# Patient Record
Sex: FEMALE | Race: WHITE | NOT HISPANIC OR LATINO | Employment: OTHER | URBAN - METROPOLITAN AREA
[De-identification: names, ages, dates, MRNs, and addresses within clinical notes are randomized per-mention and may not be internally consistent; named-entity substitution may affect disease eponyms.]

---

## 2017-01-03 PROBLEM — N81.6 RECTOCELE: Status: ACTIVE | Noted: 2017-01-03

## 2017-02-14 ENCOUNTER — TELEPHONE (OUTPATIENT)
Dept: VASCULAR LAB | Facility: MEDICAL CENTER | Age: 62
End: 2017-02-14

## 2017-06-24 NOTE — H&P
DATE OF SCHEDULED SURGERY:  07/18/2017    REASON FOR SURGERY:  Recurrent symptomatic pelvic floor relaxation, type 2   stress urinary incontinence demonstrated.  Endemic status post mixed urinary   incontinence symptoms and pelvic pain.    HISTORY OF PRESENT ILLNESS:  This is a 62-year-old postmenopausal woman, who   has undergone a previous laparoscopic-assisted vaginal hysterectomy, bilateral   salpingo-oophorectomy, anterior and posterior vaginal repair, enterocele   repair, sacrospinous vault suspension with perineoplasty, transobturator tape   midurethral sling procedure, cystoscopy, now with recurrent pelvic floor   relaxation.  Postoperatively, the patient does state that she has significant   constipation and noticed a popping sensation postoperatively with a subsequent   recurrent bulge at the vaginal introitus.  She has attempted to proceed   forward with conservative management with pelvic floor exercises.  She has   exhausted all conservative measures, declines pessary therapy or any other   conservative management and is now interested in proceeding forward with an   attempt at addressing her recurrent pelvic floor relaxation with an anterior   and posterior vaginal repair, enterocele repair, perineoplasty, sacrospinous   vault suspension, transobturator tape midurethral sling procedure.  Risks,   benefits, alternatives of all individual portions of the procedure were   addressed with the patient in detail over several visits.  She has asked   appropriate questions, signed the appropriate consents and wishes to proceed   forward with surgery as planned.    PAST MEDICAL HISTORY:  Arthritis, deep vein thrombosis, lumbago, mixed urinary   incontinence, bulge at the vaginal introitus, pelvic pain.    PAST SURGICAL HISTORY:  Cholecystectomy at age 20, hemorrhoidectomy surgery as   described above.    OBSTETRICAL HISTORY:  The patient has had 1 previous delivery, 9 pounds 11   ounces in 1973.    GYNECOLOGIC  HISTORY:  The patient has been amenorrheic for many years even   prior to her last surgery.  She does report a recurrent bulge at the vaginal   introitus as described above associated with significant pelvic pain.  She   also has mixed urinary incontinence symptoms.  She does understand her pelvic   pain and urinary incontinence, may be unimproved gradually worsened with the   surgery as described above who is interested in proceeding forward with   surgery nonetheless.  We had discussed this prior to her last surgery and   again during this preoperative counseling.    SOCIAL HISTORY:  She is .  She denies use of any alcohol, tobacco, or   recreational drug use.    MEDICATIONS:  Abilify 2 mg p.o. daily, alprazolam 1 mg p.o. daily, duloxetine   20 mg 3 times daily, famotidine 20 mg p.o. q. 6 hours p.r.n. pain, tramadol 50   mg p.o. q. 6 hours p.r.n., Xarelto 20 mg p.o. daily.    ALLERGIES:  No known drug allergies.    PHYSICAL EXAMINATION:  VITAL SIGNS:  She is afebrile, hemodynamically stable.  HEART:  Regular rate and rhythm.  CHEST:  Clear to auscultation bilaterally.  ABDOMEN:  Morbidly obese, nontender.  PELVIC:  Shows grade II-III anterior vaginal relaxation, grade II to III.    apical and grade II posterior vaginal relaxation.  Rectovaginal exam confirmed   the above.  She is guaiac negative.  There is evidence of an enterocele on   rectovaginal examination.  EXTREMITIES:  Urodynamic studies did confirm type 2 stress urinary   incontinence with mixed urinary incontinence symptoms.    ASSESSMENT AND PLAN:  A 62-year-old postmenopausal woman with recurrent pelvic   floor relaxation.  Of note, the patient had been originally referred from Dr. Mckeon for evaluation and management of her initial pelvic floor relaxation   and she had undergone conservative management prior to that surgery.  She has   attempted again conservative management with pelvic floor exercises and is now   interested in proceeding  forward with surgery as described above as she has   failed her outpatient conservative management and is now interested in   proceeding forward with any other conservative measures.  Risks, benefits and   alternatives have been addressed.  She has asked appropriate questions, signed   the appropriate consents, and wished to proceed forward with surgery as   planned.       ____________________________________     MD ROSA SLATER / LATHA    DD:  06/23/2017 08:21:23  DT:  06/23/2017 10:24:29    D#:  [id number]  Job#:  [id number]

## 2017-07-10 DIAGNOSIS — Z01.810 PRE-OPERATIVE CARDIOVASCULAR EXAMINATION: ICD-10-CM

## 2017-07-10 DIAGNOSIS — Z01.812 PRE-OPERATIVE LABORATORY EXAMINATION: ICD-10-CM

## 2017-07-10 LAB
ANION GAP SERPL CALC-SCNC: 8 MMOL/L (ref 0–11.9)
BUN SERPL-MCNC: 17 MG/DL (ref 8–22)
CALCIUM SERPL-MCNC: 8.9 MG/DL (ref 8.5–10.5)
CHLORIDE SERPL-SCNC: 106 MMOL/L (ref 96–112)
CO2 SERPL-SCNC: 27 MMOL/L (ref 20–33)
CREAT SERPL-MCNC: 0.76 MG/DL (ref 0.5–1.4)
EKG IMPRESSION: NORMAL
ERYTHROCYTE [DISTWIDTH] IN BLOOD BY AUTOMATED COUNT: 56.6 FL (ref 35.9–50)
GFR SERPL CREATININE-BSD FRML MDRD: >60 ML/MIN/1.73 M 2
GLUCOSE SERPL-MCNC: 124 MG/DL (ref 65–99)
HCT VFR BLD AUTO: 42.1 % (ref 37–47)
HGB BLD-MCNC: 13.3 G/DL (ref 12–16)
MCH RBC QN AUTO: 29.8 PG (ref 27–33)
MCHC RBC AUTO-ENTMCNC: 31.6 G/DL (ref 33.6–35)
MCV RBC AUTO: 94.4 FL (ref 81.4–97.8)
PLATELET # BLD AUTO: 238 K/UL (ref 164–446)
PMV BLD AUTO: 10.6 FL (ref 9–12.9)
POTASSIUM SERPL-SCNC: 3.9 MMOL/L (ref 3.6–5.5)
RBC # BLD AUTO: 4.46 M/UL (ref 4.2–5.4)
SODIUM SERPL-SCNC: 141 MMOL/L (ref 135–145)
WBC # BLD AUTO: 7.4 K/UL (ref 4.8–10.8)

## 2017-07-10 PROCEDURE — 85027 COMPLETE CBC AUTOMATED: CPT

## 2017-07-10 PROCEDURE — 93010 ELECTROCARDIOGRAM REPORT: CPT | Performed by: INTERNAL MEDICINE

## 2017-07-10 PROCEDURE — 36415 COLL VENOUS BLD VENIPUNCTURE: CPT

## 2017-07-10 PROCEDURE — 93005 ELECTROCARDIOGRAM TRACING: CPT

## 2017-07-10 PROCEDURE — 80048 BASIC METABOLIC PNL TOTAL CA: CPT

## 2017-07-10 RX ORDER — ARIPIPRAZOLE 2 MG/1
2 TABLET ORAL DAILY
COMMUNITY

## 2017-07-18 ENCOUNTER — HOSPITAL ENCOUNTER (OUTPATIENT)
Facility: MEDICAL CENTER | Age: 62
End: 2017-07-18
Attending: SPECIALIST | Admitting: SPECIALIST
Payer: MEDICAID

## 2017-07-18 VITALS
SYSTOLIC BLOOD PRESSURE: 148 MMHG | DIASTOLIC BLOOD PRESSURE: 86 MMHG | HEIGHT: 62 IN | HEART RATE: 76 BPM | BODY MASS INDEX: 53.92 KG/M2 | RESPIRATION RATE: 16 BRPM | TEMPERATURE: 97.7 F | OXYGEN SATURATION: 98 % | WEIGHT: 293 LBS

## 2017-07-18 PROBLEM — N39.3 FEMALE STRESS INCONTINENCE: Status: ACTIVE | Noted: 2017-07-18

## 2017-07-18 PROBLEM — N81.11 CYSTOCELE, MIDLINE: Status: ACTIVE | Noted: 2017-07-18

## 2017-07-18 PROBLEM — R10.2 ADNEXAL TENDERNESS, RIGHT: Status: ACTIVE | Noted: 2017-07-18

## 2017-07-18 PROCEDURE — A4338 INDWELLING CATHETER LATEX: HCPCS | Performed by: SPECIALIST

## 2017-07-18 PROCEDURE — 500892 HCHG PACK, PERI-GYN: Performed by: SPECIALIST

## 2017-07-18 PROCEDURE — 501838 HCHG SUTURE GENERAL: Performed by: SPECIALIST

## 2017-07-18 PROCEDURE — 502240 HCHG MISC OR SUPPLY RC 0272: Performed by: SPECIALIST

## 2017-07-18 PROCEDURE — A9270 NON-COVERED ITEM OR SERVICE: HCPCS

## 2017-07-18 PROCEDURE — 160035 HCHG PACU - 1ST 60 MINS PHASE I: Performed by: SPECIALIST

## 2017-07-18 PROCEDURE — 700111 HCHG RX REV CODE 636 W/ 250 OVERRIDE (IP)

## 2017-07-18 PROCEDURE — 160041 HCHG SURGERY MINUTES - EA ADDL 1 MIN LEVEL 4: Performed by: SPECIALIST

## 2017-07-18 PROCEDURE — 160048 HCHG OR STATISTICAL LEVEL 1-5: Performed by: SPECIALIST

## 2017-07-18 PROCEDURE — C1771 REP DEV, URINARY, W/SLING: HCPCS | Performed by: SPECIALIST

## 2017-07-18 PROCEDURE — 160029 HCHG SURGERY MINUTES - 1ST 30 MINS LEVEL 4: Performed by: SPECIALIST

## 2017-07-18 PROCEDURE — 700101 HCHG RX REV CODE 250

## 2017-07-18 PROCEDURE — 160009 HCHG ANES TIME/MIN: Performed by: SPECIALIST

## 2017-07-18 PROCEDURE — 501516 HCHG SUTURE, CAPIO: Performed by: SPECIALIST

## 2017-07-18 PROCEDURE — 700102 HCHG RX REV CODE 250 W/ 637 OVERRIDE(OP)

## 2017-07-18 PROCEDURE — 160002 HCHG RECOVERY MINUTES (STAT): Performed by: SPECIALIST

## 2017-07-18 PROCEDURE — 160036 HCHG PACU - EA ADDL 30 MINS PHASE I: Performed by: SPECIALIST

## 2017-07-18 PROCEDURE — 501330 HCHG SET, CYSTO IRRIG TUBING: Performed by: SPECIALIST

## 2017-07-18 DEVICE — SLING TOT OBTRYX I HALO: Type: IMPLANTABLE DEVICE | Site: VAGINA | Status: FUNCTIONAL

## 2017-07-18 RX ORDER — CELECOXIB 200 MG/1
CAPSULE ORAL
Status: COMPLETED
Start: 2017-07-18 | End: 2017-07-18

## 2017-07-18 RX ORDER — LIDOCAINE HYDROCHLORIDE 10 MG/ML
0.5 INJECTION, SOLUTION INFILTRATION; PERINEURAL
Status: DISCONTINUED | OUTPATIENT
Start: 2017-07-18 | End: 2017-07-18 | Stop reason: HOSPADM

## 2017-07-18 RX ORDER — ONDANSETRON 2 MG/ML
4 INJECTION INTRAMUSCULAR; INTRAVENOUS EVERY 6 HOURS PRN
Status: DISCONTINUED | OUTPATIENT
Start: 2017-07-18 | End: 2017-07-18 | Stop reason: HOSPADM

## 2017-07-18 RX ORDER — SIMETHICONE 80 MG
80 TABLET,CHEWABLE ORAL EVERY 8 HOURS PRN
Status: DISCONTINUED | OUTPATIENT
Start: 2017-07-18 | End: 2017-07-18 | Stop reason: HOSPADM

## 2017-07-18 RX ORDER — GABAPENTIN 300 MG/1
CAPSULE ORAL
Status: COMPLETED
Start: 2017-07-18 | End: 2017-07-18

## 2017-07-18 RX ORDER — OXYCODONE HYDROCHLORIDE 5 MG/1
10 TABLET ORAL
Status: DISCONTINUED | OUTPATIENT
Start: 2017-07-18 | End: 2017-07-18 | Stop reason: HOSPADM

## 2017-07-18 RX ORDER — ACETAMINOPHEN 500 MG
1000 TABLET ORAL EVERY 6 HOURS
Status: DISCONTINUED | OUTPATIENT
Start: 2017-07-18 | End: 2017-07-18 | Stop reason: HOSPADM

## 2017-07-18 RX ORDER — SODIUM CHLORIDE, SODIUM LACTATE, POTASSIUM CHLORIDE, CALCIUM CHLORIDE 600; 310; 30; 20 MG/100ML; MG/100ML; MG/100ML; MG/100ML
INJECTION, SOLUTION INTRAVENOUS CONTINUOUS
Status: DISCONTINUED | OUTPATIENT
Start: 2017-07-18 | End: 2017-07-18 | Stop reason: HOSPADM

## 2017-07-18 RX ORDER — METOCLOPRAMIDE HYDROCHLORIDE 5 MG/ML
10 INJECTION INTRAMUSCULAR; INTRAVENOUS EVERY 4 HOURS PRN
Status: DISCONTINUED | OUTPATIENT
Start: 2017-07-18 | End: 2017-07-18 | Stop reason: HOSPADM

## 2017-07-18 RX ORDER — BUPIVACAINE HYDROCHLORIDE AND EPINEPHRINE 2.5; 5 MG/ML; UG/ML
INJECTION, SOLUTION EPIDURAL; INFILTRATION; INTRACAUDAL; PERINEURAL
Status: DISCONTINUED
Start: 2017-07-18 | End: 2017-07-18 | Stop reason: HOSPADM

## 2017-07-18 RX ORDER — ACETAMINOPHEN 500 MG
TABLET ORAL
Status: COMPLETED
Start: 2017-07-18 | End: 2017-07-18

## 2017-07-18 RX ORDER — ONDANSETRON 2 MG/ML
INJECTION INTRAMUSCULAR; INTRAVENOUS
Status: COMPLETED
Start: 2017-07-18 | End: 2017-07-18

## 2017-07-18 RX ORDER — MIDAZOLAM HYDROCHLORIDE 1 MG/ML
INJECTION INTRAMUSCULAR; INTRAVENOUS
Status: DISCONTINUED
Start: 2017-07-18 | End: 2017-07-18 | Stop reason: HOSPADM

## 2017-07-18 RX ORDER — BUPIVACAINE HYDROCHLORIDE AND EPINEPHRINE 2.5; 5 MG/ML; UG/ML
INJECTION, SOLUTION INFILTRATION; PERINEURAL
Status: DISCONTINUED | OUTPATIENT
Start: 2017-07-18 | End: 2017-07-18 | Stop reason: HOSPADM

## 2017-07-18 RX ORDER — HALOPERIDOL 5 MG/ML
INJECTION INTRAMUSCULAR
Status: COMPLETED
Start: 2017-07-18 | End: 2017-07-18

## 2017-07-18 RX ORDER — LIDOCAINE AND PRILOCAINE 25; 25 MG/G; MG/G
1 CREAM TOPICAL
Status: DISCONTINUED | OUTPATIENT
Start: 2017-07-18 | End: 2017-07-18 | Stop reason: HOSPADM

## 2017-07-18 RX ORDER — OXYCODONE HCL 5 MG/5 ML
SOLUTION, ORAL ORAL
Status: COMPLETED
Start: 2017-07-18 | End: 2017-07-18

## 2017-07-18 RX ADMIN — ACETAMINOPHEN 1000 MG: 500 TABLET, FILM COATED ORAL at 15:45

## 2017-07-18 RX ADMIN — ONDANSETRON 4 MG: 2 INJECTION INTRAMUSCULAR; INTRAVENOUS at 17:57

## 2017-07-18 RX ADMIN — CELECOXIB 400 MG: 200 CAPSULE ORAL at 15:45

## 2017-07-18 RX ADMIN — SODIUM CHLORIDE, SODIUM LACTATE, POTASSIUM CHLORIDE, CALCIUM CHLORIDE: 600; 310; 30; 20 INJECTION, SOLUTION INTRAVENOUS at 13:30

## 2017-07-18 RX ADMIN — FENTANYL CITRATE 50 MCG: 50 INJECTION, SOLUTION INTRAMUSCULAR; INTRAVENOUS at 17:30

## 2017-07-18 RX ADMIN — HYDROMORPHONE HYDROCHLORIDE 0.2 MG: 1 INJECTION, SOLUTION INTRAMUSCULAR; INTRAVENOUS; SUBCUTANEOUS at 17:40

## 2017-07-18 RX ADMIN — HALOPERIDOL LACTATE 1 MG: 5 INJECTION, SOLUTION INTRAMUSCULAR at 18:09

## 2017-07-18 RX ADMIN — GABAPENTIN 600 MG: 300 CAPSULE ORAL at 15:45

## 2017-07-18 RX ADMIN — FENTANYL CITRATE 50 MCG: 50 INJECTION, SOLUTION INTRAMUSCULAR; INTRAVENOUS at 17:25

## 2017-07-18 RX ADMIN — HYDROMORPHONE HYDROCHLORIDE 0.2 MG: 1 INJECTION, SOLUTION INTRAMUSCULAR; INTRAVENOUS; SUBCUTANEOUS at 17:35

## 2017-07-18 RX ADMIN — OXYCODONE HYDROCHLORIDE 10 MG: 5 SOLUTION ORAL at 17:26

## 2017-07-18 ASSESSMENT — PAIN SCALES - GENERAL
PAINLEVEL_OUTOF10: 9
PAINLEVEL_OUTOF10: 3
PAINLEVEL_OUTOF10: 7
PAINLEVEL_OUTOF10: 5
PAINLEVEL_OUTOF10: 3
PAINLEVEL_OUTOF10: 0
PAINLEVEL_OUTOF10: 5

## 2017-07-18 NOTE — IP AVS SNAPSHOT
7/18/2017    Nieves Murphy  [address] [place] [address] [address]   [place] [address]    Dear Nieves:    [facility] wants to ensure your discharge home is safe and you or your loved ones have had all of your questions answered regarding your care after you leave the hospital.    Below is a list of resources and contact information should you have any questions regarding your hospital stay, follow-up instructions, or active medical symptoms.    Questions or Concerns Regarding… Contact   Medical Questions Related to Your Discharge  (7 days a week, 8am-5pm) Contact a Nurse Care Coordinator   [phone]   Medical Questions Not Related to Your Discharge  (24 hours a day / 7 days a week)  Contact the Nurse Health Line   [phone]    Medications or Discharge Instructions Refer to your discharge packet   or contact your [facility] Primary Care Provider   [phone]   Follow-up Appointment(s) Schedule your appointment via [employer]   or contact Scheduling [phone]   Billing Review your statement via [employer]  or contact Billing [phone]   Medical Records Review your records via [employer]   or contact Medical Records [phone]     You may receive a telephone call within two days of discharge. This call is to make certain you understand your discharge instructions and have the opportunity to have any questions answered. You can also easily access your medical information, test results and upcoming appointments via the [employer] free online health management tool. You can learn more and sign up at [employer]. For assistance setting up your [employer] account, please call [phone].    Once again, we want to ensure your discharge home is safe and that you have a clear understanding of any next steps in your care. If you have any questions or concerns, please do not hesitate to contact us, we are here for you. Thank you for choosing [facility] for your healthcare needs.    Sincerely,    Your [facility] Healthcare Team

## 2017-07-18 NOTE — IP AVS SNAPSHOT
" Home Care Instructions                                                                                                                Name:Nieves Murphy  Medical Record Number:[id number]  CSN: [id number]    YOB: 1955   Age: 62 y.o.  Sex: female  HT:1.575 m (5' 2.01\") WT: 149.2 kg (328 lb 14.8 oz)          Admit Date: 7/18/2017     Discharge Date:   Today's Date: 7/18/2017  Attending Doctor:  Dave Ellis M.D.                  Allergies:  Asa and Pcn                Discharge Instructions         ACTIVITY: Rest and take it easy for the first 24 hours.  A responsible adult is recommended to remain with you during that time.  It is normal to feel sleepy.  We encourage you to not do anything that requires balance, judgment or coordination.    MILD FLU-LIKE SYMPTOMS ARE NORMAL. YOU MAY EXPERIENCE GENERALIZED MUSCLE ACHES, THROAT IRRITATION, HEADACHE AND/OR SOME NAUSEA.    FOR 24 HOURS DO NOT:  Drive, operate machinery or run household appliances.  Drink beer or alcoholic beverages.   Make important decisions or sign legal documents.    SPECIAL INSTRUCTIONS: *Anterior and Posterior Colporrhaphy, Sling Procedure, Care After  Refer to this sheet in the next few weeks. These instructions provide you with information on caring for yourself after your procedure. Your health care provider may also give you more specific instructions. Your treatment has been planned according to current medical practices, but problems sometimes occur. Call your health care provider if you have any problems or questions after your procedure.   HOME CARE INSTRUCTIONS  · Rest as much as possible during the first 2 weeks after the procedure.    · Avoid heavy lifting (more than 10 pounds [4.5 kg]), pushing, or pulling. Limit stair climbing to once or twice a day the first week, then slowly increase this activity.    · Avoid standing for prolonged periods of time.    · Talk with your health care provider about when you may resume " your usual physical activity.    · You may resume your normal diet right away.    · Drink at least 6-8 glasses of non-caffeinated beverages per day.    · Eat a well-balanced diet. Daily portions of food from the meat (protein), milk, fruit, vegetable, and bread families are necessary for your health.    · Your normal bowel function should return. If you become constipated, you may:    ¨ Take a mild laxative.  ¨ Add fruit and bran to your diet.  ¨ Drink more liquids.  · You may take a shower and wash your hair.    · Only take over-the-counter or prescription medicines as directed by your health care provider.    · Clean the incision with water. Do not use a dressing unless the incision is draining or irritated. Check your incision daily for redness, draining, swelling, or separation of the skin.    · Follow any bladder care instructions provided by your health care provider.    · Keep your perineal area (the area between vagina and rectum) clean and dry. Perform perineal care after every bowel movement and each time you urinate. You may take a sitz bath or sit in a tub of clean, warm water when necessary, unless your health care provider tells you otherwise.    · Do not have sexual intercourse until permitted by your health care provider.    · Follow up with your health care provider as directed.    SEEK MEDICAL CARE IF:  · You have shaking chills.    · Your pain is not relieved with medicine or becomes worse.   · You have frequent or urgent urination, or you are unable to completely empty your bladder.    · You feel a burning sensation when urinating.    · You see pus coming from the wounds.    SEEK IMMEDIATE MEDICAL CARE IF:  · You develop a fever.  · You notice redness, drainage, swelling, or separation of the skin at the incision site.  · You have difficulty breathing.  · You are unable to urinate.  MAKE SURE YOU:   · Understand these instructions.  · Will watch your condition.  · Will get help right away if you  are not doing well or get worse.     This information is not intended to replace advice given to you by your health care provider. Make sure you discuss any questions you have with your health care provider.     Document Released: 08/01/2005 Document Revised: 08/20/2014 Document Reviewed: 05/09/2014  [employer] Interactive Patient Education ©2016 [employer]  **.    If your physician has prescribed pain medication that includes Acetaminophen (Tylenol), do not take additional Acetaminophen (Tylenol) while taking the prescribed medication.    **    DIET: To avoid nausea, slowly advance diet as tolerated, avoiding spicy or greasy foods for the first day.  Add more substantial food to your diet according to your physician's instructions.  Babies can be fed formula or breast milk as soon as they are hungry.  INCREASE FLUIDS AND FIBER TO AVOID CONSTIPATION.    SURGICAL DRESSING/BATHING: *MAY SHOWER TOMORROW.  NO TUB BATHS, HOT TUBS OR SWIMMING UNTIL APPROVED BY PHYSICIAN**    FOLLOW-UP APPOINTMENT:  A follow-up appointment should be arranged with your doctor in *FOLLOW UP WITH DR. SHORT**; call to schedule.    You should CALL YOUR PHYSICIAN if you develop:  Fever greater than 101 degrees F.  Pain not relieved by medication, or persistent nausea or vomiting.  Excessive bleeding (blood soaking through dressing) or unexpected drainage from the wound.  Extreme redness or swelling around the incision site, drainage of pus or foul smelling drainage.  Inability to urinate or empty your bladder within 8 hours.  Problems with breathing or chest pain.    You should call 911 if you develop problems with breathing or chest pain.  If you are unable to contact your doctor or surgical center, you should go to the nearest emergency room or urgent care center.  Physician's telephone #: *DR. SHORT [phone]**    If any questions arise, call your doctor.  If your doctor is not available, please feel free to call the Surgical Center at  [phone].  The Center is open Monday through Friday from 7AM to 7PM.  You can also call the HEALTH HOTLINE open 24 hours/day, 7 days/week and speak to a nurse at [phone], or toll free at [phone].    A registered nurse may call you a few days after your surgery to see how you are doing after your procedure.    MEDICATIONS: Resume taking daily medication.  Take prescribed pain medication with food.  If no medication is prescribed, you may take non-aspirin pain medication if needed.  PAIN MEDICATION CAN BE VERY CONSTIPATING.  Take a stool softener or laxative such as senokot, pericolace, or milk of magnesia if needed.    Prescription given for **PATIENT HAS AT HOME*.  Last pain medication given at *____5:26 PM_____________________    Depression / Suicide Risk    As you are discharged from this [facility] facility, it is important to learn how to keep safe from harming yourself.    Recognize the warning signs:  · Abrupt changes in personality, positive or negative- including increase in energy   · Giving away possessions  · Change in eating patterns- significant weight changes-  positive or negative  · Change in sleeping patterns- unable to sleep or sleeping all the time   · Unwillingness or inability to communicate  · Depression  · Unusual sadness, discouragement and loneliness  · Talk of wanting to die  · Neglect of personal appearance   · Rebelliousness- reckless behavior  · Withdrawal from people/activities they love  · Confusion- inability to concentrate     If you or a loved one observes any of these behaviors or has concerns about self-harm, here's what you can do:  · Talk about it- your feelings and reasons for harming yourself  · Remove any means that you might use to hurt yourself (examples: pills, rope, extension cords, firearm)  · Get professional help from the community (Mental Health, Substance Abuse, psychological counseling)  · Do not be alone:Call your Safe Contact- someone whom  you trust who will be there for you.  · Call your local CRISIS HOTLINE 051-0498 or 920-017-4774  · Call your local Children's Mobile Crisis Response Team [place] (824) 271-9335 or www.[employer]  · Call the toll free National Suicide Prevention Hotlines   · National Suicide Prevention Lifeline 300-709-LLID (7766)  [facility] Line Network 800-SUICIDE (614-2069)Discharge Education for patients on RHONDA (Obstructive Sleep Apnea) Protocol    Prior to receiving sedation or anesthesia, we screen all patients for Obstructive Sleep Apnea.  During your screening, you were identified as having suspected, but not confirmed Obstructive Sleep Apnea(RHONDA).    What is Obstructive Sleep Apnea?  Sleep apnea (AP-ne-ah) is a common disorder which involves breathing pauses that occur during sleep.  These can last from 10 seconds to a minute or longer.  Normal breathing resumes often with a loud snort or choking sound.    Sleep apnea occurs in all age groups and both genders but is more common in men and people over 40 years of age.  It has been estimated that as many as 18 million Americans have sleep apnea.  Most people who have sleep apnea don’t know they have it because it only occurs during sleep.  A family member and/or bed partner may first notice the signs of sleep apnea.  Sleep apnea is a chronic (ongoing) condition that disrupts the quality and quantity of your sleep repeatedly throughout the night.  This often results in excessive daytime sleepiness or fatigue during the day.  It may also contribute to high blood pressure, heart problems, and complications following medications used for surgery and procedures.    To establish a definitive diagnosis, further testing from a specialist would be needed.  We recommend that you follow up with your primary care physician.    We recommend that you should be with an adult observer for at least 24 hours after your sedation/anesthesia.  If you have a CPAP machine, you should  wear it during any sleep period (day or night) for the week following your procedure.  We encourage you to sleep on your side or in a sitting position, even with napping.  Lying flat on your back increases the risk of apnea and airway obstruction during your post procedure recovery period.    It is important to prevent over-sedation that could increase your risk for apnea.  Please take all pain medication as directed by your physician.  If you are not getting pain relief, please contact your physician to discuss possible approaches to relieving pain while minimizing medications that can affect your breathing and oxygen levels.      ·        Medication List      CONTINUE taking these medications        Instructions    Morning Afternoon Evening Bedtime    alprazolam 0.25 MG Tabs   Commonly known as:  XANAX        Take 0.25 mg by mouth 3 times a day as needed for Sleep or Anxiety.   Dose:  0.25 mg                        aripiprazole 2 MG tablet   Commonly known as:  ABILIFY        Take 2 mg by mouth every day.   Dose:  2 mg                        duloxetine 20 MG Cpep   Commonly known as:  CYMBALTA        Take 20 mg by mouth 2 times a day.   Dose:  20 mg                        gabapentin 100 MG Caps   Last time this was given:  600 mg on 7/18/2017  3:45 PM   Commonly known as:  NEURONTIN        Take 200 mg by mouth every bedtime.   Dose:  200 mg                        promethazine 25 MG Tabs   Commonly known as:  PHENERGAN        Take 25 mg by mouth every 6 hours as needed for Nausea/Vomiting.   Dose:  25 mg                        rivaroxaban 20 MG Tabs tablet   Commonly known as:  XARELTO        Take 1 Tab by mouth with dinner.   Dose:  20 mg                        tramadol 50 MG Tabs   Commonly known as:  ULTRAM        Take  mg by mouth every four hours as needed for Mild Pain.   Dose:   mg                                Medication Information     Above and/or attached are the medications your physician  expects you to take upon discharge. Review all of your home medications and newly ordered medications with your doctor and/or pharmacist. Follow medication instructions as directed by your doctor and/or pharmacist. Please keep your medication list with you and share with your physician. Update the information when medications are discontinued, doses are changed, or new medications (including over-the-counter products) are added; and carry medication information at all times in the event of emergency situations.        Resources     Quit Smoking / Tobacco Use:    I understand the use of any tobacco products increases my chance of suffering from future heart disease or stroke and could cause other illnesses which may shorten my life. Quitting the use of tobacco products is the single most important thing I can do to improve my health. For further information on smoking / tobacco cessation call a Toll Free Quit Line at 1-354.288.1062 (*[employer] [place]) or 1-722.527.1710 (American Lung Association) or you can access the web based program at www.lungVisualnet.org.    [place] Tobacco Users Help Line:  (414) 330-7456       Toll Free: 1-900.155.3393  Quit Tobacco Program [facility] (318)158-0701    Crisis Hotline:    [place] Crisis Hotline:  2-380-GWMLFWY or 1-852.430.9977    [place] Crisis Hotline:    1-488.375.9074 or 615-327-2561    Discharge Survey:   Thank you for choosing [facility]. We hope we did everything we could to make your hospital stay a pleasant one. You may be receiving a survey and we would appreciate your time and participation in answering the questions. Your input is very valuable to us in our efforts to improve our service to our patients and their families.            Signatures     My signature on this form indicates that:    1. I acknowledge receipt and understanding of these Home Care Instruction.  2. My questions regarding this information have been answered to my  satisfaction.  3. I have formulated a plan with my discharge nurse to obtain my prescribed medications for home.    __________________________________      __________________________________                   Patient Signature                                 Guardian/Responsible Adult Signature      __________________________________                 __________       ________                       Nurse Signature                                               Date                 Time

## 2017-07-19 NOTE — OP REPORT
DATE OF SERVICE:  7/18/2017     PREOPERATIVE DIAGNOSES:  Recurrent symptomatic pelvic floor relaxation, type 2    stress urinary incontinence demonstrated     POSTOPERATIVE DIAGNOSES: same     PROCEDURES PERFORMED:  Anterior and posterior vaginal repair, enterocele    repair, sacrospinous vault suspension, transobturator tape midurethral sling    procedure, cystoscopy.     SURGEON:  Dave Ellis MD     ASSISTANT:  Aldo Taylor MD     ANESTHESIA:  General     ANESTHESIOLOGIST:  Anesthesiologist: Brock Briggs M.D.     ESTIMATED BLOOD LOSS FOR THE PROCEDURE:  less than 20 mL.     FINDINGS:  Good support of the anterior and posterior vaginal walls in    addition to the apical vaginal tissue without any undue tension in the suture    sites.  Cystoscopy revealed bilateral ureteral efflux and normal bladder and    no undue tension noted at the level of the mid urethra after sling placement    on cystoscopic evaluation.     DESCRIPTION OF PROCEDURE:  The patient was taken to the operating room where    general anesthesia was performed without difficulty.  Patient was then prepped   and draped in usual sterile fashion.  Lower extremities placed in Bipin    stirrups.  Attention was first turned to the perineum where a weighted    speculum was placed with the use of Sales retractor.  Anterior vaginal mucosa    was then injected with 10 mL of 0.25% Marcaine with epinephrine.  The vaginal    mucosa was then dissected off the underlying pubocervical fascia leroy until    the levator muscles were then reached.  Her previous transobturator tape sling   was resected followed by placement of horizontal mattress sutures    reapproximating the pubocervical fascia in midline in a double layer closure,    thereby reducing the midline cystocele followed by injection of 10 mL of 0.25%   Marcaine with epinephrine lateral to the clitoris in the labial crural folds    followed by stab incision made with the use of 11 blade scalpel in this     location on each side followed by placement of the respective Obtryx halo    needle through the labial crural incision sites to the obturator membranes    through the endopelvic fascia out through the vaginal mucosal incision at the    level of the mid urethra.  The sling was then applied to the Obtryx halo    needle.  Needle was then taken back up through the original tract.  Tensioning   of position was then performed.  Donis catheter was removed, which had been    placed at the beginning of the case for placement of a 30-degree cystoscope,    which revealed normal urinary bladder, bilateral ureteral efflux and no undue    tension noted at the level of the mid urethra.  The sling was then released,    tensioning of position was then finalized under direct cystoscopic evaluation.    Cystoscope removed.  Donis catheter replaced.  All excess vaginal mucosa and   sling material were then excised.  The vaginal mucosa was then reapproximated   with 2-0 Vicryl in a running interlocking fashion in one segment.  Posterior    vaginal mucosa was then injected with 10 mL of 0.25% Marcaine with    epinephrine.  The vaginal mucosa was then dissected off the underlying    rectovaginal fascia leroy until the levator muscles were then reached.     Horizontal mattress sutures were then used to reapproximate the rectovaginal    fascia in the midline in a double 0 closure, thereby reducing the midline    rectocele.  A large enterocele was located at the superior aspect of the    dissection.  Dissection of the sacrospinous process was then performed in the    ischial spine on each side, 3 cm medial along the sacrospinous ligament with straight    catheterization needle device, 0 Ethibond suture was taken through the    sacrospinous ligament taken out through their respective apical portions of the    vaginal cuff and the overlying vaginal mucosa.  Once tied down, there was good   reapproximation of the apex of the vaginal vault to  the sacrospinous    ligament.  The rectovaginal septum was then reapproximated in the posterior    aspect of the vaginal cuff in a double pursestring suture, thereby reducing    the large enterocele located at the superior aspect of the dissection.  All    excess vaginal mucosa was then trimmed.  The vaginal mucosa was then    reapproximated with 2-0 Vicryl in running locking fashion in one segment    performing a perineoplasty on the perineum.  The patient tolerated procedure    well and was awoken from general anesthesia, was taken to the recovery in    stable condition.        ____________________________________     NYASIA SHORT MD

## 2017-07-19 NOTE — OR NURSING
1709  RECEIVED PATIENT FROM OR.  REPORT FROM DR. COX.  ET TUBE IN PLACE.  RESPIRATIONS ARE EVEN AND UNLABORED.  PIA PAD IN PLACE.  NO DRAINAGE NOTED.  [place] TO DD WITH CLEAR, YELLOW URINE.    1710  ET TUBE DC'D WITHOUT DIFFICULTY.    1725  MEDICATED WITH IV FENTANYL FOR C/O PAIN 9.    1726  MEDICATED WITH PO OXYCODONE.    1735  MEDICATED WITH IV DILAUDID FOR C/O PAIN 7.    1740  MEDICATED WITH IV DILAUDID.    1757  MEDICATED WITH IV ZOFRAN  CC EMESIS.    1806  DR. GANSERT CHECKED THE HALDOL AND BENADRYL BOXES ON THE ANESTHESIA RECORD.  PATIENT C/O NAUSEA.    1914  BLADDER BACK FILLED WITH 300 CC STERILE WATER.    1919  UP TO THE BATHROOM.  VOIDED 220 CC URINE.       2009  DISCHARGED.  DISCHARGE INSTRUCTIONS GIVEN TO PATIENT.  A VERBAL UNDERSTANDING OF ALL INSTRUCTIONS WAS STATED.  PATIENT TAKING PO, VOIDING AND AMBULATING WITH HER CANE WITHOUT DIFFICULTY. PATIENT STATES SHE IS READY TO GO HOME..

## 2017-07-19 NOTE — OR SURGEON
Operative Report    PreOp Diagnosis: Recurrent symptomatic pelvic floor relaxation, type 2    stress urinary incontinence demonstrated    PostOp Diagnosis: Same    Procedure(s):  ANTERIOR AND POSTERIOR REPAIR  - Wound Class: Clean Contaminated  ENTEROCELE REPAIR , PERINEOPLASTY  - Wound Class: Clean Contaminated  VAGINAL SUSPENSION- SACROSPINOUS VAULT  - Wound Class: Clean Contaminated  BLADDER SLING FEMALE TOT - Wound Class: Clean Contaminated    Surgeon(s):  DAISY Toledo M.D.    Anesthesiologist/Type of Anesthesia:  Anesthesiologist: Brock Briggs M.D./General    Surgical Staff:  Circulator: Gilma Amado R.N.  Relief Circulator: Yari Ayala R.N.  Scrub Person: Josefina Rivera    Specimens:  None    Estimated Blood Loss: less than 20ccs    Findings: Good support of the anterior and posterior and apical vaginal tissue without any undue tension at any sutures sites, cystoscopy revealed bilateral ureteral efflux, normal bladder and no undue tension at the mid urethra after sling placement    Complications: none        7/18/2017 5:03 PM Dave Ellis

## 2017-07-19 NOTE — DISCHARGE INSTRUCTIONS
ACTIVITY: Rest and take it easy for the first 24 hours.  A responsible adult is recommended to remain with you during that time.  It is normal to feel sleepy.  We encourage you to not do anything that requires balance, judgment or coordination.    MILD FLU-LIKE SYMPTOMS ARE NORMAL. YOU MAY EXPERIENCE GENERALIZED MUSCLE ACHES, THROAT IRRITATION, HEADACHE AND/OR SOME NAUSEA.    FOR 24 HOURS DO NOT:  Drive, operate machinery or run household appliances.  Drink beer or alcoholic beverages.   Make important decisions or sign legal documents.    SPECIAL INSTRUCTIONS: *Anterior and Posterior Colporrhaphy, Sling Procedure, Care After  Refer to this sheet in the next few weeks. These instructions provide you with information on caring for yourself after your procedure. Your health care provider may also give you more specific instructions. Your treatment has been planned according to current medical practices, but problems sometimes occur. Call your health care provider if you have any problems or questions after your procedure.   HOME CARE INSTRUCTIONS  · Rest as much as possible during the first 2 weeks after the procedure.    · Avoid heavy lifting (more than 10 pounds [4.5 kg]), pushing, or pulling. Limit stair climbing to once or twice a day the first week, then slowly increase this activity.    · Avoid standing for prolonged periods of time.    · Talk with your health care provider about when you may resume your usual physical activity.    · You may resume your normal diet right away.    · Drink at least 6-8 glasses of non-caffeinated beverages per day.    · Eat a well-balanced diet. Daily portions of food from the meat (protein), milk, fruit, vegetable, and bread families are necessary for your health.    · Your normal bowel function should return. If you become constipated, you may:    ¨ Take a mild laxative.  ¨ Add fruit and bran to your diet.  ¨ Drink more liquids.  · You may take a shower and wash your hair.     · Only take over-the-counter or prescription medicines as directed by your health care provider.    · Clean the incision with water. Do not use a dressing unless the incision is draining or irritated. Check your incision daily for redness, draining, swelling, or separation of the skin.    · Follow any bladder care instructions provided by your health care provider.    · Keep your perineal area (the area between vagina and rectum) clean and dry. Perform perineal care after every bowel movement and each time you urinate. You may take a sitz bath or sit in a tub of clean, warm water when necessary, unless your health care provider tells you otherwise.    · Do not have sexual intercourse until permitted by your health care provider.    · Follow up with your health care provider as directed.    SEEK MEDICAL CARE IF:  · You have shaking chills.    · Your pain is not relieved with medicine or becomes worse.   · You have frequent or urgent urination, or you are unable to completely empty your bladder.    · You feel a burning sensation when urinating.    · You see pus coming from the wounds.    SEEK IMMEDIATE MEDICAL CARE IF:  · You develop a fever.  · You notice redness, drainage, swelling, or separation of the skin at the incision site.  · You have difficulty breathing.  · You are unable to urinate.  MAKE SURE YOU:   · Understand these instructions.  · Will watch your condition.  · Will get help right away if you are not doing well or get worse.     This information is not intended to replace advice given to you by your health care provider. Make sure you discuss any questions you have with your health care provider.     Document Released: 08/01/2005 Document Revised: 08/20/2014 Document Reviewed: 05/09/2014  [employer] Interactive Patient Education ©2016 [employer]  **.    If your physician has prescribed pain medication that includes Acetaminophen (Tylenol), do not take additional Acetaminophen (Tylenol) while taking  the prescribed medication.    **    DIET: To avoid nausea, slowly advance diet as tolerated, avoiding spicy or greasy foods for the first day.  Add more substantial food to your diet according to your physician's instructions.  Babies can be fed formula or breast milk as soon as they are hungry.  INCREASE FLUIDS AND FIBER TO AVOID CONSTIPATION.    SURGICAL DRESSING/BATHING: *MAY SHOWER TOMORROW.  NO TUB BATHS, HOT TUBS OR SWIMMING UNTIL APPROVED BY PHYSICIAN**    FOLLOW-UP APPOINTMENT:  A follow-up appointment should be arranged with your doctor in *FOLLOW UP WITH DR. SHORT**; call to schedule.    You should CALL YOUR PHYSICIAN if you develop:  Fever greater than 101 degrees F.  Pain not relieved by medication, or persistent nausea or vomiting.  Excessive bleeding (blood soaking through dressing) or unexpected drainage from the wound.  Extreme redness or swelling around the incision site, drainage of pus or foul smelling drainage.  Inability to urinate or empty your bladder within 8 hours.  Problems with breathing or chest pain.    You should call 911 if you develop problems with breathing or chest pain.  If you are unable to contact your doctor or surgical center, you should go to the nearest emergency room or urgent care center.  Physician's telephone #: *DR. SHORT [phone]**    If any questions arise, call your doctor.  If your doctor is not available, please feel free to call the Surgical Center at [phone].  The Center is open Monday through Friday from 7AM to 7PM.  You can also call the [facility] HOTLINE open 24 hours/day, 7 days/week and speak to a nurse at [phone], or toll free at [phone].    A registered nurse may call you a few days after your surgery to see how you are doing after your procedure.    MEDICATIONS: Resume taking daily medication.  Take prescribed pain medication with food.  If no medication is prescribed, you may take non-aspirin pain medication if needed.  PAIN MEDICATION  CAN BE VERY CONSTIPATING.  Take a stool softener or laxative such as senokot, pericolace, or milk of magnesia if needed.    Prescription given for **PATIENT HAS AT HOME*.  Last pain medication given at *____5:26 PM_____________________    Depression / Suicide Risk    As you are discharged from this [facility] facility, it is important to learn how to keep safe from harming yourself.    Recognize the warning signs:  · Abrupt changes in personality, positive or negative- including increase in energy   · Giving away possessions  · Change in eating patterns- significant weight changes-  positive or negative  · Change in sleeping patterns- unable to sleep or sleeping all the time   · Unwillingness or inability to communicate  · Depression  · Unusual sadness, discouragement and loneliness  · Talk of wanting to die  · Neglect of personal appearance   · Rebelliousness- reckless behavior  · Withdrawal from people/activities they love  · Confusion- inability to concentrate     If you or a loved one observes any of these behaviors or has concerns about self-harm, here's what you can do:  · Talk about it- your feelings and reasons for harming yourself  · Remove any means that you might use to hurt yourself (examples: pills, rope, extension cords, firearm)  · Get professional help from the community (Mental Health, Substance Abuse, psychological counseling)  · Do not be alone:Call your Safe Contact- someone whom you trust who will be there for you.  · Call your local CRISIS HOTLINE 475-6322 or 923-012-2781  · Call your local Children's Mobile Crisis Response Team [place] (156) 073-9854 or www.[employer]  · Call the toll free National Suicide Prevention Hotlines   · National Suicide Prevention Lifeline 419-761-BRKD (7714)  National Hope Line Network 800-SUICIDE (437-1271)Discharge Education for patients on RHONDA (Obstructive Sleep Apnea) Protocol    Prior to receiving sedation or anesthesia, we screen all patients  for Obstructive Sleep Apnea.  During your screening, you were identified as having suspected, but not confirmed Obstructive Sleep Apnea(RHONDA).    What is Obstructive Sleep Apnea?  Sleep apnea (AP-ne-ah) is a common disorder which involves breathing pauses that occur during sleep.  These can last from 10 seconds to a minute or longer.  Normal breathing resumes often with a loud snort or choking sound.    Sleep apnea occurs in all age groups and both genders but is more common in men and people over 40 years of age.  It has been estimated that as many as 18 million Americans have sleep apnea.  Most people who have sleep apnea don’t know they have it because it only occurs during sleep.  A family member and/or bed partner may first notice the signs of sleep apnea.  Sleep apnea is a chronic (ongoing) condition that disrupts the quality and quantity of your sleep repeatedly throughout the night.  This often results in excessive daytime sleepiness or fatigue during the day.  It may also contribute to high blood pressure, heart problems, and complications following medications used for surgery and procedures.    To establish a definitive diagnosis, further testing from a specialist would be needed.  We recommend that you follow up with your primary care physician.    We recommend that you should be with an adult observer for at least 24 hours after your sedation/anesthesia.  If you have a CPAP machine, you should wear it during any sleep period (day or night) for the week following your procedure.  We encourage you to sleep on your side or in a sitting position, even with napping.  Lying flat on your back increases the risk of apnea and airway obstruction during your post procedure recovery period.    It is important to prevent over-sedation that could increase your risk for apnea.  Please take all pain medication as directed by your physician.  If you are not getting pain relief, please contact your physician to discuss  possible approaches to relieving pain while minimizing medications that can affect your breathing and oxygen levels.      ·

## 2017-08-14 ENCOUNTER — ANTICOAGULATION MONITORING (OUTPATIENT)
Dept: MEDICAL GROUP | Facility: PHYSICIAN GROUP | Age: 62
End: 2017-08-14

## 2017-08-14 DIAGNOSIS — Z79.01 CHRONIC ANTICOAGULATION: ICD-10-CM

## 2017-08-14 DIAGNOSIS — Z79.01 ANTICOAGULATED: ICD-10-CM

## 2017-08-14 NOTE — PROGRESS NOTES
S/w patient regarding anticoagulation therapy.  No AE's or bruising/bleeding issues to report.  Labs are current, no change in dosing warranted.    Health Status Since Last Assessment   Patient denies any new relevant medical problems, ED visits or hospitalizations   Patient denies any embolic events (stroke/tia/systemic embolism)    Adherence with DOAC Therapy   Pt has NO missed any doses in the average week    Bleeding Risk Assessment     Negative Epistaxis   Pt denies any excessive or unusual bleeding/hematomas.  Pt denies any GI bleeds or hematemesis.  Pt denies any concerning daily headache or sub dural hematoma symptoms.     Pt denies any hematuria or abnormal vaginal bleeding.   Latest Hemoglobin 13.3   ETOH overuse Negative     Creatinine Clearance/Renal Function     Latest ClCr >50 mL/min     Drug Interactions   ASA/other antiplatelets Negative (allergy to ASA)   NSAID Negative   Other drug interactions Negative    Final Assessment and Recommendations:   Patient appears stable from the anticoagulation staindpoint.     Benefits of continued DOAC therapy outweigh risks for this patient   Recommend pt continue with current DOAC therapy Xarelto 20mg one time daily (with dose adjustment)     Other Actions:    Follow up:   Will follow up with patient via telephone in 6 months.      Juve Ramsey, PHARMD

## 2017-10-11 ENCOUNTER — ANTICOAGULATION MONITORING (OUTPATIENT)
Dept: VASCULAR LAB | Facility: MEDICAL CENTER | Age: 62
End: 2017-10-11

## 2017-10-11 DIAGNOSIS — Z79.01 CHRONIC ANTICOAGULATION: ICD-10-CM

## 2017-10-11 DIAGNOSIS — Z86.718 HISTORY OF DVT (DEEP VEIN THROMBOSIS): ICD-10-CM

## 2018-04-02 DIAGNOSIS — Z79.01 CHRONIC ANTICOAGULATION: ICD-10-CM

## 2018-04-04 ENCOUNTER — TELEPHONE (OUTPATIENT)
Dept: VASCULAR LAB | Facility: MEDICAL CENTER | Age: 63
End: 2018-04-04

## 2018-04-04 DIAGNOSIS — I82.409 DEEP VEIN THROMBOSIS (DVT) OF LOWER EXTREMITY, UNSPECIFIED CHRONICITY, UNSPECIFIED LATERALITY, UNSPECIFIED VEIN (HCC): ICD-10-CM

## 2018-04-04 NOTE — TELEPHONE ENCOUNTER
Patient due for labs for xarelto therapy.  LM with patient to determine preferred lab.  Order placed in EPIC for CMP and CBC.  Juve Ramsey, MonaeD

## 2018-05-15 ENCOUNTER — TELEPHONE (OUTPATIENT)
Dept: VASCULAR LAB | Facility: MEDICAL CENTER | Age: 63
End: 2018-05-15

## 2018-05-23 ENCOUNTER — TELEPHONE (OUTPATIENT)
Dept: VASCULAR LAB | Facility: MEDICAL CENTER | Age: 63
End: 2018-05-23

## 2018-06-21 ENCOUNTER — TELEPHONE (OUTPATIENT)
Dept: VASCULAR LAB | Facility: MEDICAL CENTER | Age: 63
End: 2018-06-21

## 2018-06-21 NOTE — TELEPHONE ENCOUNTER
Left voicemail reminder to get labs drawn for Radha Quan, PharmD      4th call, compliance letter sent

## 2018-06-22 ENCOUNTER — HOSPITAL ENCOUNTER (OUTPATIENT)
Dept: LAB | Facility: MEDICAL CENTER | Age: 63
End: 2018-06-22
Attending: NURSE PRACTITIONER
Payer: MEDICAID

## 2018-06-22 DIAGNOSIS — I82.409 DEEP VEIN THROMBOSIS (DVT) OF LOWER EXTREMITY, UNSPECIFIED CHRONICITY, UNSPECIFIED LATERALITY, UNSPECIFIED VEIN (HCC): ICD-10-CM

## 2018-06-22 LAB
ALBUMIN SERPL BCP-MCNC: 3.7 G/DL (ref 3.2–4.9)
ALBUMIN/GLOB SERPL: 1 G/DL
ALP SERPL-CCNC: 117 U/L (ref 30–99)
ALT SERPL-CCNC: 12 U/L (ref 2–50)
ANION GAP SERPL CALC-SCNC: 9 MMOL/L (ref 0–11.9)
AST SERPL-CCNC: 19 U/L (ref 12–45)
BILIRUB SERPL-MCNC: 0.4 MG/DL (ref 0.1–1.5)
BUN SERPL-MCNC: 14 MG/DL (ref 8–22)
CALCIUM SERPL-MCNC: 9.3 MG/DL (ref 8.5–10.5)
CHLORIDE SERPL-SCNC: 106 MMOL/L (ref 96–112)
CO2 SERPL-SCNC: 29 MMOL/L (ref 20–33)
CREAT SERPL-MCNC: 0.77 MG/DL (ref 0.5–1.4)
ERYTHROCYTE [DISTWIDTH] IN BLOOD BY AUTOMATED COUNT: 56.6 FL (ref 35.9–50)
GLOBULIN SER CALC-MCNC: 3.7 G/DL (ref 1.9–3.5)
GLUCOSE SERPL-MCNC: 131 MG/DL (ref 65–99)
HCT VFR BLD AUTO: 44.2 % (ref 37–47)
HGB BLD-MCNC: 13.3 G/DL (ref 12–16)
MCH RBC QN AUTO: 28.5 PG (ref 27–33)
MCHC RBC AUTO-ENTMCNC: 30.1 G/DL (ref 33.6–35)
MCV RBC AUTO: 94.6 FL (ref 81.4–97.8)
PLATELET # BLD AUTO: 281 K/UL (ref 164–446)
PMV BLD AUTO: 10 FL (ref 9–12.9)
POTASSIUM SERPL-SCNC: 4.7 MMOL/L (ref 3.6–5.5)
PROT SERPL-MCNC: 7.4 G/DL (ref 6–8.2)
RBC # BLD AUTO: 4.67 M/UL (ref 4.2–5.4)
SODIUM SERPL-SCNC: 144 MMOL/L (ref 135–145)
WBC # BLD AUTO: 7.2 K/UL (ref 4.8–10.8)

## 2018-06-22 PROCEDURE — 36415 COLL VENOUS BLD VENIPUNCTURE: CPT

## 2018-06-22 PROCEDURE — 85027 COMPLETE CBC AUTOMATED: CPT

## 2018-06-22 PROCEDURE — 80053 COMPREHEN METABOLIC PANEL: CPT

## 2018-06-26 ENCOUNTER — TELEPHONE (OUTPATIENT)
Dept: VASCULAR LAB | Facility: MEDICAL CENTER | Age: 63
End: 2018-06-26

## 2018-06-26 NOTE — TELEPHONE ENCOUNTER
Patient called into the clinic today to inform she did complete labs ordered for Xarelto therapy. Results are in pt's chart.    Marques Tamayo. Ass't  [facility]

## 2018-08-01 ENCOUNTER — TELEPHONE (OUTPATIENT)
Dept: VASCULAR LAB | Facility: MEDICAL CENTER | Age: 63
End: 2018-08-01

## 2018-08-01 DIAGNOSIS — Z79.01 CHRONIC ANTICOAGULATION: ICD-10-CM

## 2018-08-01 DIAGNOSIS — I82.403 ACUTE DEEP VEIN THROMBOSIS (DVT) OF BOTH LOWER EXTREMITIES, UNSPECIFIED VEIN (HCC): ICD-10-CM

## 2019-04-04 ENCOUNTER — HOSPITAL ENCOUNTER (OUTPATIENT)
Dept: LAB | Facility: MEDICAL CENTER | Age: 64
End: 2019-04-04
Attending: FAMILY MEDICINE
Payer: MEDICARE

## 2019-04-04 LAB
ALBUMIN SERPL BCP-MCNC: 3.7 G/DL (ref 3.2–4.9)
ALBUMIN/GLOB SERPL: 0.9 G/DL
ALP SERPL-CCNC: 111 U/L (ref 30–99)
ALT SERPL-CCNC: 9 U/L (ref 2–50)
ANION GAP SERPL CALC-SCNC: 3 MMOL/L (ref 0–11.9)
AST SERPL-CCNC: 15 U/L (ref 12–45)
BILIRUB SERPL-MCNC: 0.4 MG/DL (ref 0.1–1.5)
BUN SERPL-MCNC: 17 MG/DL (ref 8–22)
CALCIUM SERPL-MCNC: 8.7 MG/DL (ref 8.5–10.5)
CHLORIDE SERPL-SCNC: 104 MMOL/L (ref 96–112)
CHOLEST SERPL-MCNC: 158 MG/DL (ref 100–199)
CO2 SERPL-SCNC: 31 MMOL/L (ref 20–33)
CREAT SERPL-MCNC: 0.64 MG/DL (ref 0.5–1.4)
EST. AVERAGE GLUCOSE BLD GHB EST-MCNC: 157 MG/DL
FASTING STATUS PATIENT QL REPORTED: NORMAL
GLOBULIN SER CALC-MCNC: 3.9 G/DL (ref 1.9–3.5)
GLUCOSE SERPL-MCNC: 114 MG/DL (ref 65–99)
HBA1C MFR BLD: 7.1 % (ref 0–5.6)
HDLC SERPL-MCNC: 52 MG/DL
LDLC SERPL CALC-MCNC: 81 MG/DL
POTASSIUM SERPL-SCNC: 4 MMOL/L (ref 3.6–5.5)
PROT SERPL-MCNC: 7.6 G/DL (ref 6–8.2)
SODIUM SERPL-SCNC: 138 MMOL/L (ref 135–145)
TRIGL SERPL-MCNC: 123 MG/DL (ref 0–149)

## 2019-04-04 PROCEDURE — 80061 LIPID PANEL: CPT

## 2019-04-04 PROCEDURE — 80053 COMPREHEN METABOLIC PANEL: CPT

## 2019-04-04 PROCEDURE — 36415 COLL VENOUS BLD VENIPUNCTURE: CPT

## 2019-04-04 PROCEDURE — 83036 HEMOGLOBIN GLYCOSYLATED A1C: CPT

## 2019-05-09 ENCOUNTER — TELEPHONE (OUTPATIENT)
Dept: VASCULAR LAB | Facility: MEDICAL CENTER | Age: 64
End: 2019-05-09

## 2019-05-09 DIAGNOSIS — I82.403 ACUTE DEEP VEIN THROMBOSIS (DVT) OF BOTH LOWER EXTREMITIES, UNSPECIFIED VEIN (HCC): ICD-10-CM

## 2019-05-09 NOTE — TELEPHONE ENCOUNTER
LM on VM that her DOAC labs are due. Informed that DOAC's are high risk medication and that being said they require follow up appt in the clinic as well as lab work every 6 months to make sure the pt is safe.  Phone number left on VM. EMILIA De La Cruz.

## 2019-05-16 ENCOUNTER — TELEPHONE (OUTPATIENT)
Dept: VASCULAR LAB | Facility: MEDICAL CENTER | Age: 64
End: 2019-05-16

## 2019-06-17 ENCOUNTER — ANTICOAGULATION MONITORING (OUTPATIENT)
Dept: VASCULAR LAB | Facility: MEDICAL CENTER | Age: 64
End: 2019-06-17

## 2019-06-17 DIAGNOSIS — I82.403 ACUTE DEEP VEIN THROMBOSIS (DVT) OF BOTH LOWER EXTREMITIES, UNSPECIFIED VEIN (HCC): ICD-10-CM

## 2019-06-17 DIAGNOSIS — Z79.01 CHRONIC ANTICOAGULATION: ICD-10-CM

## 2019-06-17 NOTE — PROGRESS NOTES
Pt is taking Xarelto 20 mg.  H&H was stable last yr needs one next test and has been ordered. Cret is 0.64 and cert cl is   Cockcroft & Gault (Actual body weight): 206.4 (ml/min)     Unable to talk with this pt as she is not accepting calls. Next test is due in Oct.

## 2019-06-20 ENCOUNTER — TELEPHONE (OUTPATIENT)
Dept: VASCULAR LAB | Facility: MEDICAL CENTER | Age: 64
End: 2019-06-20

## 2019-06-20 DIAGNOSIS — Z79.01 CHRONIC ANTICOAGULATION: ICD-10-CM

## 2019-06-20 DIAGNOSIS — I82.403 ACUTE DEEP VEIN THROMBOSIS (DVT) OF BOTH LOWER EXTREMITIES, UNSPECIFIED VEIN (HCC): ICD-10-CM

## 2019-08-13 DIAGNOSIS — Z79.01 CHRONIC ANTICOAGULATION: ICD-10-CM

## 2019-08-24 ENCOUNTER — HOSPITAL ENCOUNTER (OUTPATIENT)
Dept: LAB | Facility: MEDICAL CENTER | Age: 64
End: 2019-08-24
Attending: NURSE PRACTITIONER
Payer: COMMERCIAL

## 2019-08-24 DIAGNOSIS — Z79.01 CHRONIC ANTICOAGULATION: ICD-10-CM

## 2019-08-24 DIAGNOSIS — I82.403 ACUTE DEEP VEIN THROMBOSIS (DVT) OF BOTH LOWER EXTREMITIES, UNSPECIFIED VEIN (HCC): ICD-10-CM

## 2019-08-24 LAB
ANION GAP SERPL CALC-SCNC: 9 MMOL/L (ref 0–11.9)
BASOPHILS # BLD AUTO: 0.8 % (ref 0–1.8)
BASOPHILS # BLD: 0.05 K/UL (ref 0–0.12)
BUN SERPL-MCNC: 7 MG/DL (ref 8–22)
CALCIUM SERPL-MCNC: 9.5 MG/DL (ref 8.5–10.5)
CHLORIDE SERPL-SCNC: 103 MMOL/L (ref 96–112)
CO2 SERPL-SCNC: 27 MMOL/L (ref 20–33)
CREAT SERPL-MCNC: 0.63 MG/DL (ref 0.5–1.4)
EOSINOPHIL # BLD AUTO: 0.11 K/UL (ref 0–0.51)
EOSINOPHIL NFR BLD: 1.9 % (ref 0–6.9)
ERYTHROCYTE [DISTWIDTH] IN BLOOD BY AUTOMATED COUNT: 54.2 FL (ref 35.9–50)
FASTING STATUS PATIENT QL REPORTED: NORMAL
GLUCOSE SERPL-MCNC: 148 MG/DL (ref 65–99)
HCT VFR BLD AUTO: 45.2 % (ref 37–47)
HGB BLD-MCNC: 13.7 G/DL (ref 12–16)
IMM GRANULOCYTES # BLD AUTO: 0.01 K/UL (ref 0–0.11)
IMM GRANULOCYTES NFR BLD AUTO: 0.2 % (ref 0–0.9)
LYMPHOCYTES # BLD AUTO: 1.3 K/UL (ref 1–4.8)
LYMPHOCYTES NFR BLD: 21.9 % (ref 22–41)
MCH RBC QN AUTO: 28.4 PG (ref 27–33)
MCHC RBC AUTO-ENTMCNC: 30.3 G/DL (ref 33.6–35)
MCV RBC AUTO: 93.8 FL (ref 81.4–97.8)
MONOCYTES # BLD AUTO: 0.43 K/UL (ref 0–0.85)
MONOCYTES NFR BLD AUTO: 7.3 % (ref 0–13.4)
NEUTROPHILS # BLD AUTO: 4.03 K/UL (ref 2–7.15)
NEUTROPHILS NFR BLD: 67.9 % (ref 44–72)
NRBC # BLD AUTO: 0 K/UL
NRBC BLD-RTO: 0 /100 WBC
PLATELET # BLD AUTO: 312 K/UL (ref 164–446)
PMV BLD AUTO: 10.5 FL (ref 9–12.9)
POTASSIUM SERPL-SCNC: 3.9 MMOL/L (ref 3.6–5.5)
RBC # BLD AUTO: 4.82 M/UL (ref 4.2–5.4)
SODIUM SERPL-SCNC: 139 MMOL/L (ref 135–145)
WBC # BLD AUTO: 5.9 K/UL (ref 4.8–10.8)

## 2019-08-24 PROCEDURE — 36415 COLL VENOUS BLD VENIPUNCTURE: CPT

## 2019-08-24 PROCEDURE — 85025 COMPLETE CBC W/AUTO DIFF WBC: CPT

## 2019-08-24 PROCEDURE — 80048 BASIC METABOLIC PNL TOTAL CA: CPT

## 2019-08-28 ENCOUNTER — ANTICOAGULATION VISIT (OUTPATIENT)
Dept: VASCULAR LAB | Facility: MEDICAL CENTER | Age: 64
End: 2019-08-28
Attending: INTERNAL MEDICINE
Payer: COMMERCIAL

## 2019-08-28 DIAGNOSIS — Z86.718 HISTORY OF DVT (DEEP VEIN THROMBOSIS): ICD-10-CM

## 2019-08-28 DIAGNOSIS — I82.403 ACUTE DEEP VEIN THROMBOSIS (DVT) OF BOTH LOWER EXTREMITIES, UNSPECIFIED VEIN (HCC): ICD-10-CM

## 2019-08-28 DIAGNOSIS — Z79.01 CHRONIC ANTICOAGULATION: ICD-10-CM

## 2019-08-28 LAB
INR BLD: 1.2 (ref 0.9–1.2)
INR PPP: 1.2 (ref 2–3.5)

## 2019-08-28 PROCEDURE — 85610 PROTHROMBIN TIME: CPT

## 2019-08-28 RX ORDER — PANTOPRAZOLE SODIUM 20 MG/1
20 TABLET, DELAYED RELEASE ORAL DAILY
COMMUNITY

## 2019-08-28 RX ORDER — METFORMIN HYDROCHLORIDE 500 MG/1
500 TABLET, EXTENDED RELEASE ORAL 2 TIMES DAILY
COMMUNITY

## 2019-08-28 NOTE — PROGRESS NOTES
Anticoagulation Summary  As of 2019    INR goal:      TTR:   --   INR used for dosin.20 (2019)   Warfarin maintenance plan:   No maintenance plan   Next INR check:   2020   Target end date:   Indefinite    Indications    DVT (deep venous thrombosis) (CMS-[facility]) [I82.409]  Chronic anticoagulation [Z79.01]             Anticoagulation Episode Summary     INR check location:       Preferred lab:       Send INR reminders to:       Comments:         Anticoagulation Care Providers     Provider Role Specialty Phone number    Tomasa Martinez M.D. Referring Internal Medicine [phone]    Michael J Bloch, M.D. Referring Internal Medicine [phone]    [facility] Responsible  [phone]        Anticoagulation Patient Findings                Target end date:Indefinite     Indication: DVT     Drug: Xarelto     CHADsVASC = n/a    Health Status Since Last Assessment   Patient denies any new relevant medical problems, ED visits or hospitalizations   Patient denies any embolic events (stroke/tia/systemic embolism)    Adherence with DOAC Therapy   Pt has NO missed any doses in the average week    Bleeding Risk Assessment     Negative Epistaxis   Pt denies any excessive or unusual bleeding/hematomas.  Pt denies any GI bleeds or hematemesis.  Pt denies any concerning daily headache or sub dural hematoma symptoms.     Pt denies any hematuria or abnormal vaginal bleeding.   Latest Hemoglobin 13.7   ETOH overuse Negative     Creatinine Clearance/Renal Function     Latest ClCr 101.4 mL/min     Drug Interactions   Platelets: 312   ASA/other antiplatelets Negative   NSAID Negative   Other drug interactions Negative   X Verified no anticonvulsant or azole therapy, education provided for future use.     Examination   Blood Pressure WNL   Symptomatic hypotension Negative   Significant gait impairment/imbalance/high risk for falls? Negative    Final Assessment and Recommendations:   Patient  appears stable from the anticoagulation staindpoint.     Benefits of continued DOAC therapy outweigh risks for this patient   Recommend pt continue with current DOAC therapy Xarelto 20mg (with dose adjustment)   Patient states she is having sharp pain behind her knee for the past two months, similar to discomfort experienced with last clot.  She would like to have some sort of diagnostic done to see if a new clot has appeared.  Order placed for LLE ultrasound.    Other Actions: cmp/ cbc hemogram ordered prior to next visit    Follow up:   Will follow up with patient 6 months.     Patient has Medicare Advantage plan now and can no longer be seen in clinic, will add her to phone panel for continued monitoring.     Juve Ramsey, PharmD, BCACP

## 2019-08-29 ENCOUNTER — ANTICOAGULATION MONITORING (OUTPATIENT)
Dept: VASCULAR LAB | Facility: MEDICAL CENTER | Age: 64
End: 2019-08-29

## 2019-08-29 DIAGNOSIS — Z79.01 CHRONIC ANTICOAGULATION: ICD-10-CM

## 2019-08-29 DIAGNOSIS — I82.403 ACUTE DEEP VEIN THROMBOSIS (DVT) OF BOTH LOWER EXTREMITIES, UNSPECIFIED VEIN (HCC): ICD-10-CM

## 2020-01-08 ENCOUNTER — APPOINTMENT (OUTPATIENT)
Dept: LAB | Facility: MEDICAL CENTER | Age: 65
End: 2020-01-08
Payer: MEDICARE

## 2020-02-06 ENCOUNTER — TELEPHONE (OUTPATIENT)
Dept: VASCULAR LAB | Facility: MEDICAL CENTER | Age: 65
End: 2020-02-06

## 2020-02-06 DIAGNOSIS — Z79.01 CHRONIC ANTICOAGULATION: ICD-10-CM

## 2020-02-20 ENCOUNTER — TELEPHONE (OUTPATIENT)
Dept: VASCULAR LAB | Facility: MEDICAL CENTER | Age: 65
End: 2020-02-20

## 2020-02-21 NOTE — TELEPHONE ENCOUNTER
LM on VM that the DOAC labs are due. Informed that DOAC's are high risk medication and that being said they require follow up appt in the clinic as well as lab work every 6 months to make sure the pt is safe.  Phone number left on VM. EMILIA De La Cruz.

## 2020-04-09 ENCOUNTER — ANTICOAGULATION MONITORING (OUTPATIENT)
Dept: VASCULAR LAB | Facility: MEDICAL CENTER | Age: 65
End: 2020-04-09

## 2020-04-09 DIAGNOSIS — Z79.01 CHRONIC ANTICOAGULATION: ICD-10-CM

## 2020-04-20 RX ORDER — RIVAROXABAN 20 MG/1
TABLET, FILM COATED ORAL
Qty: 90 TAB | Refills: 0 | OUTPATIENT
Start: 2020-04-20

## 2021-03-03 DIAGNOSIS — Z23 NEED FOR VACCINATION: ICD-10-CM

## 2024-04-07 ENCOUNTER — APPOINTMENT (OUTPATIENT)
Dept: RADIOLOGY | Facility: MEDICAL CENTER | Age: 69
End: 2024-04-07
Attending: EMERGENCY MEDICINE
Payer: MEDICARE

## 2024-04-07 ENCOUNTER — HOSPITAL ENCOUNTER (OUTPATIENT)
Facility: MEDICAL CENTER | Age: 69
End: 2024-04-12
Attending: EMERGENCY MEDICINE | Admitting: INTERNAL MEDICINE
Payer: MEDICARE

## 2024-04-07 DIAGNOSIS — I48.91 NEW ONSET ATRIAL FIBRILLATION (HCC): ICD-10-CM

## 2024-04-07 DIAGNOSIS — M62.82 NON-TRAUMATIC RHABDOMYOLYSIS: ICD-10-CM
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
## 2024-04-07 DIAGNOSIS — E86.0 DEHYDRATION: ICD-10-CM

## 2024-04-07 DIAGNOSIS — I27.20 PULMONARY HYPERTENSION (HCC): ICD-10-CM

## 2024-04-07 DIAGNOSIS — S32.040A COMPRESSION FRACTURE OF L4 LUMBAR VERTEBRA, CLOSED, INITIAL ENCOUNTER (HCC): ICD-10-CM

## 2024-04-07 DIAGNOSIS — R53.1 WEAKNESS: ICD-10-CM

## 2024-04-07 DIAGNOSIS — S32.000A LUMBAR COMPRESSION FRACTURE, CLOSED, INITIAL ENCOUNTER (HCC): ICD-10-CM

## 2024-04-07 DIAGNOSIS — S32.020A COMPRESSION FRACTURE OF L2 VERTEBRA, INITIAL ENCOUNTER (HCC): ICD-10-CM

## 2024-04-07 PROBLEM — S09.90XA HEAD INJURY: Status: ACTIVE | Noted: 2024-04-07

## 2024-04-07 PROBLEM — G93.40 ENCEPHALOPATHY: Status: ACTIVE | Noted: 2024-04-07

## 2024-04-07 PROBLEM — Z86.718 HISTORY OF DVT (DEEP VEIN THROMBOSIS): Status: ACTIVE | Noted: 2024-04-07

## 2024-04-07 PROBLEM — E87.20 LACTIC ACID ACIDOSIS: Status: ACTIVE | Noted: 2024-04-07

## 2024-04-07 LAB
ALBUMIN SERPL BCP-MCNC: 2.8 G/DL (ref 3.2–4.9)
ALBUMIN/GLOB SERPL: 0.8 G/DL
ALP SERPL-CCNC: 146 U/L (ref 30–99)
ALT SERPL-CCNC: 14 U/L (ref 2–50)
ANION GAP SERPL CALC-SCNC: 13 MMOL/L (ref 7–16)
APPEARANCE UR: CLEAR
AST SERPL-CCNC: 52 U/L (ref 12–45)
BASOPHILS # BLD AUTO: 0.4 % (ref 0–1.8)
BASOPHILS # BLD: 0.03 K/UL (ref 0–0.12)
BILIRUB SERPL-MCNC: 0.7 MG/DL (ref 0.1–1.5)
BILIRUB UR QL STRIP.AUTO: NEGATIVE
BUN SERPL-MCNC: 9 MG/DL (ref 8–22)
CALCIUM ALBUM COR SERPL-MCNC: 9.5 MG/DL (ref 8.5–10.5)
CALCIUM SERPL-MCNC: 8.5 MG/DL (ref 8.5–10.5)
CHLORIDE SERPL-SCNC: 103 MMOL/L (ref 96–112)
CK SERPL-CCNC: 1182 U/L (ref 0–154)
CO2 SERPL-SCNC: 24 MMOL/L (ref 20–33)
COLOR UR: YELLOW
CREAT SERPL-MCNC: 0.4 MG/DL (ref 0.5–1.4)
EKG IMPRESSION: NORMAL
EOSINOPHIL # BLD AUTO: 0.03 K/UL (ref 0–0.51)
EOSINOPHIL NFR BLD: 0.4 % (ref 0–6.9)
ERYTHROCYTE [DISTWIDTH] IN BLOOD BY AUTOMATED COUNT: 53.6 FL (ref 35.9–50)
GFR SERPLBLD CREATININE-BSD FMLA CKD-EPI: 106 ML/MIN/1.73 M 2
GLOBULIN SER CALC-MCNC: 3.6 G/DL (ref 1.9–3.5)
GLUCOSE SERPL-MCNC: 104 MG/DL (ref 65–99)
GLUCOSE UR STRIP.AUTO-MCNC: NEGATIVE MG/DL
HCT VFR BLD AUTO: 35 % (ref 37–47)
HGB BLD-MCNC: 11.1 G/DL (ref 12–16)
IMM GRANULOCYTES # BLD AUTO: 0.02 K/UL (ref 0–0.11)
IMM GRANULOCYTES NFR BLD AUTO: 0.2 % (ref 0–0.9)
KETONES UR STRIP.AUTO-MCNC: NEGATIVE MG/DL
LACTATE SERPL-SCNC: 1.6 MMOL/L (ref 0.5–2)
LACTATE SERPL-SCNC: 2.1 MMOL/L (ref 0.5–2)
LACTATE SERPL-SCNC: 2.2 MMOL/L (ref 0.5–2)
LEUKOCYTE ESTERASE UR QL STRIP.AUTO: NEGATIVE
LYMPHOCYTES # BLD AUTO: 1.21 K/UL (ref 1–4.8)
LYMPHOCYTES NFR BLD: 14.6 % (ref 22–41)
MCH RBC QN AUTO: 26.7 PG (ref 27–33)
MCHC RBC AUTO-ENTMCNC: 31.7 G/DL (ref 32.2–35.5)
MCV RBC AUTO: 84.3 FL (ref 81.4–97.8)
MICRO URNS: NORMAL
MONOCYTES # BLD AUTO: 0.72 K/UL (ref 0–0.85)
MONOCYTES NFR BLD AUTO: 8.7 % (ref 0–13.4)
NEUTROPHILS # BLD AUTO: 6.27 K/UL (ref 1.82–7.42)
NEUTROPHILS NFR BLD: 75.7 % (ref 44–72)
NITRITE UR QL STRIP.AUTO: NEGATIVE
NRBC # BLD AUTO: 0 K/UL
NRBC BLD-RTO: 0 /100 WBC (ref 0–0.2)
PH UR STRIP.AUTO: 7.5 [PH] (ref 5–8)
PLATELET # BLD AUTO: 256 K/UL (ref 164–446)
PMV BLD AUTO: 10.1 FL (ref 9–12.9)
POTASSIUM SERPL-SCNC: 3.4 MMOL/L (ref 3.6–5.5)
PROT SERPL-MCNC: 6.4 G/DL (ref 6–8.2)
PROT UR QL STRIP: NEGATIVE MG/DL
RBC # BLD AUTO: 4.15 M/UL (ref 4.2–5.4)
RBC UR QL AUTO: NEGATIVE
SODIUM SERPL-SCNC: 140 MMOL/L (ref 135–145)
SP GR UR STRIP.AUTO: 1.01
UROBILINOGEN UR STRIP.AUTO-MCNC: 0.2 MG/DL
WBC # BLD AUTO: 8.3 K/UL (ref 4.8–10.8)

## 2024-04-07 PROCEDURE — 700105 HCHG RX REV CODE 258: Mod: UD | Performed by: EMERGENCY MEDICINE

## 2024-04-07 PROCEDURE — 96375 TX/PRO/DX INJ NEW DRUG ADDON: CPT

## 2024-04-07 PROCEDURE — 87040 BLOOD CULTURE FOR BACTERIA: CPT | Mod: 91

## 2024-04-07 PROCEDURE — 71045 X-RAY EXAM CHEST 1 VIEW: CPT

## 2024-04-07 PROCEDURE — 80053 COMPREHEN METABOLIC PANEL: CPT

## 2024-04-07 PROCEDURE — 99223 1ST HOSP IP/OBS HIGH 75: CPT | Performed by: INTERNAL MEDICINE

## 2024-04-07 PROCEDURE — 700101 HCHG RX REV CODE 250: Performed by: INTERNAL MEDICINE

## 2024-04-07 PROCEDURE — 73552 X-RAY EXAM OF FEMUR 2/>: CPT | Mod: RT

## 2024-04-07 PROCEDURE — A9270 NON-COVERED ITEM OR SERVICE: HCPCS | Performed by: INTERNAL MEDICINE

## 2024-04-07 PROCEDURE — 700111 HCHG RX REV CODE 636 W/ 250 OVERRIDE (IP): Mod: JZ,JG,UD | Performed by: EMERGENCY MEDICINE

## 2024-04-07 PROCEDURE — 96374 THER/PROPH/DIAG INJ IV PUSH: CPT

## 2024-04-07 PROCEDURE — 70486 CT MAXILLOFACIAL W/O DYE: CPT

## 2024-04-07 PROCEDURE — 72131 CT LUMBAR SPINE W/O DYE: CPT

## 2024-04-07 PROCEDURE — 770020 HCHG ROOM/CARE - TELE (206)

## 2024-04-07 PROCEDURE — 83605 ASSAY OF LACTIC ACID: CPT | Mod: 91

## 2024-04-07 PROCEDURE — 87077 CULTURE AEROBIC IDENTIFY: CPT | Mod: 91

## 2024-04-07 PROCEDURE — 87086 URINE CULTURE/COLONY COUNT: CPT

## 2024-04-07 PROCEDURE — 700105 HCHG RX REV CODE 258: Performed by: INTERNAL MEDICINE

## 2024-04-07 PROCEDURE — 700102 HCHG RX REV CODE 250 W/ 637 OVERRIDE(OP): Performed by: INTERNAL MEDICINE

## 2024-04-07 PROCEDURE — 70450 CT HEAD/BRAIN W/O DYE: CPT

## 2024-04-07 PROCEDURE — 82550 ASSAY OF CK (CPK): CPT

## 2024-04-07 PROCEDURE — 87150 DNA/RNA AMPLIFIED PROBE: CPT

## 2024-04-07 PROCEDURE — 99285 EMERGENCY DEPT VISIT HI MDM: CPT

## 2024-04-07 PROCEDURE — 93005 ELECTROCARDIOGRAM TRACING: CPT | Performed by: EMERGENCY MEDICINE

## 2024-04-07 PROCEDURE — 85025 COMPLETE CBC W/AUTO DIFF WBC: CPT

## 2024-04-07 PROCEDURE — 36415 COLL VENOUS BLD VENIPUNCTURE: CPT

## 2024-04-07 PROCEDURE — 81003 URINALYSIS AUTO W/O SCOPE: CPT

## 2024-04-07 RX ORDER — MORPHINE SULFATE 4 MG/ML
2 INJECTION INTRAVENOUS
Status: DISCONTINUED | OUTPATIENT
Start: 2024-04-07 | End: 2024-04-07

## 2024-04-07 RX ORDER — SODIUM CHLORIDE, SODIUM LACTATE, POTASSIUM CHLORIDE, CALCIUM CHLORIDE 600; 310; 30; 20 MG/100ML; MG/100ML; MG/100ML; MG/100ML
1000 INJECTION, SOLUTION INTRAVENOUS ONCE
Status: COMPLETED | OUTPATIENT
Start: 2024-04-07 | End: 2024-04-07

## 2024-04-07 RX ORDER — OXYCODONE HYDROCHLORIDE 10 MG/1
10 TABLET ORAL
Status: DISCONTINUED | OUTPATIENT
Start: 2024-04-07 | End: 2024-04-12 | Stop reason: HOSPADM

## 2024-04-07 RX ORDER — OXYCODONE HYDROCHLORIDE 5 MG/1
5 TABLET ORAL
Status: DISCONTINUED | OUTPATIENT
Start: 2024-04-07 | End: 2024-04-12 | Stop reason: HOSPADM

## 2024-04-07 RX ORDER — HYDRALAZINE HYDROCHLORIDE 20 MG/ML
10 INJECTION INTRAMUSCULAR; INTRAVENOUS EVERY 4 HOURS PRN
Status: DISCONTINUED | OUTPATIENT
Start: 2024-04-07 | End: 2024-04-08

## 2024-04-07 RX ORDER — ONDANSETRON 2 MG/ML
4 INJECTION INTRAMUSCULAR; INTRAVENOUS EVERY 4 HOURS PRN
Status: DISCONTINUED | OUTPATIENT
Start: 2024-04-07 | End: 2024-04-12 | Stop reason: HOSPADM

## 2024-04-07 RX ORDER — MORPHINE SULFATE 4 MG/ML
4 INJECTION INTRAVENOUS
Status: DISCONTINUED | OUTPATIENT
Start: 2024-04-07 | End: 2024-04-12 | Stop reason: HOSPADM

## 2024-04-07 RX ORDER — ONDANSETRON 2 MG/ML
4 INJECTION INTRAMUSCULAR; INTRAVENOUS ONCE
Status: COMPLETED | OUTPATIENT
Start: 2024-04-07 | End: 2024-04-07

## 2024-04-07 RX ORDER — ONDANSETRON 4 MG/1
4 TABLET, ORALLY DISINTEGRATING ORAL EVERY 4 HOURS PRN
Status: DISCONTINUED | OUTPATIENT
Start: 2024-04-07 | End: 2024-04-12 | Stop reason: HOSPADM

## 2024-04-07 RX ORDER — POLYETHYLENE GLYCOL 3350 17 G/17G
1 POWDER, FOR SOLUTION ORAL
Status: DISCONTINUED | OUTPATIENT
Start: 2024-04-07 | End: 2024-04-12 | Stop reason: HOSPADM

## 2024-04-07 RX ORDER — METOPROLOL SUCCINATE 25 MG/1
25 TABLET, EXTENDED RELEASE ORAL
Status: DISCONTINUED | OUTPATIENT
Start: 2024-04-07 | End: 2024-04-12 | Stop reason: HOSPADM

## 2024-04-07 RX ORDER — SODIUM CHLORIDE 9 MG/ML
INJECTION, SOLUTION INTRAVENOUS CONTINUOUS
Status: DISCONTINUED | OUTPATIENT
Start: 2024-04-07 | End: 2024-04-08

## 2024-04-07 RX ORDER — ALPRAZOLAM 0.25 MG/1
0.25 TABLET ORAL 3 TIMES DAILY PRN
Status: DISCONTINUED | OUTPATIENT
Start: 2024-04-07 | End: 2024-04-08

## 2024-04-07 RX ORDER — PANTOPRAZOLE SODIUM 20 MG/1
20 TABLET, DELAYED RELEASE ORAL DAILY
Status: DISCONTINUED | OUTPATIENT
Start: 2024-04-08 | End: 2024-04-07

## 2024-04-07 RX ORDER — MORPHINE SULFATE 4 MG/ML
4 INJECTION INTRAVENOUS ONCE
Status: DISCONTINUED | OUTPATIENT
Start: 2024-04-07 | End: 2024-04-07

## 2024-04-07 RX ORDER — ACETAMINOPHEN 325 MG/1
650 TABLET ORAL EVERY 6 HOURS PRN
Status: DISCONTINUED | OUTPATIENT
Start: 2024-04-07 | End: 2024-04-12 | Stop reason: HOSPADM

## 2024-04-07 RX ORDER — ENOXAPARIN SODIUM 150 MG/ML
120 INJECTION SUBCUTANEOUS 2 TIMES DAILY
Status: DISCONTINUED | OUTPATIENT
Start: 2024-04-07 | End: 2024-04-07

## 2024-04-07 RX ORDER — OMEPRAZOLE 20 MG/1
20 CAPSULE, DELAYED RELEASE ORAL DAILY
Status: DISCONTINUED | OUTPATIENT
Start: 2024-04-08 | End: 2024-04-12 | Stop reason: HOSPADM

## 2024-04-07 RX ORDER — LIDOCAINE 4 G/G
1 PATCH TOPICAL EVERY 24 HOURS
Status: DISCONTINUED | OUTPATIENT
Start: 2024-04-07 | End: 2024-04-12 | Stop reason: HOSPADM

## 2024-04-07 RX ORDER — MORPHINE SULFATE 4 MG/ML
2 INJECTION INTRAVENOUS ONCE
Status: DISCONTINUED | OUTPATIENT
Start: 2024-04-07 | End: 2024-04-07 | Stop reason: CLARIF

## 2024-04-07 RX ORDER — AMOXICILLIN 250 MG
2 CAPSULE ORAL EVERY EVENING
Status: DISCONTINUED | OUTPATIENT
Start: 2024-04-07 | End: 2024-04-12 | Stop reason: HOSPADM

## 2024-04-07 RX ORDER — MORPHINE SULFATE 4 MG/ML
2 INJECTION INTRAVENOUS ONCE
Status: COMPLETED | OUTPATIENT
Start: 2024-04-07 | End: 2024-04-07

## 2024-04-07 RX ORDER — DULOXETIN HYDROCHLORIDE 20 MG/1
20 CAPSULE, DELAYED RELEASE ORAL 2 TIMES DAILY
Status: DISCONTINUED | OUTPATIENT
Start: 2024-04-07 | End: 2024-04-12 | Stop reason: HOSPADM

## 2024-04-07 RX ORDER — METOPROLOL TARTRATE 1 MG/ML
5 INJECTION, SOLUTION INTRAVENOUS
Status: DISCONTINUED | OUTPATIENT
Start: 2024-04-07 | End: 2024-04-12 | Stop reason: HOSPADM

## 2024-04-07 RX ADMIN — DULOXETINE HYDROCHLORIDE 20 MG: 20 CAPSULE, DELAYED RELEASE ORAL at 22:58

## 2024-04-07 RX ADMIN — SODIUM CHLORIDE, POTASSIUM CHLORIDE, SODIUM LACTATE AND CALCIUM CHLORIDE 1000 ML: 600; 310; 30; 20 INJECTION, SOLUTION INTRAVENOUS at 19:05

## 2024-04-07 RX ADMIN — METOPROLOL SUCCINATE 25 MG: 25 TABLET, EXTENDED RELEASE ORAL at 22:58

## 2024-04-07 RX ADMIN — SENNOSIDES AND DOCUSATE SODIUM 2 TABLET: 50; 8.6 TABLET ORAL at 22:18

## 2024-04-07 RX ADMIN — SODIUM CHLORIDE, POTASSIUM CHLORIDE, SODIUM LACTATE AND CALCIUM CHLORIDE 1000 ML: 600; 310; 30; 20 INJECTION, SOLUTION INTRAVENOUS at 14:23

## 2024-04-07 RX ADMIN — RIVAROXABAN 20 MG: 20 TABLET, FILM COATED ORAL at 22:58

## 2024-04-07 RX ADMIN — ONDANSETRON 4 MG: 2 INJECTION INTRAMUSCULAR; INTRAVENOUS at 18:37

## 2024-04-07 RX ADMIN — SODIUM CHLORIDE: 9 INJECTION, SOLUTION INTRAVENOUS at 22:17

## 2024-04-07 RX ADMIN — MORPHINE SULFATE 2 MG: 4 INJECTION, SOLUTION INTRAMUSCULAR; INTRAVENOUS at 17:28

## 2024-04-07 RX ADMIN — LIDOCAINE 1 PATCH: 4 PATCH TOPICAL at 22:18

## 2024-04-07 ASSESSMENT — COGNITIVE AND FUNCTIONAL STATUS - GENERAL
CLIMB 3 TO 5 STEPS WITH RAILING: TOTAL
TURNING FROM BACK TO SIDE WHILE IN FLAT BAD: A LOT
HELP NEEDED FOR BATHING: A LOT
PERSONAL GROOMING: A LOT
DRESSING REGULAR UPPER BODY CLOTHING: A LOT
MOVING FROM LYING ON BACK TO SITTING ON SIDE OF FLAT BED: A LOT
SUGGESTED CMS G CODE MODIFIER MOBILITY: CM
STANDING UP FROM CHAIR USING ARMS: TOTAL
SUGGESTED CMS G CODE MODIFIER DAILY ACTIVITY: CL
DRESSING REGULAR LOWER BODY CLOTHING: A LOT
WALKING IN HOSPITAL ROOM: TOTAL
EATING MEALS: A LOT
MOBILITY SCORE: 9
TOILETING: A LOT
MOVING TO AND FROM BED TO CHAIR: A LOT
DAILY ACTIVITIY SCORE: 12

## 2024-04-07 ASSESSMENT — PAIN DESCRIPTION - PAIN TYPE
TYPE: ACUTE PAIN

## 2024-04-07 ASSESSMENT — ENCOUNTER SYMPTOMS
WEIGHT LOSS: 0
ORTHOPNEA: 0
FLANK PAIN: 0
HEMOPTYSIS: 0
VOMITING: 0
PHOTOPHOBIA: 0
TREMORS: 0
CHILLS: 0
NECK PAIN: 0
BLURRED VISION: 0
NAUSEA: 0
NERVOUS/ANXIOUS: 0
FOCAL WEAKNESS: 0
HALLUCINATIONS: 0
DOUBLE VISION: 0
BACK PAIN: 1
COUGH: 0
SPEECH CHANGE: 0
FEVER: 0
BRUISES/BLEEDS EASILY: 0
PALPITATIONS: 0
POLYDIPSIA: 0
HEARTBURN: 0
HEADACHES: 0
FALLS: 1
SPUTUM PRODUCTION: 0

## 2024-04-07 ASSESSMENT — LIFESTYLE VARIABLES
TOTAL SCORE: 0
ON A TYPICAL DAY WHEN YOU DRINK ALCOHOL HOW MANY DRINKS DO YOU HAVE: 0
ALCOHOL_USE: NO
TOTAL SCORE: 0
SUBSTANCE_ABUSE: 0
HOW MANY TIMES IN THE PAST YEAR HAVE YOU HAD 5 OR MORE DRINKS IN A DAY: 0
DOES PATIENT WANT TO STOP DRINKING: NO
EVER HAD A DRINK FIRST THING IN THE MORNING TO STEADY YOUR NERVES TO GET RID OF A HANGOVER: NO
AVERAGE NUMBER OF DAYS PER WEEK YOU HAVE A DRINK CONTAINING ALCOHOL: 0
TOTAL SCORE: 0
CONSUMPTION TOTAL: NEGATIVE
EVER FELT BAD OR GUILTY ABOUT YOUR DRINKING: NO
HAVE PEOPLE ANNOYED YOU BY CRITICIZING YOUR DRINKING: NO
HAVE YOU EVER FELT YOU SHOULD CUT DOWN ON YOUR DRINKING: NO

## 2024-04-07 ASSESSMENT — PATIENT HEALTH QUESTIONNAIRE - PHQ9
3. TROUBLE FALLING OR STAYING ASLEEP OR SLEEPING TOO MUCH: SEVERAL DAYS
8. MOVING OR SPEAKING SO SLOWLY THAT OTHER PEOPLE COULD HAVE NOTICED. OR THE OPPOSITE, BEING SO FIGETY OR RESTLESS THAT YOU HAVE BEEN MOVING AROUND A LOT MORE THAN USUAL: NOT AT ALL
1. LITTLE INTEREST OR PLEASURE IN DOING THINGS: SEVERAL DAYS
7. TROUBLE CONCENTRATING ON THINGS, SUCH AS READING THE NEWSPAPER OR WATCHING TELEVISION: SEVERAL DAYS
9. THOUGHTS THAT YOU WOULD BE BETTER OFF DEAD, OR OF HURTING YOURSELF: NOT AT ALL
2. FEELING DOWN, DEPRESSED, IRRITABLE, OR HOPELESS: SEVERAL DAYS
SUM OF ALL RESPONSES TO PHQ9 QUESTIONS 1 AND 2: 2
6. FEELING BAD ABOUT YOURSELF - OR THAT YOU ARE A FAILURE OR HAVE LET YOURSELF OR YOUR FAMILY DOWN: NOT AL ALL
4. FEELING TIRED OR HAVING LITTLE ENERGY: SEVERAL DAYS
5. POOR APPETITE OR OVEREATING: SEVERAL DAYS
SUM OF ALL RESPONSES TO PHQ QUESTIONS 1-9: 6

## 2024-04-07 ASSESSMENT — FIBROSIS 4 INDEX
FIB4 SCORE: 3.8
FIB4 SCORE: 3.8

## 2024-04-07 NOTE — ED PROVIDER NOTES
CHIEF COMPLAINT  Chief Complaint   Patient presents with    T-5000 GLF     Pt tried to get out of her recliner last night, felt weak & fell to her knees. Friends found her on the ground this morning. Does not remember if she hit her head, however R eye appears bruised. On blood thinners for DVT, unsure which. Pt is oriented but groggy & does not fully recall falling. Pt smells of urine, is slow to respond.       LIMITATION TO HISTORY   Select: none    HPI    Nieves Murpyh is a 69 y.o. female who presents to the Emergency Department for evaluation following a TBI onset last night. EMS reports that the patient fell out of her recliner last night and she has been trying to get off the ground since then, she was found by friends about an hour ago on the ground. The patient does not think she lost consciousness or hit her head but there is swelling around her right eye. She has associated pain on her left side. She explains that she broke her left femur a few years ago and her left leg is weaker which caused her to fall. She has swelling on face around right eye. She currently takes blood thinners for a previous blood clot in her right leg.     OUTSIDE HISTORIAN(S):  Select: EMS provides history of encounter    EXTERNAL RECORDS REVIEWED  Select: None    PAST MEDICAL HISTORY  Past Medical History:   Diagnosis Date    Anxiety     Arthritis     osteo    Dental disorder 2016    upper denture    DJD (degenerative joint disease)     DVT (deep venous thrombosis) ([facility]) 2-2016    leg right    Gynecological disorder     Heart burn 2016    pain legs and hands; arthritis    Indigestion     Obesity     Restless leg syndrome      .    SURGICAL HISTORY  Past Surgical History:   Procedure Laterality Date    ANTERIOR AND POSTERIOR REPAIR  7/18/2017    Procedure: ANTERIOR AND POSTERIOR REPAIR ;  Surgeon: Dave Ellis M.D.;  Location: SURGERY SAME DAY [facility];  Service:     ENTEROCELE REPAIR  7/18/2017    Procedure:  ENTEROCELE REPAIR , PERINEOPLASTY ;  Surgeon: Dave Ellis M.D.;  Location: SURGERY SAME DAY [facility];  Service:     VAGINAL SUSPENSION  2017    Procedure: VAGINAL SUSPENSION- SACROSPINOUS VAULT ;  Surgeon: Dave Ellis M.D.;  Location: SURGERY SAME DAY [facility];  Service:     BLADDER SLING FEMALE  2017    Procedure: BLADDER SLING FEMALE TOT;  Surgeon: Dave Ellis M.D.;  Location: SURGERY SAME DAY [facility];  Service:     VAGINAL HYSTERECTOMY SCOPE TOTAL  1/3/2017    Procedure: VAGINAL HYSTERECTOMY SCOPE TOTAL WITH BSO;  Surgeon: Dave Ellis M.D.;  Location: SURGERY SAME DAY [facility];  Service:     ANTERIOR AND POSTERIOR REPAIR  1/3/2017    Procedure: ANTERIOR AND POSTERIOR REPAIR;  Surgeon: Dave Ellis M.D.;  Location: SURGERY SAME DAY [facility];  Service:     ENTEROCELE REPAIR  1/3/2017    Procedure: ENTEROCELE REPAIR;  Surgeon: Dave Ellis M.D.;  Location: SURGERY SAME DAY [facility];  Service:     VAGINAL SUSPENSION  1/3/2017    Procedure: VAGINAL SUSPENSION - SACROSPINOUS VAULT W/PERINEOPLASTY;  Surgeon: Dave Ellis M.D.;  Location: SURGERY SAME DAY [facility];  Service:     BLADDER SLING FEMALE  1/3/2017    Procedure: BLADDER SLING FEMALE - TOT;  Surgeon: Dave Ellis M.D.;  Location: SURGERY SAME DAY [facility];  Service:     CYSTOSCOPY  1/3/2017    Procedure: CYSTOSCOPY;  Surgeon: Dave Ellis M.D.;  Location: SURGERY SAME DAY [facility];  Service:     OTHER ABDOMINAL SURGERY      gallbladder surgery at age 20 yr         FAMILY HISTORY  No family history pertinent.       SOCIAL HISTORY  Social History     Tobacco Use    Smoking status: Former     Current packs/day: 0.00     Types: Cigarettes     Quit date: 1973     Years since quittin.3   Vaping Use    Vaping Use: Never used   Substance Use Topics    Alcohol use: No     Alcohol/week: 0.0 oz    Drug use: Yes     Types: Inhaled     Comment: marijuana occasionally  "        CURRENT MEDICATIONS  Current Outpatient Medications   Medication Instructions    ALPRAZolam (XANAX) 0.25 mg, Oral, 3 TIMES DAILY PRN    ARIPiprazole (ABILIFY) 2 mg, Oral, DAILY    DULoxetine (CYMBALTA) 20 mg, Oral, 2 TIMES DAILY    gabapentin (NEURONTIN) 200 mg, Oral, EVERY BEDTIME    metFORMIN ER (GLUCOPHAGE XR) 500 mg, Oral, 2 TIMES DAILY    pantoprazole (PROTONIX) 20 mg, Oral, DAILY    promethazine (PHENERGAN) 25 mg, Oral, EVERY 6 HOURS PRN    rivaroxaban (XARELTO) 20 mg, Oral, WITH PM MEAL    traMADol (ULTRAM)  mg, Oral, EVERY 4 HOURS PRN      ALLERGIES  Allergies   Allergen Reactions    Asa [Aspirin] Unspecified     GI DISCOMFORT    Pcn [Penicillins] Unspecified     GI DISCOMFORT       PHYSICAL EXAM  VITAL SIGNS:BP (!) 187/92   Pulse 76   Temp 36.4 °C (97.6 °F) (Temporal)   Resp 16   Ht 1.575 m (5' 2\")   Wt 120 kg (265 lb)   SpO2 99%   BMI 48.47 kg/m²       Constitutional: Disheveled appearance but otherwise well-developed mild distress   HENT: Normocephalic, Bilateral external ears normal.  Eyes:  Swelling around right eye, conjunctiva are normal.   Neck: Supple.  Nontender midline  Cardiovascular: Regular rate and rhythm without murmurs gallops or rubs.   Thorax & Lungs: No respiratory distress. Breathing comfortably. Lungs are clear to auscultation bilaterally, there are no wheezes no rales. Chest wall is nontender.  Abdomen: Soft, non distended, non tender   Skin: Warm, Dry, No erythema,   Back: Lumbar tenderness.  Extremities: Tenderness about the left hip and left femur.   Musculoskeletal: No clubbing cyanosis or edema good range of motion   Neurologic: Alert & oriented x 3, normal sensation moving all extremities appears normal   Psychiatric: Affect normal, Judgment normal, Mood normal.     DIAGNOSTIC STUDIES / PROCEDURES    LABS  Results for orders placed or performed during the hospital encounter of 04/07/24   Lactic Acid   Result Value Ref Range    Lactic Acid 2.2 (H) 0.5 - 2.0 " mmol/L   Lactic Acid   Result Value Ref Range    Lactic Acid 2.1 (H) 0.5 - 2.0 mmol/L   Lactic Acid   Result Value Ref Range    Lactic Acid 1.6 0.5 - 2.0 mmol/L   CBC with Differential   Result Value Ref Range    WBC 8.3 4.8 - 10.8 K/uL    RBC 4.15 (L) 4.20 - 5.40 M/uL    Hemoglobin 11.1 (L) 12.0 - 16.0 g/dL    Hematocrit 35.0 (L) 37.0 - 47.0 %    MCV 84.3 81.4 - 97.8 fL    MCH 26.7 (L) 27.0 - 33.0 pg    MCHC 31.7 (L) 32.2 - 35.5 g/dL    RDW 53.6 (H) 35.9 - 50.0 fL    Platelet Count 256 164 - 446 K/uL    MPV 10.1 9.0 - 12.9 fL    Neutrophils-Polys 75.70 (H) 44.00 - 72.00 %    Lymphocytes 14.60 (L) 22.00 - 41.00 %    Monocytes 8.70 0.00 - 13.40 %    Eosinophils 0.40 0.00 - 6.90 %    Basophils 0.40 0.00 - 1.80 %    Immature Granulocytes 0.20 0.00 - 0.90 %    Nucleated RBC 0.00 0.00 - 0.20 /100 WBC    Neutrophils (Absolute) 6.27 1.82 - 7.42 K/uL    Lymphs (Absolute) 1.21 1.00 - 4.80 K/uL    Monos (Absolute) 0.72 0.00 - 0.85 K/uL    Eos (Absolute) 0.03 0.00 - 0.51 K/uL    Baso (Absolute) 0.03 0.00 - 0.12 K/uL    Immature Granulocytes (abs) 0.02 0.00 - 0.11 K/uL    NRBC (Absolute) 0.00 K/uL   Complete Metabolic Panel   Result Value Ref Range    Sodium 140 135 - 145 mmol/L    Potassium 3.4 (L) 3.6 - 5.5 mmol/L    Chloride 103 96 - 112 mmol/L    Co2 24 20 - 33 mmol/L    Anion Gap 13.0 7.0 - 16.0    Glucose 104 (H) 65 - 99 mg/dL    Bun 9 8 - 22 mg/dL    Creatinine 0.40 (L) 0.50 - 1.40 mg/dL    Calcium 8.5 8.5 - 10.5 mg/dL    Correct Calcium 9.5 8.5 - 10.5 mg/dL    AST(SGOT) 52 (H) 12 - 45 U/L    ALT(SGPT) 14 2 - 50 U/L    Alkaline Phosphatase 146 (H) 30 - 99 U/L    Total Bilirubin 0.7 0.1 - 1.5 mg/dL    Albumin 2.8 (L) 3.2 - 4.9 g/dL    Total Protein 6.4 6.0 - 8.2 g/dL    Globulin 3.6 (H) 1.9 - 3.5 g/dL    A-G Ratio 0.8 g/dL   Urinalysis    Specimen: Urine   Result Value Ref Range    Color Yellow     Character Clear     Specific Gravity 1.007 <1.035    Ph 7.5 5.0 - 8.0    Glucose Negative Negative mg/dL    Ketones Negative  Negative mg/dL    Protein Negative Negative mg/dL    Bilirubin Negative Negative    Urobilinogen, Urine 0.2 Negative    Nitrite Negative Negative    Leukocyte Esterase Negative Negative    Occult Blood Negative Negative    Micro Urine Req see below    CREATINE KINASE   Result Value Ref Range    CPK Total 1182 (H) 0 - 154 U/L   ESTIMATED GFR   Result Value Ref Range    GFR (CKD-EPI) 106 >60 mL/min/1.73 m 2   EKG   Result Value Ref Range    Report       [facility] Emergency Dept.    Test Date:  2024  Pt Name:    CONRAD PABON                 Department: ER  MRN:        [id number]                      Room:       [facility]  Gender:     Female                       Technician: [id number]  :        [id number]                   Requested By:GRACIELA MARK  Order #:    [id number]                    Reading MD: GRACIELA MARK MD    Measurements  Intervals                                Axis  Rate:       91                           P:          0  WA:         0                            QRS:        -69  QRSD:       120                          T:          106  QT:         430  QTc:        530    Interpretive Statements  Atrial fibrillation  Incomplete RBBB and LAFB  Anteroseptal infarct, age indeterminate  Compared to ECG 07/10/2017 08:07:48  Incomplete right bundle-branch block now present  Right bundle-branch block now present  Sinus rhythm no longer present  First degree AV block no longer present  Myocardial infarct  finding still present  Electronically Signed On 2024 18:47:12 PDT by GRACIELA MARK MD         EKG:   I have independently interpreted this EKG as detailed above.      RADIOLOGY  I have independently interpreted the diagnostic imaging associated with this visit and am waiting the final reading from the radiologist.   My preliminary interpretation is as follows: Chest x-ray shows no infiltrates      Radiologist interpretation:   CT-LSPINE W/O PLUS RECONS   Final Result      1.   There is a transitional segment at the lumbosacral junction. For the purposes of this report the transitional segment is being defined as the L5 level which is sacralized. By this counting the L1 vertebral body is the first nonrib-bearing vertebral    body and there are rudimentary small ribs at T12. If any intervention is considered or planned, need to take into account the presence of transitional segment anatomy to ensure appropriate level therapy.   2.  Acute compression fractures of L2 and L4.   3.  Chronic compression fracture L3.   4.  Degenerative changes with multilevel central canal and neural foraminal stenoses. This is most severe at the L3-4 level where there is severe degenerative central canal stenosis.      DX-FEMUR-2+ RIGHT   Final Result      No acute fracture detected.      CT-MAXILLOFACIAL W/O PLUS RECONS   Final Result            1. No acute maxillofacial fracture.      CT-HEAD W/O   Final Result         1. No acute intracranial abnormality. No evidence of acute intracranial hemorrhage or mass lesion.                     DX-CHEST-PORTABLE (1 VIEW)   Final Result         1. Low lung volume with hypoventilatory change and bibasilar atelectasis.      EC-ECHOCARDIOGRAM COMPLETE W/O CONT    (Results Pending)        COURSE & MEDICAL DECISION MAKING    ED COURSE:    ED Observation Status? No, The patient does not qualify for observation status    INTERVENTIONS BY ME:  Medications   morphine 4 MG/ML injection 2 mg (has no administration in time range)   lactated ringers (LR) bolus (0 mL Intravenous Stopped 4/7/24 1708)       2:06 PM - Patient seen and examined at charge desk for a TBI. Discussed plan of care, including labs and imaging. Patient agrees to the plan of care. The patient will be medicated with 4 mg morphine. Ordered for CT-maxillofacial, DX-Chest, CT- Head w/o, lactic acid, CBC with diff, CMP, urine culture, UA, blood culture x2, and EKG to evaluate her symptoms.     5:19 PM - Patient was  reevaluated at bedside. The patient informs me that she has right sided abdominal pain and pain in her right femur that has been there for about 3-4 weeks. She explains that she uses trazodone at home. She notes that she has had several falls at home. I inform her that she is dehydrated. She will be given LR ringer and 4 mg morphine. I ordered for DX Femur right.    6:30 PM - Patient was reevaluated at bedside. Patient's pain is improved.     7:05 PM I discussed the patient's case and the above findings with Dr. Mueller (Hospitalist) who agrees to evaluate the patient for hospitalization.     7:37 PM Spoke with Dr. Porras, Neurosurgery, about the patient's condition. He recommends a brace and a follow-up in clinic.    INITIAL ASSESSMENT, COURSE AND PLAN  Care Narrative: Patient presents emerged part for evaluation.  Clinically the patient had been on the floor for approximately the whole evening and night.  The concerns for rhabdomyolysis is there is so I started the patient with a liter bolus of lactated Ringer's.  She is currently on blood thinners there is signs of trauma around her face so I did do a CT scan of the head as well as maxillofacial region there is no signs of fractures.  Laboratory studies do show an elevated CPK level consistent with mild rhabdomyolysis.  Renal functions are otherwise normal.  The patient's urine is otherwise normal.  I started the patient with a second liter of fluids just to help with the mild rhabdomyolysis but she was complaining of continued pain to her femur that is chronic in nature but also her back which shows acute compression fractures of L2 and L4.  I did briefly speak with Dr. Porras who recommended a TLSO brace and follow-up as an outpatient.  The patient however at this time is unable to ambulate and unable to move so I do feel the patient will most likely require admission for further hydration of her mild rhabdomyolysis pain control for her compression  fractures and most likely a skilled nursing facility for rehab.  Have spoken to the hospitalist for this admission.    HYDRATION: Based on the patient's presentation of dehydration,  the patient was given IV fluids. IV Hydration was used because oral hydration is unable to be done due to the patient's symptoms. Upon recheck following hydration, the patient was feeling improved.     ADDITIONAL PROBLEM LIST  Generalized weakness    DISPOSITION AND DISCUSSIONS  I have discussed management of the patient with the following physicians/ ANJUM's/ ancillary staff:  Dr. Porras (Neurosurgery), Dr. Mueller (Hospitalist)    Barriers to care at this time, including but not limited to:  None .         DISPOSITION:  Patient will be hospitalized by Dr. Mueller in guarded condition.     FINAL DIAGNOSIS  1. Fall from ground level    2. Weakness    3. Non-traumatic rhabdomyolysis    4. Dehydration    5. Compression fracture of L2 vertebra, initial encounter (HCC)    6. Compression fracture of L4 lumbar vertebra, closed, initial encounter (HCC)    7. New onset atrial fibrillation (HCC)         I, Vandana Pena (Liz), am scribing for, and in the presence of, No att. providers found.    Electronically signed by: Vandana Pena (Fadiaibpriyank), 4/7/2024    I, No att. providers found personally performed the services described in this documentation, as scribed by Vandana Pena in my presence, and it is both accurate and complete.     Electronically signed by: Lm Kelley M.D.,10:07 PM 04/07/24

## 2024-04-08 ENCOUNTER — HOSPITAL ENCOUNTER (INPATIENT)
Facility: REHABILITATION | Age: 69
End: 2024-04-08
Attending: PHYSICAL MEDICINE & REHABILITATION | Admitting: PHYSICAL MEDICINE & REHABILITATION
Payer: MEDICARE

## 2024-04-08 ENCOUNTER — APPOINTMENT (OUTPATIENT)
Dept: CARDIOLOGY | Facility: MEDICAL CENTER | Age: 69
End: 2024-04-08
Attending: INTERNAL MEDICINE
Payer: MEDICARE

## 2024-04-08 PROBLEM — G93.40 ENCEPHALOPATHY: Status: RESOLVED | Noted: 2024-04-07 | Resolved: 2024-04-08

## 2024-04-08 PROBLEM — R74.8 ELEVATED CPK: Status: ACTIVE | Noted: 2024-04-08

## 2024-04-08 PROBLEM — K21.9 GERD (GASTROESOPHAGEAL REFLUX DISEASE): Status: ACTIVE | Noted: 2024-04-08

## 2024-04-08 PROBLEM — F39 MOOD DISORDER (HCC): Status: ACTIVE | Noted: 2024-04-08

## 2024-04-08 PROBLEM — M62.82 RHABDOMYOLYSIS: Status: RESOLVED | Noted: 2024-04-07 | Resolved: 2024-04-08

## 2024-04-08 LAB
ALBUMIN SERPL BCP-MCNC: 2.3 G/DL (ref 3.2–4.9)
ALBUMIN/GLOB SERPL: 0.8 G/DL
ALP SERPL-CCNC: 121 U/L (ref 30–99)
ALT SERPL-CCNC: 13 U/L (ref 2–50)
AMMONIA PLAS-SCNC: 43 UMOL/L (ref 11–45)
ANION GAP SERPL CALC-SCNC: 7 MMOL/L (ref 7–16)
AST SERPL-CCNC: 34 U/L (ref 12–45)
BASOPHILS # BLD AUTO: 0.5 % (ref 0–1.8)
BASOPHILS # BLD: 0.03 K/UL (ref 0–0.12)
BILIRUB SERPL-MCNC: 0.5 MG/DL (ref 0.1–1.5)
BUN SERPL-MCNC: 8 MG/DL (ref 8–22)
CALCIUM ALBUM COR SERPL-MCNC: 9.3 MG/DL (ref 8.5–10.5)
CALCIUM SERPL-MCNC: 7.9 MG/DL (ref 8.5–10.5)
CHLORIDE SERPL-SCNC: 107 MMOL/L (ref 96–112)
CK SERPL-CCNC: 800 U/L (ref 0–154)
CO2 SERPL-SCNC: 29 MMOL/L (ref 20–33)
CREAT SERPL-MCNC: 0.39 MG/DL (ref 0.5–1.4)
EOSINOPHIL # BLD AUTO: 0.1 K/UL (ref 0–0.51)
EOSINOPHIL NFR BLD: 1.6 % (ref 0–6.9)
ERYTHROCYTE [DISTWIDTH] IN BLOOD BY AUTOMATED COUNT: 53.1 FL (ref 35.9–50)
GFR SERPLBLD CREATININE-BSD FMLA CKD-EPI: 107 ML/MIN/1.73 M 2
GLOBULIN SER CALC-MCNC: 2.9 G/DL (ref 1.9–3.5)
GLUCOSE SERPL-MCNC: 116 MG/DL (ref 65–99)
HCT VFR BLD AUTO: 31 % (ref 37–47)
HGB BLD-MCNC: 9.7 G/DL (ref 12–16)
IMM GRANULOCYTES # BLD AUTO: 0.02 K/UL (ref 0–0.11)
IMM GRANULOCYTES NFR BLD AUTO: 0.3 % (ref 0–0.9)
LV EJECT FRACT  99904: 60
LV EJECT FRACT MOD 2C 99903: 57.32
LV EJECT FRACT MOD 4C 99902: 67.77
LV EJECT FRACT MOD BP 99901: 62.68
LYMPHOCYTES # BLD AUTO: 1.42 K/UL (ref 1–4.8)
LYMPHOCYTES NFR BLD: 23.4 % (ref 22–41)
MCH RBC QN AUTO: 26.1 PG (ref 27–33)
MCHC RBC AUTO-ENTMCNC: 31.3 G/DL (ref 32.2–35.5)
MCV RBC AUTO: 83.6 FL (ref 81.4–97.8)
MONOCYTES # BLD AUTO: 0.6 K/UL (ref 0–0.85)
MONOCYTES NFR BLD AUTO: 9.9 % (ref 0–13.4)
NEUTROPHILS # BLD AUTO: 3.91 K/UL (ref 1.82–7.42)
NEUTROPHILS NFR BLD: 64.3 % (ref 44–72)
NRBC # BLD AUTO: 0 K/UL
NRBC BLD-RTO: 0 /100 WBC (ref 0–0.2)
PLATELET # BLD AUTO: 198 K/UL (ref 164–446)
PMV BLD AUTO: 9.8 FL (ref 9–12.9)
POTASSIUM SERPL-SCNC: 3.1 MMOL/L (ref 3.6–5.5)
PROT SERPL-MCNC: 5.2 G/DL (ref 6–8.2)
RBC # BLD AUTO: 3.71 M/UL (ref 4.2–5.4)
SODIUM SERPL-SCNC: 143 MMOL/L (ref 135–145)
TSH SERPL DL<=0.005 MIU/L-ACNC: 0.59 UIU/ML (ref 0.38–5.33)
VIT B12 SERPL-MCNC: 522 PG/ML (ref 211–911)
WBC # BLD AUTO: 6.1 K/UL (ref 4.8–10.8)

## 2024-04-08 PROCEDURE — 80053 COMPREHEN METABOLIC PANEL: CPT

## 2024-04-08 PROCEDURE — 700102 HCHG RX REV CODE 250 W/ 637 OVERRIDE(OP)

## 2024-04-08 PROCEDURE — 82550 ASSAY OF CK (CPK): CPT

## 2024-04-08 PROCEDURE — 700105 HCHG RX REV CODE 258: Performed by: INTERNAL MEDICINE

## 2024-04-08 PROCEDURE — 85025 COMPLETE CBC W/AUTO DIFF WBC: CPT

## 2024-04-08 PROCEDURE — A9270 NON-COVERED ITEM OR SERVICE: HCPCS

## 2024-04-08 PROCEDURE — 97166 OT EVAL MOD COMPLEX 45 MIN: CPT

## 2024-04-08 PROCEDURE — 93306 TTE W/DOPPLER COMPLETE: CPT | Mod: 26 | Performed by: INTERNAL MEDICINE

## 2024-04-08 PROCEDURE — 36415 COLL VENOUS BLD VENIPUNCTURE: CPT

## 2024-04-08 PROCEDURE — 99205 OFFICE O/P NEW HI 60 MIN: CPT | Mod: 25 | Performed by: PHYSICAL MEDICINE & REHABILITATION

## 2024-04-08 PROCEDURE — 93306 TTE W/DOPPLER COMPLETE: CPT

## 2024-04-08 PROCEDURE — 700102 HCHG RX REV CODE 250 W/ 637 OVERRIDE(OP): Performed by: INTERNAL MEDICINE

## 2024-04-08 PROCEDURE — 99232 SBSQ HOSP IP/OBS MODERATE 35: CPT | Mod: GC | Performed by: INTERNAL MEDICINE

## 2024-04-08 PROCEDURE — 700101 HCHG RX REV CODE 250: Performed by: INTERNAL MEDICINE

## 2024-04-08 PROCEDURE — 82140 ASSAY OF AMMONIA: CPT

## 2024-04-08 PROCEDURE — 99999 PR NO CHARGE: CPT | Performed by: NURSE PRACTITIONER

## 2024-04-08 PROCEDURE — A9270 NON-COVERED ITEM OR SERVICE: HCPCS | Mod: JZ

## 2024-04-08 PROCEDURE — G2212 PROLONG OUTPT/OFFICE VIS: HCPCS | Performed by: PHYSICAL MEDICINE & REHABILITATION

## 2024-04-08 PROCEDURE — 84443 ASSAY THYROID STIM HORMONE: CPT

## 2024-04-08 PROCEDURE — 700102 HCHG RX REV CODE 250 W/ 637 OVERRIDE(OP): Mod: JZ

## 2024-04-08 PROCEDURE — 770020 HCHG ROOM/CARE - TELE (206)

## 2024-04-08 PROCEDURE — 97163 PT EVAL HIGH COMPLEX 45 MIN: CPT

## 2024-04-08 PROCEDURE — 700117 HCHG RX CONTRAST REV CODE 255

## 2024-04-08 PROCEDURE — A9270 NON-COVERED ITEM OR SERVICE: HCPCS | Performed by: INTERNAL MEDICINE

## 2024-04-08 PROCEDURE — 82607 VITAMIN B-12: CPT

## 2024-04-08 RX ORDER — NYSTATIN 100000 [USP'U]/G
POWDER TOPICAL 2 TIMES DAILY
Status: DISCONTINUED | OUTPATIENT
Start: 2024-04-08 | End: 2024-04-12 | Stop reason: HOSPADM

## 2024-04-08 RX ORDER — POTASSIUM CHLORIDE 20 MEQ/1
20 TABLET, EXTENDED RELEASE ORAL ONCE
Status: COMPLETED | OUTPATIENT
Start: 2024-04-08 | End: 2024-04-08

## 2024-04-08 RX ORDER — POTASSIUM CHLORIDE 20 MEQ/1
40 TABLET, EXTENDED RELEASE ORAL ONCE
Status: COMPLETED | OUTPATIENT
Start: 2024-04-08 | End: 2024-04-08

## 2024-04-08 RX ADMIN — SODIUM CHLORIDE: 9 INJECTION, SOLUTION INTRAVENOUS at 06:08

## 2024-04-08 RX ADMIN — NYSTATIN: 100000 POWDER TOPICAL at 21:13

## 2024-04-08 RX ADMIN — POTASSIUM CHLORIDE 20 MEQ: 1500 TABLET, EXTENDED RELEASE ORAL at 09:23

## 2024-04-08 RX ADMIN — OMEPRAZOLE 20 MG: 20 CAPSULE, DELAYED RELEASE ORAL at 06:04

## 2024-04-08 RX ADMIN — HUMAN ALBUMIN MICROSPHERES AND PERFLUTREN 3 ML: 10; .22 INJECTION, SOLUTION INTRAVENOUS at 16:30

## 2024-04-08 RX ADMIN — LIDOCAINE 1 PATCH: 4 PATCH TOPICAL at 21:18

## 2024-04-08 RX ADMIN — RIVAROXABAN 20 MG: 20 TABLET, FILM COATED ORAL at 21:12

## 2024-04-08 RX ADMIN — POTASSIUM CHLORIDE 40 MEQ: 1500 TABLET, EXTENDED RELEASE ORAL at 02:23

## 2024-04-08 RX ADMIN — OXYCODONE 5 MG: 5 TABLET ORAL at 13:36

## 2024-04-08 RX ADMIN — DULOXETINE HYDROCHLORIDE 20 MG: 20 CAPSULE, DELAYED RELEASE ORAL at 06:04

## 2024-04-08 RX ADMIN — DULOXETINE HYDROCHLORIDE 20 MG: 20 CAPSULE, DELAYED RELEASE ORAL at 16:48

## 2024-04-08 RX ADMIN — OXYCODONE HYDROCHLORIDE 10 MG: 10 TABLET ORAL at 21:17

## 2024-04-08 ASSESSMENT — ENCOUNTER SYMPTOMS
RESPIRATORY NEGATIVE: 1
BACK PAIN: 1
FALLS: 1
PSYCHIATRIC NEGATIVE: 1
NEUROLOGICAL NEGATIVE: 1
PALPITATIONS: 0
WEAKNESS: 1
EYES NEGATIVE: 1
CONSTITUTIONAL NEGATIVE: 1
GASTROINTESTINAL NEGATIVE: 1
MYALGIAS: 1

## 2024-04-08 ASSESSMENT — COGNITIVE AND FUNCTIONAL STATUS - GENERAL
CLIMB 3 TO 5 STEPS WITH RAILING: TOTAL
SUGGESTED CMS G CODE MODIFIER DAILY ACTIVITY: CK
HELP NEEDED FOR BATHING: A LOT
TOILETING: A LOT
DRESSING REGULAR LOWER BODY CLOTHING: A LOT
WALKING IN HOSPITAL ROOM: TOTAL
TURNING FROM BACK TO SIDE WHILE IN FLAT BAD: A LOT
MOVING TO AND FROM BED TO CHAIR: TOTAL
SUGGESTED CMS G CODE MODIFIER MOBILITY: CM
MOVING FROM LYING ON BACK TO SITTING ON SIDE OF FLAT BED: A LOT
STANDING UP FROM CHAIR USING ARMS: TOTAL
PERSONAL GROOMING: A LITTLE
EATING MEALS: A LITTLE
DAILY ACTIVITIY SCORE: 14
DRESSING REGULAR UPPER BODY CLOTHING: A LOT
MOBILITY SCORE: 8

## 2024-04-08 ASSESSMENT — PAIN DESCRIPTION - PAIN TYPE
TYPE: CHRONIC PAIN;ACUTE PAIN
TYPE: ACUTE PAIN;CHRONIC PAIN
TYPE: ACUTE PAIN

## 2024-04-08 ASSESSMENT — GAIT ASSESSMENTS: GAIT LEVEL OF ASSIST: UNABLE TO PARTICIPATE

## 2024-04-08 ASSESSMENT — ACTIVITIES OF DAILY LIVING (ADL): TOILETING: INDEPENDENT

## 2024-04-08 ASSESSMENT — FIBROSIS 4 INDEX: FIB4 SCORE: 3.33

## 2024-04-08 NOTE — THERAPY
Physical Therapy   Initial Evaluation     Patient Name: Nieves Murphy  Age:  69 y.o., Sex:  female  Medical Record #: [id number]  Today's Date: 4/8/2024     Precautions  Precautions: Fall Risk  Comments: BMI 52.42    Assessment  Patient is a 69 y.o. female with a diagnosis of acute compression fractures of L2 and L4. chronic compression fracture L3. Pt resides alone, uses custom power chair for mobility in home, ACCESS bus for community,  caregiver from [facility] M- [facility] am.      PMHx  of left leg DVT, on Xarelto, type 2 diabetes, restless leg syndrome, obesity, anxiety, GERD ,left hip fx one yr post.    Pt seen for PT mobility assessment. Pt had minimal back pain during assessment but does c/o right hip discomfort. Pt agreeable to EOB.    Pt EOB with assistance for BLE off bed. Pt able to maintain balance at EOB w/o assist for 18 min. Pt performed BLE exercises and attempted donning TLSO. TLSO with poor fit due to waist circumference.    Pt unable to stand due to bed height, recommend platform or stanislaw steady next session.  Pt's main goals is to be able to transfer self in and out of her power chair. Recommend continued PT while in house.         Plan    Physical Therapy Initial Treatment Plan   Treatment Plan : (P) Bed Mobility, Gait Training, Manual Therapy, Neuro Re-Education / Balance, Self Care / Home Evaluation, Therapeutic Activities, Therapeutic Exercise  Treatment Frequency: (P) 4 Times per Week  Duration: (P) Until Therapy Goals Met    DC Equipment Recommendations: (P) Unable to determine at this time  Discharge Recommendations: (P)  (Recommend PM& R consult for IRF.)            Initial Contact Note    Initial Contact Note Order Received and Verified, Physical Therapy Evaluation in Progress with Full Report to Follow.   Precautions   Precautions Fall Risk   Comments BMI 52.42   Vitals   O2 (LPM) 1   O2 Delivery Device Silicone Nasal Cannula   Pain 0 - 10 Group   Location Hip   Location Orientation Left    Description Aching  (h/o left hip fx 2 years post)   Therapist Pain Assessment Post Activity Pain Same as Prior to Activity;5   Prior Living Situation   Prior Services Intermittent Physical Support for ADL Per Service;Housekeeping / Homemaker Services  ([employer])   Housing / Facility 3 Story Apartment / Condo   Steps Into Home 0   Steps In Home 0   Elevator Yes   Equipment Owned Front-Wheel Walker;Single Point Cane;Power Wheel Chair;Reacher   Lives with - Patient's Self Care Capacity Alone and Able to Care For Self   Comments pt reports recent intentional weigh loss. ( 100 lbs) Pt states she may be weaker now which has contributed to falls. Pt also has OA to bilateral knees.   Prior Level of Functional Mobility   Bed Mobility Independent   Transfer Status Independent   Wheelchair Independent   Comments pt states she was IND with transfers in and out of her power chair.    History of Falls   History of Falls Yes   Date of Last Fall 04/07/24  (report prior fall when power chair went off curb at [place] on 1/24.)   Cognition    Level of Consciousness Alert   Comments Very pleasant and cooperative.   Active ROM Lower Body    Active ROM Lower Body  X   Gross Active ROM Gross Active Range of Motion Impaired,  but Appears Adequate for Functional Mobility.   Comments hip flexion limited by tissue approximation. limited forward flexion due to tissue approximation.   Strength Lower Body   Lower Body Strength  X   Gross Strength Generalized Weakness, Equal Bilaterally   Sensation Lower Body   Lower Extremity Sensation   WDL   Vision   Vision Comments no c/o visual changes.   Other Treatments   Other Treatments Provided Attempted jossue FARIA, Unable to properly fit due to abdominal girth.   Balance Assessment   Sitting Balance (Static) Good   Sitting Balance (Dynamic) Fair +   Weight Shift Sitting Fair   Bed Mobility    Supine to Sit Moderate Assist   Sit to Supine Moderate Assist   Rolling Minimum Assist to Lt.  (with  rail)   Gait Analysis   Gait Level Of Assist Unable to Participate   Comments unable to safely attempt sit to stand due to bed height. Pt is 5ft 2inches.   Functional Mobility   Sit to Stand Unable to Participate   Comments recommend trail of Stanislaw Steady for sit to stands next session.   6 Clicks Assessment - How much HELP from another person do you currently need... (If the patient hasn't done an activity recently, how much help from another person do you think he/she would need if he/she tried?)   Turning from your back to your side while in a flat bed without using bedrails? 2   Moving from lying on your back to sitting on the side of a flat bed without using bedrails? 2   Moving to and from a bed to a chair (including a wheelchair)? 1   Standing up from a chair using your arms (e.g., wheelchair, or bedside chair)? 1   Walking in hospital room? 1   Climbing 3-5 steps with a railing? 1   6 clicks Mobility Score 8   Activity Tolerance   Sitting Edge of Bed 18 min   Standing unable to test   Short Term Goals    Short Term Goal # 1 Pt will roll left <> right with use of rail at S level by tx 6   Short Term Goal # 2 Pt will perform side <> sit in bed with S by tx 6   Short Term Goal # 3 Pt will perform sit to stand from bed to stanislaw steady with Min A by tx 2   Short Term Goal # 4 Pt will perform sit to stand from chair to FWW at Min A level by tx 6   Short Term Goal # 5 pt will perform BLE exercises in sitting and supine x 10 reps with S by tx 2   Education Group   Education Provided Role of Physical Therapist   Role of Physical Therapist Patient Response Patient;Acceptance;Explanation;Verbal Demonstration   Physical Therapy Initial Treatment Plan    Treatment Plan  Bed Mobility;Gait Training;Manual Therapy;Neuro Re-Education / Balance;Self Care / Home Evaluation;Therapeutic Activities;Therapeutic Exercise   Treatment Frequency 4 Times per Week   Duration Until Therapy Goals Met   Problem List    Problems Impaired Bed  Mobility;Impaired Transfers;Functional Strength Deficit;Impaired Balance;Decreased Activity Tolerance   Anticipated Discharge Equipment and Recommendations   DC Equipment Recommendations Unable to determine at this time   Discharge Recommendations   (Recommend PM& R consult for IRF.)   Interdisciplinary Plan of Care Collaboration   IDT Collaboration with  Nursing;Physician   Patient Position at End of Therapy In Bed;Call Light within Reach;Tray Table within Reach;Phone within Reach   Collaboration Comments RN and MD updated on findings. Pt highly motivated. Suspect recent weight loss contributed to weakness and falls.   Session Information   Date / Session Number  4/8-1 ( 1/4, 4/14)

## 2024-04-08 NOTE — ASSESSMENT & PLAN NOTE
Patient appeared slow and disoriented in time.  Her baseline is unclear.  No gross motor deficit on exam.  CT head without contrast was negative  Suspicion for polypharmacy  Will hold gabapentin, hold Abilify due to rhabdomyolysis  Check bladder scan, TSH, vitamin B12, ammonia  Fall precautions  PT OT evaluation

## 2024-04-08 NOTE — DISCHARGE PLANNING
Maynor Del Valle left a message, pt will need to meet the three midnight requirement with [employer] insurance.

## 2024-04-08 NOTE — CONSULTS
Physical Medicine and Rehabilitation Consultation          Date of initial consultation: 4/8/2024  Consulting provider: Elie Her M.D.   Reason for consultation: assess for acute inpatient rehab appropriateness  LOS: 1 Day(s)    Chief complaint: Compression fractures    HPI: The patient is a 69 y.o. right hand dominant female with a past medical history of LLE DVT on Xarelto, diabetes mellitus type 2 on metformin;  who presented on 4/7/2024  2:03 PM following ground-level fall, and found to have new onset atrial fibrillation.  Patient has a low functional baseline, uses wheelchair for mobility due to chronic weakness, and was transferring to recliner when she fell and remained down for 20 hours.  Patient was found by friends who activated EMS patient was brought to the ED where workup found elevated CPK of 1182, lactic acid 2.2, and CT L-spine found L2 and L4 compression fractures.  Patient was assessed by Dr. Edin Porras MD neurosurgery who recommended conservative management with TLSO when OOB.  Patient is being treated with pain control and PT.     The patient currently reports back pain.  Patient is on half a liter of oxygen and desaturates to 85 to 88% off of oxygen.  Patient reports using a power wheelchair at baseline.  She is independent with her power wheelchair and transfers with stand pivot transfers.  She lives alone.     ROS  Pertinent positives are mentioned in the HPI, all others reviewed and are negative.    Social Hx:  1 SH  0 COLIN  With: Alone.  Patient lives in a third floor apartment with elevator access.  He has support from housekeeping and caregiver services Monday through Thursday mornings through [facility].  Patient uses a power wheelchair for mobility, access past for transportation.  He is typically able to transfer himself and is independent with his DME at home.    THERAPY:  Restrictions: Fall risk  PT: Functional mobility   4/8: Unable to participate in gait or sit to  stand    OT: ADLs  4/8: Max assist upper body dressing and lower body dressing    SLP:   None    IMAGING:  CT L-spine 4/7/2024    1.  There is a transitional segment at the lumbosacral junction. For the purposes of this report the transitional segment is being defined as the L5 level which is sacralized. By this counting the L1 vertebral body is the first nonrib-bearing vertebral body and there are rudimentary small ribs at T12. If any intervention is considered or planned, need to take into account the presence of transitional segment anatomy to ensure appropriate level therapy.  2.  Acute compression fractures of L2 and L4.  3.  Chronic compression fracture L3.  4.  Degenerative changes with multilevel central canal and neural foraminal stenoses. This is most severe at the L3-4 level where there is severe degenerative central canal stenosis.    PROCEDURES:  None    PMH:  Past Medical History:   Diagnosis Date    Anxiety     Arthritis     osteo    Dental disorder 2016    upper denture    DJD (degenerative joint disease)     DVT (deep venous thrombosis) ([facility]) 2-2016    leg right    Gynecological disorder     Heart burn 2016    pain legs and hands; arthritis    Indigestion     Obesity     Restless leg syndrome        PSH:  Past Surgical History:   Procedure Laterality Date    ANTERIOR AND POSTERIOR REPAIR  7/18/2017    Procedure: ANTERIOR AND POSTERIOR REPAIR ;  Surgeon: Dave Ellis M.D.;  Location: SURGERY SAME DAY [facility] ORS;  Service:     ENTEROCELE REPAIR  7/18/2017    Procedure: ENTEROCELE REPAIR , PERINEOPLASTY ;  Surgeon: Dave Ellis M.D.;  Location: SURGERY SAME DAY [facility] OR[facility];  Service:     VAGINAL SUSPENSION  7/18/2017    Procedure: VAGINAL SUSPENSION- SACROSPINOUS VAULT ;  Surgeon: Dave Ellis M.D.;  Location: SURGERY SAME DAY [facility] OR[facility];  Service:     BLADDER SLING FEMALE  7/18/2017    Procedure: BLADDER SLING FEMALE TOT;  Surgeon: Dave Ellis M.D.;  Location: SURGERY SAME DAY  [facility] ORS;  Service:     VAGINAL HYSTERECTOMY SCOPE TOTAL  1/3/2017    Procedure: VAGINAL HYSTERECTOMY SCOPE TOTAL WITH BSO;  Surgeon: Daev Ellis M.D.;  Location: SURGERY SAME DAY [facility];  Service:     ANTERIOR AND POSTERIOR REPAIR  1/3/2017    Procedure: ANTERIOR AND POSTERIOR REPAIR;  Surgeon: Dave Ellis M.D.;  Location: SURGERY SAME DAY [facility];  Service:     ENTEROCELE REPAIR  1/3/2017    Procedure: ENTEROCELE REPAIR;  Surgeon: Dave Ellis M.D.;  Location: SURGERY SAME DAY [facility];  Service:     VAGINAL SUSPENSION  1/3/2017    Procedure: VAGINAL SUSPENSION - SACROSPINOUS VAULT W/PERINEOPLASTY;  Surgeon: Dave Ellis M.D.;  Location: SURGERY SAME DAY [facility];  Service:     BLADDER SLING FEMALE  1/3/2017    Procedure: BLADDER SLING FEMALE - TOT;  Surgeon: Dave Ellis M.D.;  Location: SURGERY SAME DAY [facility];  Service:     CYSTOSCOPY  1/3/2017    Procedure: CYSTOSCOPY;  Surgeon: Dave Ellis M.D.;  Location: SURGERY SAME DAY [facility];  Service:     OTHER ABDOMINAL SURGERY      gallbladder surgery at age 20 yr       FHX:  Non-pertinent to today's issues    Medications:  Current Facility-Administered Medications   Medication Dose    acetaminophen (Tylenol) tablet 650 mg  650 mg    Pharmacy Consult Request ...Pain Management Review 1 Each  1 Each    oxyCODONE immediate-release (Roxicodone) tablet 5 mg  5 mg    Or    oxyCODONE immediate release (Roxicodone) tablet 10 mg  10 mg    Or    morphine 4 MG/ML injection 4 mg  4 mg    senna-docusate (Pericolace Or Senokot S) 8.6-50 MG per tablet 2 Tablet  2 Tablet    And    polyethylene glycol/lytes (Miralax) Packet 1 Packet  1 Packet    Metoprolol Tartrate (Lopressor) injection 5 mg  5 mg    ondansetron (Zofran) syringe/vial injection 4 mg  4 mg    ondansetron (Zofran ODT) dispertab 4 mg  4 mg    lidocaine (Asperflex) 4 % patch 1 Patch  1 Patch    DULoxetine (Cymbalta) capsule 20 mg  20 mg    omeprazole (PriLOSEC)  "capsule 20 mg  20 mg    rivaroxaban (Xarelto) tablet 20 mg  20 mg    metoprolol SR (Toprol XL) tablet 25 mg  25 mg       Allergies:  Allergies   Allergen Reactions    Asa [Aspirin] Unspecified     GI DISCOMFORT    Pcn [Penicillins] Unspecified     GI DISCOMFORT         Physical Exam:  Vitals: BP (!) 147/65   Pulse 75   Temp 36.5 °C (97.7 °F) (Temporal)   Resp 18   Ht 1.575 m (5' 2\")   Wt (!) 130 kg (286 lb 9.6 oz)   SpO2 95%   Gen: NAD  Head: NC/AT  Eyes/ Nose/ Mouth: PERRLA, moist mucous membranes  Cardio: RRR, good distal perfusion, warm extremities  Pulm: normal respiratory effort, no cyanosis   Abd: Soft NTND, negative borborygmi   Ext: Very large bilateral lower extremities, potentially more swelling on the right than left.  Minimal edema 1-2+ bilaterally    Mental status:  A&Ox4 (person, place, date, situation) answers questions appropriately follows commands  Speech: fluent, no aphasia or dysarthria      Motor:      Upper Extremity  Myotome R L   Shoulder flexion C5 4/5 4/5   Elbow flexion C5 4/5 4/5   Wrist extension C6 4/5 4/5   Elbow extension C7 4/5 4/5   Finger flexion C8 4/5 4/5   Finger abduction T1 4/5 4/5     Lower Extremity Myotome R L   Hip flexion L2 3/5 4/5   Knee extension L3 3/5 5   Ankle dorsiflexion L4 4/5 4/5   Toe extension L5 4/5 4/5   Ankle plantarflexion S1 4/5 4/5     Sensory:   intact to light touch through out    Labs: Reviewed and significant for   Recent Labs     04/07/24  1426 04/08/24  0128   RBC 4.15* 3.71*   HEMOGLOBIN 11.1* 9.7*   HEMATOCRIT 35.0* 31.0*   PLATELETCT 256 198     Recent Labs     04/07/24  1426 04/08/24  0128   SODIUM 140 143   POTASSIUM 3.4* 3.1*   CHLORIDE 103 107   CO2 24 29   GLUCOSE 104* 116*   BUN 9 8   CREATININE 0.40* 0.39*   CALCIUM 8.5 7.9*     Recent Results (from the past 24 hour(s))   Blood Culture - Draw one from central line and one from peripheral site    Collection Time: 04/07/24  2:20 PM    Specimen: Line; Blood   Result Value Ref Range    " Significant Indicator NEG     Source BLD     Site Peripheral     Culture Result       No Growth  Note: Blood cultures are incubated for 5 days and  are monitored continuously.Positive blood cultures  are called to the RN and reported as soon as  they are identified.     Lactic Acid    Collection Time: 04/07/24  2:26 PM   Result Value Ref Range    Lactic Acid 2.2 (H) 0.5 - 2.0 mmol/L   CBC with Differential    Collection Time: 04/07/24  2:26 PM   Result Value Ref Range    WBC 8.3 4.8 - 10.8 K/uL    RBC 4.15 (L) 4.20 - 5.40 M/uL    Hemoglobin 11.1 (L) 12.0 - 16.0 g/dL    Hematocrit 35.0 (L) 37.0 - 47.0 %    MCV 84.3 81.4 - 97.8 fL    MCH 26.7 (L) 27.0 - 33.0 pg    MCHC 31.7 (L) 32.2 - 35.5 g/dL    RDW 53.6 (H) 35.9 - 50.0 fL    Platelet Count 256 164 - 446 K/uL    MPV 10.1 9.0 - 12.9 fL    Neutrophils-Polys 75.70 (H) 44.00 - 72.00 %    Lymphocytes 14.60 (L) 22.00 - 41.00 %    Monocytes 8.70 0.00 - 13.40 %    Eosinophils 0.40 0.00 - 6.90 %    Basophils 0.40 0.00 - 1.80 %    Immature Granulocytes 0.20 0.00 - 0.90 %    Nucleated RBC 0.00 0.00 - 0.20 /100 WBC    Neutrophils (Absolute) 6.27 1.82 - 7.42 K/uL    Lymphs (Absolute) 1.21 1.00 - 4.80 K/uL    Monos (Absolute) 0.72 0.00 - 0.85 K/uL    Eos (Absolute) 0.03 0.00 - 0.51 K/uL    Baso (Absolute) 0.03 0.00 - 0.12 K/uL    Immature Granulocytes (abs) 0.02 0.00 - 0.11 K/uL    NRBC (Absolute) 0.00 K/uL   Complete Metabolic Panel    Collection Time: 04/07/24  2:26 PM   Result Value Ref Range    Sodium 140 135 - 145 mmol/L    Potassium 3.4 (L) 3.6 - 5.5 mmol/L    Chloride 103 96 - 112 mmol/L    Co2 24 20 - 33 mmol/L    Anion Gap 13.0 7.0 - 16.0    Glucose 104 (H) 65 - 99 mg/dL    Bun 9 8 - 22 mg/dL    Creatinine 0.40 (L) 0.50 - 1.40 mg/dL    Calcium 8.5 8.5 - 10.5 mg/dL    Correct Calcium 9.5 8.5 - 10.5 mg/dL    AST(SGOT) 52 (H) 12 - 45 U/L    ALT(SGPT) 14 2 - 50 U/L    Alkaline Phosphatase 146 (H) 30 - 99 U/L    Total Bilirubin 0.7 0.1 - 1.5 mg/dL    Albumin 2.8 (L) 3.2 - 4.9  g/dL    Total Protein 6.4 6.0 - 8.2 g/dL    Globulin 3.6 (H) 1.9 - 3.5 g/dL    A-G Ratio 0.8 g/dL   Blood Culture - Draw one from central line and one from peripheral site    Collection Time: 24  2:26 PM    Specimen: Peripheral; Blood   Result Value Ref Range    Significant Indicator POS (POS)     Source BLD     Site PERIPHERAL     Culture Result (A)      Growth detected by Bactec instrument. 2024  10:38  Gram Stain: Gram positive cocci: Possible Staphylococcus sp.  Negative for Staphylococcus aureus and MRSA by PCR. Correlate  ongoing need for antibiotics with clinical condition.  Further report to follow.     CREATINE KINASE    Collection Time: 24  2:26 PM   Result Value Ref Range    CPK Total 1182 (H) 0 - 154 U/L   ESTIMATED GFR    Collection Time: 24  2:26 PM   Result Value Ref Range    GFR (CKD-EPI) 106 >60 mL/min/1.73 m 2   EKG    Collection Time: 24  2:33 PM   Result Value Ref Range    Report       [facility] Emergency Dept.    Test Date:  2024  Pt Name:    CONRAD PABON                 Department: ER  MRN:        [id number]                      Room:       [facility]  Gender:     Female                       Technician: [id number]  :        [id number]                   Requested By:GRACIELA MARK  Order #:    [id number]                    Reading MD: GRACIELA MARK MD    Measurements  Intervals                                Axis  Rate:       91                           P:          0  NM:         0                            QRS:        -69  QRSD:       120                          T:          106  QT:         430  QTc:        530    Interpretive Statements  Atrial fibrillation  Incomplete RBBB and LAFB  Anteroseptal infarct, age indeterminate  Compared to ECG 07/10/2017 08:07:48  Incomplete right bundle-branch block now present  Right bundle-branch block now present  Sinus rhythm no longer present  First degree AV block no longer present  Myocardial infarct   finding still present  Electronically Signed On 04- 18:47:12 PDT by GRACIELA MARK MD     Urinalysis    Collection Time: 04/07/24  3:58 PM    Specimen: Urine   Result Value Ref Range    Color Yellow     Character Clear     Specific Gravity 1.007 <1.035    Ph 7.5 5.0 - 8.0    Glucose Negative Negative mg/dL    Ketones Negative Negative mg/dL    Protein Negative Negative mg/dL    Bilirubin Negative Negative    Urobilinogen, Urine 0.2 Negative    Nitrite Negative Negative    Leukocyte Esterase Negative Negative    Occult Blood Negative Negative    Micro Urine Req see below    Urine Culture (New)    Collection Time: 04/07/24  3:58 PM    Specimen: Urine   Result Value Ref Range    Significant Indicator NEG     Source UR     Site -     Culture Result Culture in progress.    Lactic Acid    Collection Time: 04/07/24  4:54 PM   Result Value Ref Range    Lactic Acid 2.1 (H) 0.5 - 2.0 mmol/L   Lactic Acid    Collection Time: 04/07/24  7:06 PM   Result Value Ref Range    Lactic Acid 1.6 0.5 - 2.0 mmol/L   CBC with Differential    Collection Time: 04/08/24  1:28 AM   Result Value Ref Range    WBC 6.1 4.8 - 10.8 K/uL    RBC 3.71 (L) 4.20 - 5.40 M/uL    Hemoglobin 9.7 (L) 12.0 - 16.0 g/dL    Hematocrit 31.0 (L) 37.0 - 47.0 %    MCV 83.6 81.4 - 97.8 fL    MCH 26.1 (L) 27.0 - 33.0 pg    MCHC 31.3 (L) 32.2 - 35.5 g/dL    RDW 53.1 (H) 35.9 - 50.0 fL    Platelet Count 198 164 - 446 K/uL    MPV 9.8 9.0 - 12.9 fL    Neutrophils-Polys 64.30 44.00 - 72.00 %    Lymphocytes 23.40 22.00 - 41.00 %    Monocytes 9.90 0.00 - 13.40 %    Eosinophils 1.60 0.00 - 6.90 %    Basophils 0.50 0.00 - 1.80 %    Immature Granulocytes 0.30 0.00 - 0.90 %    Nucleated RBC 0.00 0.00 - 0.20 /100 WBC    Neutrophils (Absolute) 3.91 1.82 - 7.42 K/uL    Lymphs (Absolute) 1.42 1.00 - 4.80 K/uL    Monos (Absolute) 0.60 0.00 - 0.85 K/uL    Eos (Absolute) 0.10 0.00 - 0.51 K/uL    Baso (Absolute) 0.03 0.00 - 0.12 K/uL    Immature Granulocytes (abs) 0.02 0.00 -  0.11 K/uL    NRBC (Absolute) 0.00 K/uL   Comp Metabolic Panel (CMP)    Collection Time: 04/08/24  1:28 AM   Result Value Ref Range    Sodium 143 135 - 145 mmol/L    Potassium 3.1 (L) 3.6 - 5.5 mmol/L    Chloride 107 96 - 112 mmol/L    Co2 29 20 - 33 mmol/L    Anion Gap 7.0 7.0 - 16.0    Glucose 116 (H) 65 - 99 mg/dL    Bun 8 8 - 22 mg/dL    Creatinine 0.39 (L) 0.50 - 1.40 mg/dL    Calcium 7.9 (L) 8.5 - 10.5 mg/dL    Correct Calcium 9.3 8.5 - 10.5 mg/dL    AST(SGOT) 34 12 - 45 U/L    ALT(SGPT) 13 2 - 50 U/L    Alkaline Phosphatase 121 (H) 30 - 99 U/L    Total Bilirubin 0.5 0.1 - 1.5 mg/dL    Albumin 2.3 (L) 3.2 - 4.9 g/dL    Total Protein 5.2 (L) 6.0 - 8.2 g/dL    Globulin 2.9 1.9 - 3.5 g/dL    A-G Ratio 0.8 g/dL   CREATINE KINASE    Collection Time: 04/08/24  1:28 AM   Result Value Ref Range    CPK Total 800 (H) 0 - 154 U/L   AMMONIA    Collection Time: 04/08/24  1:28 AM   Result Value Ref Range    Ammonia 43 11 - 45 umol/L   VITAMIN B12    Collection Time: 04/08/24  1:28 AM   Result Value Ref Range    Vitamin B12 -True Cobalamin 522 211 - 911 pg/mL   TSH WITH REFLEX TO FT4    Collection Time: 04/08/24  1:28 AM   Result Value Ref Range    TSH 0.590 0.380 - 5.330 uIU/mL   ESTIMATED GFR    Collection Time: 04/08/24  1:28 AM   Result Value Ref Range    GFR (CKD-EPI) 107 >60 mL/min/1.73 m 2         ASSESSMENT:  Patient is a 69 y.o. female admitted with ground-level fall and rhabdomyolysis, found to have L2 and L4 acute compression fractures being treated nonoperatively    Rehabilitation: Impaired ADLs and mobility  Barriers to transfer include: Insurance authorization, TCCs to verify disposition, medical clearance and bed availability. All cases are subject to administrative review.     [facility] Code / Diagnosis to Support: 0008.9 - Orthopaedic Disorders: Other Orthopaedic    Disposition recommendations:  -Currently, patient is functioning at too low of a level to qualify for IPR.  She is not expected to have a reasonable  amount improvement in a reasonable amount of time using the combined approach to be able to return home to living independently due to her high risk for falls.  She uses a power wheelchair at baseline and has support from housekeeping and home health services.  -To qualify for IPR patient must be able to perform stand pivot transfers at mod assist or better, and be completing ADLs at min to mod assist.  Patient would also greatly benefit from increased supervision at home, which does not appear to be available.  -PMR to follow in the periphery for rehab appropriateness, please reach out with questions or request for medical management    Medical Complexity:    Lumbar compression fractures  -Secondary to ground-level fall while transferring  -CT scan showing acute L2 and L4 compression fractures, chronic L3 compression fracture  -Assessed by neurosurgery and not a surgical candidate, being treated conservatively with TLSO, pain control and therapy  -Kyphoplasty not indicated at this time, if patient continues to have function limiting pain in 3 to 4 weeks, then she can be considered for kyphoplasty as an outpatient  -Continue PT OT while in-house  -Plan on SNF placement given barriers to IPR, and high risk for returning home alone.    Pain control  -Tylenol 650 mg every 6 as needed  -Lidocaine patch 4% daily  -Roxicodone 5-10 mg every 3 hours as needed (using minimally)    Hypokalemia  -Potassium 3.1  -Primary team monitoring and replacing as needed    Rhabdomyolysis  -  -Primary team treated with IV fluids  -Kidneys okay    DVT PPX: Xarelto       Thank you for allowing us to participate in the care of this patient.     Patient was seen for >80 minutes on unit/floor of which > 50% of time was spent on counseling and coordination of care regarding the above, including prognosis, risk reduction, benefits of treatment, and options for next stage of care.    Damion Kim, DO   Physical Medicine and Rehabilitation      Please note that this dictation was created using voice recognition software. I have made every reasonable attempt to correct obvious errors, but there may be errors of grammar and possibly content that I did not discover before finalizing the note.

## 2024-04-08 NOTE — DISCHARGE PLANNING
Care Transition Team Assessment    LSW met with pt at bedside to complete assessment. Pt verified the information on the face sheet. Pt lives alone in a third floor apartment that has elevator access. Pt stated she is independent at home, however has difficulty with most ADLs and IADLs. Pt primarily uses an electric wheelchair, however also has a cane, FWW, bedside commode, and a manual wheelchair that she uses in her shower. Pt stated she has a care giver that comes 4 days/week for 2 hours/day who assists her with all ADLs or IADLs as needed. Pt does not drive and has previously utilized RTC access. Pt stated she has groceries delivered. Pt given the information for MTM benefits as well. Pt stated she receives $999.40/month in SSI. No SA or MH concerns. Pt does not have an advance directive, however her NOK is her son, Dean Briscoe ([phone]) who lives in [place]. Pt denies having any other local supports, aside from her paid care giver.    LSW spoke with pt about post acute placement recommendations. Pt stated she has been to [place] in the past, and would not want to return there. Pt gave choice for 1) [facility] 2) [facility] 3) [facility] and 3) [facility]. Choice forms faxed to [facility].    Information Source  Orientation Level: Oriented to place, Oriented to situation, Oriented to person, Disoriented to time  Information Given By: Patient  Informant's Name: Nieves Murphy  Who is responsible for making decisions for patient? : Patient    Readmission Evaluation  Is this a readmission?: No    Elopement Risk  Legal Hold: No  Ambulatory or Self Mobile in Wheelchair: No-Not an Elopement Risk  Elopement Risk: Not at Risk for Elopement    Interdisciplinary Discharge Planning  Lives with - Patient's Self Care Capacity: Alone and Able to Care For Self  Patient or legal guardian wants to designate a caregiver: No  Support Systems: Other (Comments) (part time care giver)  Housing / Facility: 3 Story Apartment /  Condo  Prior Services: Intermittent Physical Support for ADL Per Service, Housekeeping / Homemaker Services ([facility])  Durable Medical Equipment: Walker, Commode, Other - Specify (cane, power wheelchair, manual wheelchair (uses in the shower))    Discharge Preparedness  What is your plan after discharge?: Skilled nursing facility, Other (comment) (vs IRF)  What are your discharge supports?: Other (comment) (caregiver)  Prior Functional Level: Ambulatory, Independent with Activities of Daily Living, Independent with Medication Management, Uses Wheelchair  Difficulity with ADLs: Bathing, Dressing, Walking, Toileting  Difficulity with IADLs: Cooking, Driving, Laundry, Shopping    Functional Assesment  Prior Functional Level: Ambulatory, Independent with Activities of Daily Living, Independent with Medication Management, Uses Wheelchair    Finances  Financial Barriers to Discharge: No  Prescription Coverage: Yes    Vision / Hearing Impairment  Vision Impairment : Yes  Right Eye Vision: Wears Glasses  Left Eye Vision: Wears Glasses  Hearing Impairment : No    Advance Directive  Advance Directive?: None  Advance Directive offered?: AD Booklet refused    Domestic Abuse  Have you ever been the victim of abuse or violence?: No  Physical Abuse or Sexual Abuse: No  Verbal Abuse or Emotional Abuse: No  Possible Abuse/Neglect Reported to:: Not Applicable    Psychological Assessment  History of Substance Abuse: None  History of Psychiatric Problems: No  Non-compliant with Treatment: No  Newly Diagnosed Illness: No    Discharge Risks or Barriers  Discharge risks or barriers?: Lives alone, no community support  Patient risk factors: Lives alone and no community support    Anticipated Discharge Information  Discharge Disposition: D/T to SNF with Medicare cert in anticipation of skilled care (03)

## 2024-04-08 NOTE — ED NOTES
Repeat lactic collected and sent.   Bolus hung on pressure bag.   Pt answered orientation questions well. Denies want to roll to remove porsche  underneath her. States she's comfortable.       Bedside report given to syl BARRON. Pt resting in [facility] with stable vitals on her oxygen.

## 2024-04-08 NOTE — PROGRESS NOTES
"      Trauma tertiary and SBIRT per trauma guidelines and quality measures. This note does not constitute as a formal trauma consult. Please defer all management to primary team.     Mental status adequate for full examination?: Yes    Spine cleared (radiologically and/or clinically): Yes    REVIEW OF SYSTEMS:  Review of Systems   HENT: Negative.     Musculoskeletal:  Positive for back pain, falls, joint pain and myalgias.   Skin: Negative.    Neurological: Negative.        PHYSICAL EXAMINATION:  BP (!) 147/65   Pulse 75   Temp 36.5 °C (97.7 °F) (Temporal)   Resp 18   Ht 1.575 m (5' 2\")   Wt (!) 130 kg (286 lb 9.6 oz)   SpO2 95%   BMI 52.42 kg/m²   Physical Exam  Constitutional:       Appearance: Normal appearance. She is obese.   HENT:      Head: Normocephalic and atraumatic.      Nose: No congestion.   Cardiovascular:      Rate and Rhythm: Normal rate.      Heart sounds: No murmur heard.  Pulmonary:      Effort: Pulmonary effort is normal.      Breath sounds: Normal breath sounds.   Abdominal:      General: There is no distension.      Palpations: Abdomen is soft. There is no mass.      Tenderness: There is no abdominal tenderness.   Musculoskeletal:         General: Tenderness (back pain) present.      Right lower leg: Edema present.      Left lower leg: Edema present.   Skin:     General: Skin is warm.      Capillary Refill: Capillary refill takes less than 2 seconds.   Neurological:      General: No focal deficit present.      Mental Status: She is alert and oriented to person, place, and time.   Psychiatric:         Mood and Affect: Mood normal.         Behavior: Behavior normal.         LABORATORY VALUES:  Recent Labs     04/07/24  1426 04/08/24  0128   WBC 8.3 6.1   RBC 4.15* 3.71*   HEMOGLOBIN 11.1* 9.7*   HEMATOCRIT 35.0* 31.0*   MCV 84.3 83.6   MCH 26.7* 26.1*   MCHC 31.7* 31.3*   RDW 53.6* 53.1*   PLATELETCT 256 198   MPV 10.1 9.8     Recent Labs     04/07/24  1426 04/08/24  0128   SODIUM 140 143 "   POTASSIUM 3.4* 3.1*   CHLORIDE 103 107   CO2 24 29   GLUCOSE 104* 116*   BUN 9 8   CREATININE 0.40* 0.39*   CALCIUM 8.5 7.9*     Recent Labs     04/07/24  1426 04/08/24  0128   ASTSGOT 52* 34   ALTSGPT 14 13   TBILIRUBIN 0.7 0.5   ALKPHOSPHAT 146* 121*   GLOBULIN 3.6* 2.9   AMMONIA  --  43           IMAGING:  CT-LSPINE W/O PLUS RECONS   Final Result      1.  There is a transitional segment at the lumbosacral junction. For the purposes of this report the transitional segment is being defined as the L5 level which is sacralized. By this counting the L1 vertebral body is the first nonrib-bearing vertebral    body and there are rudimentary small ribs at T12. If any intervention is considered or planned, need to take into account the presence of transitional segment anatomy to ensure appropriate level therapy.   2.  Acute compression fractures of L2 and L4.   3.  Chronic compression fracture L3.   4.  Degenerative changes with multilevel central canal and neural foraminal stenoses. This is most severe at the L3-4 level where there is severe degenerative central canal stenosis.      DX-FEMUR-2+ RIGHT   Final Result      No acute fracture detected.      CT-MAXILLOFACIAL W/O PLUS RECONS   Final Result            1. No acute maxillofacial fracture.      CT-HEAD W/O   Final Result         1. No acute intracranial abnormality. No evidence of acute intracranial hemorrhage or mass lesion.                     DX-CHEST-PORTABLE (1 VIEW)   Final Result         1. Low lung volume with hypoventilatory change and bibasilar atelectasis.      EC-ECHOCARDIOGRAM COMPLETE W/O CONT    (Results Pending)       RAP Score Total: 0      CAGE Results: negative Blood Alcohol>0.08: not completed         Newly identified diagnoses, injuries and/or co-morbidities:  No new traumatic injury noted    Discussed patient condition with Patient.

## 2024-04-08 NOTE — ASSESSMENT & PLAN NOTE
- Continue home duloxetine  - Holding home Abilify and gabapentin  - Holding home Xanax prescribed as needed

## 2024-04-08 NOTE — ASSESSMENT & PLAN NOTE
CPK elevated to 1180 on admission likely from being on the ground for 20 hours at home.  Improved to 800 on recheck  - Received NS continuous infusion on admission, discontinued today  - Recommend oral intake as tolerated  - Holding Abilify and gabapentin

## 2024-04-08 NOTE — CARE PLAN
The patient is Stable - Low risk of patient condition declining or worsening    Shift Goals  Clinical Goals: safety, pain management, TLSO fitting  Patient Goals: rest, pain control  Family Goals: isabelle    Progress made toward(s) clinical / shift goals:    Problem: Pain - Standard  Goal: Alleviation of pain or a reduction in pain to the patient’s comfort goal  Description: Target End Date:  Prior to discharge or change in level of care    Document on Vitals flowsheet    1.  Document pain using the appropriate pain scale per order or unit policy  2.  Educate and implement non-pharmacologic comfort measures (i.e. relaxation, distraction, massage, cold/heat therapy, etc.)  3.  Pain management medications as ordered  4.  Reassess pain after pain med administration per policy  5.  If opiods administered assess patient's response to pain medication is appropriate per POSS sedation scale  6.  Follow pain management plan developed in collaboration with patient and interdisciplinary team (including palliative care or pain specialists if applicable)  Outcome: Progressing     Problem: Knowledge Deficit - Standard  Goal: Patient and family/care givers will demonstrate understanding of plan of care, disease process/condition, diagnostic tests and medications  Description: Target End Date:  1-3 days or as soon as patient condition allows    Document in Patient Education    1.  Patient and family/caregiver oriented to unit, equipment, visitation policy and means for communicating concern  2.  Complete/review Learning Assessment  3.  Assess knowledge level of disease process/condition, treatment plan, diagnostic tests and medications  4.  Explain disease process/condition, treatment plan, diagnostic tests and medications  Outcome: Progressing

## 2024-04-08 NOTE — DISCHARGE PLANNING
Received choice form @: [id number]  Agency/Facility name: [facility], [facility]  Sent referral per choice form @: [phone]

## 2024-04-08 NOTE — ASSESSMENT & PLAN NOTE
Presumably strike her head when she fell, head right periorbital edema on admission.  CT head negative.  CT maxillofacial negative.

## 2024-04-08 NOTE — HOSPITAL COURSE
Nieves Murphy is a 69 y.o. female admitted 4/7/2024 with new onset atrial fibrillation, history of left lower extremity DVT on rivaroxaban 20 mg daily, type 2 diabetes on metformin presented after ground-level fall.  Patient was transferring from recliner to wheelchair that utilizes daily for chronic weakness.  She fell to ground and remained down for 20 hours.  Imaging on admission notable for acute L3 and L4 compression fractures with chronic L3 compression fracture.  Neurosurgery consulted on admission recommended TLSO brace when out of bed and outpatient follow-up in 6 weeks.

## 2024-04-08 NOTE — DISCHARGE PLANNING
[facility] Transitional Care Coordination    Referral from: Dr Her  Insurance Provider on Facesheet: Medicaid  Potential Rehab Diagnosis: TSCI    Chart review indicates patient may have on going medical management and may have therapy needs to possibly meet inpatient rehab facility criteria with the goal of returning to community.    D/C support: TBD     Physiatry consultation pended per protocol.     Physiatry consult pended, awaiting therapy evals as appropriate. TCC will follow.     Thank you for the referral.

## 2024-04-08 NOTE — ASSESSMENT & PLAN NOTE
CT of L-spine without contrast: Acute compression fracture L2 and L4, chronic compression fracture L3  Multilevel central canal and neural foraminal stenosis.  Likely after ground-level fall when transferring from recliner to ground.  -Neurosurgery consulted on admission recommended TLSO brace when out of bed  -Follow-up with neurosurgery in 6 weeks  -As needed oxycodone ordered  -Lidocaine patch  -PT/OT appreciate recommendations.  Ambulate as tolerated  -PMR consult for consideration for acute rehab facility  -Fall precautions  - PTH elevated in setting of normocalcemia with decreased vitamin D.  Will supplement vitamin D and continue to monitor likely benefit from DEXA scan outpatient.  Likely reassess PTH and calcium level in next few months with primary care provider.  -Cholecalciferol 1000 units daily  -Corrected calcium WNL

## 2024-04-08 NOTE — ASSESSMENT & PLAN NOTE
Resolved-lactic acid 2.2 on admission likely secondary to dehydration and elevated CPK improved with fluids.

## 2024-04-08 NOTE — CONSULTS
[facility]  Dr. Becerril  Reason for referral lumbar compression fracture  Called 8 PM return call [phone] images reviewed discussed plan with attending    Author: Edin Porras M.D. Date & Time created: 2024  9:28 PM     Interval History   69-year-old female who fell out of her recliner.  She had low back pain, was brought to the emergency room.  She has no overt leg weakness numbness tingling.    ROS per H&P    Patient Active Problem List    Diagnosis Date Noted    Lumbar compression fracture, closed, initial encounter ([facility]) 2024    Female stress incontinence 2017    Cystocele, midline 2017    Adnexal tenderness, right 2017    Rectocele 2017       Temp (24hrs), Av.4 °C (97.5 °F), Min:36.3 °C (97.3 °F), Max:36.4 °C (97.6 °F)    Pulse: 86, Respiration: 20, Blood Pressure : (!) 142/60, Weight: (!) 129 kg (285 lb 4.4 oz), Pulse Oximetry: 97 %, O2 (LPM): 3     Date 24 0700 - 24 0659   Shift 3143-2864 6982-2057 4395-6902 24 Hour Total   INTAKE   I.V.  1000  1000     Volume (mL) (lactated ringers (LR) bolus)  1000  1000   Shift Total  1000  1000   OUTPUT   Shift Total       NET  1000  1000         Intake/Output Summary (Last 24 hours) at 20248  Last data filed at 2024 1708  Gross per 24 hour   Intake 1000 ml   Output --   Net 1000 ml             enoxaparin (LOVENOX) injection  120 mg      acetaminophen  650 mg      Pharmacy Consult Request  1 Each      oxyCODONE immediate-release  5 mg      Or    oxyCODONE immediate-release  10 mg      Or    morphine injection  4 mg      senna-docusate  2 Tablet      And    polyethylene glycol/lytes  1 Packet      hydrALAZINE  10 mg      Metoprolol Tartrate  5 mg      NS        ondansetron  4 mg      ondansetron  4 mg      lidocaine  1 Patch      ALPRAZolam  0.25 mg      DULoxetine  20 mg      [START ON 2024] pantoprazole  20 mg         Recent Labs     24  1426   WBC 8.3   RBC 4.15*   HEMOGLOBIN 11.1*    HEMATOCRIT 35.0*   MCV 84.3   MCH 26.7*   PLATELETCT 256     Recent Labs     04/07/24  1426   SODIUM 140   POTASSIUM 3.4*   CHLORIDE 103   CO2 24   GLUCOSE 104*   BUN 9   CREATININE 0.40*   CALCIUM 8.5       4/7/2024 5:45 PM     HISTORY/REASON FOR EXAM:  T-5000 GLF back pain.        TECHNIQUE/EXAM DESCRIPTION AND NUMBER OF VIEWS:  CT lumbar spine without contrast, with reconstructions.     The study was performed on a helical multidetector CT scanner. Thin-section helical scanning was performed from T12-L1 to the sacrum. Sagittal and coronal multiplanar reconstructions were generated from the axial images.     Low dose optimization technique was utilized for this CT exam including automated exposure control and adjustment of the mA and/or kV according to patient size.     COMPARISON: None.     FINDINGS:     There is a transitional segment at the lumbosacral junction. For the purposes of this report the transitional segment is being defined as the L5 level which is sacralized. By this counting the L1 vertebral body is the first nonrib-bearing vertebral body   and there are rudimentary small ribs at T12. If any intervention is considered or planned, need to take into account the presence of transitional segment anatomy to ensure appropriate level therapy.     Acute appearing superior endplate fracture of the L2 vertebral body with moderate height loss.     Chronic appearing moderate superior endplate fracture of the L3 vertebral body.     Acute appearing moderate to severe compression fracture of the L4 vertebral body with fracture lines at the superior and inferior endplates.     T12-L1: No central canal or neural foraminal stenosis.     L1-2: Slight disc bulge. Mild central canal and neural foraminal stenosis.     L2-3: Mild or moderate narrowing of central canal. Moderate narrowing of neural foramina.     L3-4: Bilateral facet degenerative changes. Ligamentum flavum thickening and ossification of ligamentum  flavum on the right. Severe degenerative central canal stenosis. Severe right and moderate left neural foraminal stenosis.     L4-5: Diffuse disc bulge. Mild narrowing of central canal. Moderate facet degenerative changes. Mild central canal stenosis. Severe left and moderate right neural foraminal stenosis.     L5-S1: Mild narrow central canal. No neural foraminal stenosis.     IMPRESSION:     1.  There is a transitional segment at the lumbosacral junction. For the purposes of this report the transitional segment is being defined as the L5 level which is sacralized. By this counting the L1 vertebral body is the first nonrib-bearing vertebral   body and there are rudimentary small ribs at T12. If any intervention is considered or planned, need to take into account the presence of transitional segment anatomy to ensure appropriate level therapy.  2.  Acute compression fractures of L2 and L4.  3.  Chronic compression fracture L3.  4.  Degenerative changes with multilevel central canal and neural foraminal stenoses. This is most severe at the L3-4 level where there is severe degenerative central canal stenosis.    Assessment and plan: 69-year-old female with L2 and L4 apparent acute compression fractures with chronic L3 fracture in setting of very significant bone loss/osteoporosis.  Recommend TLSO when out of bed.

## 2024-04-08 NOTE — PROGRESS NOTES
[facility] Daily Progress Note    Date of Service  4/8/2024    [facility] Team: [facility] Blue Team   Attending: Fredy Delcid M.d.  Senior Resident: Dr. Kim  Intern:  Dr. Her  Contact Number: [phone]    Chief Complaint  Nieves Murphy is a 69 y.o. female admitted 4/7/2024 with new onset atrial fibrillation, history of left lower extremity DVT on rivaroxaban 20 mg daily, type 2 diabetes on metformin presented after ground-level fall.    Hospital Course  Nieves Murphy is a 69 y.o. female admitted 4/7/2024 with new onset atrial fibrillation, history of left lower extremity DVT on rivaroxaban 20 mg daily, type 2 diabetes on metformin presented after ground-level fall.  Patient was transferring from recliner to wheelchair that utilizes daily for chronic weakness.  She fell to ground and remained down for 20 hours.  Imaging on admission notable for acute L3 and L4 compression fractures with chronic L3 compression fracture.  Neurosurgery consulted on admission recommended TLSO brace when out of bed and outpatient follow-up in 6 weeks.    Interval Problem Update  Patient mated overnight for fall at home transfer from recliner to wheelchair which she uses chronically due to lower extremity weakness.  Endorses right hip pain although x-rays without hip or femur fracture other than prior repair distal femur fracture or superficial hematoma visualized.  States she was down for about 20 hours until neighbor checked on her and was able to assist.  Will continue to manage pain and utilize TLSO brace while up and evaluate for PMR acute rehab.    I have discussed this patient's plan of care and discharge plan at IDT rounds today with Case Management, Nursing, Nursing leadership, and other members of the IDT team.    Consultants/Specialty  neurosurgery    Code Status  Full Code    Disposition  The patient is not medically cleared for discharge to home or a post-acute facility.      I have placed the  appropriate orders for post-discharge needs.    Review of Systems  Review of Systems   Constitutional: Negative.    HENT: Negative.     Eyes: Negative.    Respiratory: Negative.     Cardiovascular:  Negative for chest pain and palpitations.   Gastrointestinal: Negative.    Genitourinary: Negative.    Musculoskeletal:  Positive for back pain, falls and joint pain.   Skin: Negative.    Neurological:  Positive for weakness.   Endo/Heme/Allergies: Negative.    Psychiatric/Behavioral: Negative.          Physical Exam  Temp:  [36 °C (96.8 °F)-36.9 °C (98.4 °F)] 36.5 °C (97.7 °F)  Pulse:  [64-92] 75  Resp:  [12-20] 18  BP: (100-187)/(59-92) 147/65  SpO2:  [94 %-100 %] 95 %    Physical Exam  Constitutional:       Appearance: Normal appearance. She is obese.   HENT:      Head: Normocephalic and atraumatic.      Nose: No congestion.   Eyes:      Extraocular Movements: Extraocular movements intact.      Conjunctiva/sclera: Conjunctivae normal.      Pupils: Pupils are equal, round, and reactive to light.   Cardiovascular:      Rate and Rhythm: Normal rate. Rhythm irregular.      Pulses: Normal pulses.      Heart sounds: Normal heart sounds.   Pulmonary:      Effort: Pulmonary effort is normal.      Breath sounds: Normal breath sounds.   Abdominal:      General: There is no distension.      Palpations: Abdomen is soft. There is no mass.      Tenderness: There is no abdominal tenderness.   Musculoskeletal:      Right lower leg: No edema.      Left lower leg: No edema.   Neurological:      General: No focal deficit present.      Mental Status: She is alert and oriented to person, place, and time.   Psychiatric:         Mood and Affect: Mood normal.         Behavior: Behavior normal.         Fluids    Intake/Output Summary (Last 24 hours) at 4/8/2024 1234  Last data filed at 4/8/2024 0435  Gross per 24 hour   Intake 1000 ml   Output 150 ml   Net 850 ml       Laboratory  Recent Labs     04/07/24  1426 04/08/24  0128   WBC 8.3 6.1    RBC 4.15* 3.71*   HEMOGLOBIN 11.1* 9.7*   HEMATOCRIT 35.0* 31.0*   MCV 84.3 83.6   MCH 26.7* 26.1*   MCHC 31.7* 31.3*   RDW 53.6* 53.1*   PLATELETCT 256 198   MPV 10.1 9.8     Recent Labs     04/07/24  1426 04/08/24  0128   SODIUM 140 143   POTASSIUM 3.4* 3.1*   CHLORIDE 103 107   CO2 24 29   GLUCOSE 104* 116*   BUN 9 8   CREATININE 0.40* 0.39*   CALCIUM 8.5 7.9*                   Imaging  CT-LSPINE W/O PLUS RECONS   Final Result      1.  There is a transitional segment at the lumbosacral junction. For the purposes of this report the transitional segment is being defined as the L5 level which is sacralized. By this counting the L1 vertebral body is the first nonrib-bearing vertebral    body and there are rudimentary small ribs at T12. If any intervention is considered or planned, need to take into account the presence of transitional segment anatomy to ensure appropriate level therapy.   2.  Acute compression fractures of L2 and L4.   3.  Chronic compression fracture L3.   4.  Degenerative changes with multilevel central canal and neural foraminal stenoses. This is most severe at the L3-4 level where there is severe degenerative central canal stenosis.      DX-FEMUR-2+ RIGHT   Final Result      No acute fracture detected.      CT-MAXILLOFACIAL W/O PLUS RECONS   Final Result            1. No acute maxillofacial fracture.      CT-HEAD W/O   Final Result         1. No acute intracranial abnormality. No evidence of acute intracranial hemorrhage or mass lesion.                     DX-CHEST-PORTABLE (1 VIEW)   Final Result         1. Low lung volume with hypoventilatory change and bibasilar atelectasis.      EC-ECHOCARDIOGRAM COMPLETE W/O CONT    (Results Pending)        Assessment/Plan  Problem Representation:    * Lumbar compression fracture, closed, initial encounter (HCC)- (present on admission)  Assessment & Plan  CT of L-spine without contrast: Acute compression fracture L2 and L4, chronic compression fracture  L3  Multilevel central canal and neural foraminal stenosis.  Likely after ground-level fall when transferring from recliner to ground.  -Neurosurgery consulted on admission recommended TLSO brace when out of bed  -Follow-up with neurosurgery in 6 weeks  -As needed oxycodone ordered  -Lidocaine patch  -PT/OT appreciate recommendations.  Ambulate as tolerated  -PMR consult for consideration for acute rehab facility  -Fall precautions  -Assess vitamin D and PTH level given compression fracture possible osteoporosis  -Corrected calcium WNL    GERD (gastroesophageal reflux disease)  Assessment & Plan  - Continue home omeprazole 20 mg daily    Mood disorder (HCC)  Assessment & Plan  - Continue home duloxetine  - Holding home Abilify and gabapentin  - Holding home Xanax prescribed as needed    Elevated CPK  Assessment & Plan  CPK elevated to 1180 on admission likely from being on the ground for 20 hours at home.  Improved to 800 on recheck  - Received NS continuous infusion on admission, discontinued today  - Recommend oral intake as tolerated  -Repeat CPK tomorrow morning to ensure downtrending  -Holding Abilify and gabapentin    History of DVT (deep vein thrombosis)- (present on admission)  Assessment & Plan  Continue Xarelto    Lactic acid acidosis- (present on admission)  Assessment & Plan  Resolved-lactic acid 2.2 on admission likely secondary to dehydration and elevated CPK improved with fluids.      Atrial fibrillation (HCC)  Assessment & Plan  New onset A-fib on admission.  Heart rate 92 on admission and beta-blocker was initiated.  Toprol 25 mg started on admission, rates now 60s to 80s  Patient already on therapeutic anticoagulation with rivaroxaban 20 mg daily that she is on for history of DVT  Monitor with telemetry, once rate stabilized we will discontinue  Transthoracic echo ordered and pending  TSH WNL on admission    Head injury  Assessment & Plan  Presumably strike her head when she fell, head right  periorbital edema on admission.  CT head negative.  CT maxillofacial negative.           VTE prophylaxis: therapeutic anticoagulation with 20mg xarelto    I have performed a physical exam and reviewed and updated ROS and Plan today (4/8/2024). In review of yesterday's note (4/7/2024), there are no changes except as documented above.

## 2024-04-08 NOTE — CARE PLAN
The patient is Stable - Low risk of patient condition declining or worsening    Shift Goals  Clinical Goals: Echo, pain mnagment, stable labs  Patient Goals: Rest, pain management  Family Goals: JANETH    Progress made toward(s) clinical / shift goals:    Problem: Knowledge Deficit - Standard  Goal: Patient and family/care givers will demonstrate understanding of plan of care, disease process/condition, diagnostic tests and medications  Description: Target End Date:  1-3 days or as soon as patient condition allows    Document in Patient Education    1.  Patient and family/caregiver oriented to unit, equipment, visitation policy and means for communicating concern  2.  Complete/review Learning Assessment  3.  Assess knowledge level of disease process/condition, treatment plan, diagnostic tests and medications  4.  Explain disease process/condition, treatment plan, diagnostic tests and medications  Outcome: Progressing  Note: Reviewed plan of care with patient, educated patient regarding atrial fibrillation and treatment course, explained rationale for echo ordered and importance of back brace when out of bed. Patient expressed understanding.      Problem: Skin Integrity  Goal: Skin integrity is maintained or improved  Description: Target End Date:  Prior to discharge or change in level of care    Document interventions on Skin Risk/[place] flowsheet groups and corresponding LDA    1.  Assess and monitor skin integrity, appearance and/or temperature  2.  Assess risk factors for impaired skin integrity and/or pressures ulcers  3.  Implement precautions to protect skin integrity in collaboration with interdisciplinary team  4.  Implement pressure ulcer prevention protocol if at risk for skin breakdown  5.  Confirm wound care consult if at risk for skin breakdown  6.  Ensure patient use of pressure relieving devices  (Low air loss bed, waffle overlay, heel protectors, ROHO cushion, etc)  Outcome: Progressing  Note: Patient's  skin assessed at beginning of shift, wound consult placed for moisture fissure on sacrum, Q2 turns in place HITESH in use.

## 2024-04-08 NOTE — ED NOTES
Pain reassessed. Pt states much better after medications.   Pt medicated for nausea. Position in bed adjusted. Lorenzo remains patent.

## 2024-04-08 NOTE — ED NOTES
Pt out to XRAY, experienced some nausea prior to xray. ERP notified. New orders placed. Will medicate pt once back from CT

## 2024-04-08 NOTE — PROGRESS NOTES
Neurosurgery Progress Note    Subjective:  No acute events  Complains of bilateral knee, right hip pain    Exam:  Bilateral IP thigh adduction thigh abduction DF PF 5/5  Sensation intact touch bilateral lower extremities    BP  Min: 100/59  Max: 187/92  Pulse  Av.5  Min: 64  Max: 92  Resp  Av.5  Min: 12  Max: 20  Temp  Av.3 °C (97.4 °F)  Min: 36 °C (96.8 °F)  Max: 36.9 °C (98.4 °F)  SpO2  Av.2 %  Min: 94 %  Max: 100 %    No data recorded    Recent Labs     24  1426 24  0128   WBC 8.3 6.1   RBC 4.15* 3.71*   HEMOGLOBIN 11.1* 9.7*   HEMATOCRIT 35.0* 31.0*   MCV 84.3 83.6   MCH 26.7* 26.1*   MCHC 31.7* 31.3*   RDW 53.6* 53.1*   PLATELETCT 256 198   MPV 10.1 9.8     Recent Labs     24  1426 24  0128   SODIUM 140 143   POTASSIUM 3.4* 3.1*   CHLORIDE 103 107   CO2 24 29   GLUCOSE 104* 116*   BUN 9 8   CREATININE 0.40* 0.39*   CALCIUM 8.5 7.9*               Intake/Output                         24 - 24 0659 24 - 24 0659     1900-0659 Total  Total                 Intake    I.V.  1000  -- 1000  --  -- --    Volume (mL) (lactated ringers (LR) bolus) 1000 -- 1000 -- -- --    Total Intake 1000 -- 1000 -- -- --       Output    Urine  --  150 150  --  -- --    Urine Void (mL) -- 150 150 -- -- --    Stool  --  -- --  --  -- --    Number of Times Stooled -- 0 x 0 x -- -- --    Total Output -- 150 150 -- -- --       Net I/O     1000 -150 850 -- -- --              Intake/Output Summary (Last 24 hours) at 2024 1002  Last data filed at 2024 0435  Gross per 24 hour   Intake 1000 ml   Output 150 ml   Net 850 ml             acetaminophen  650 mg Q6HRS PRN    Pharmacy Consult Request  1 Each PHARMACY TO DOSE    oxyCODONE immediate-release  5 mg Q3HRS PRN    Or    oxyCODONE immediate-release  10 mg Q3HRS PRN    Or    morphine injection  4 mg Q3HRS PRN    senna-docusate  2 Tablet Q EVENING    And    polyethylene glycol/lytes  1  Packet QDAY PRN    hydrALAZINE  10 mg Q4HRS PRN    Metoprolol Tartrate  5 mg Q HOUR PRN    NS   Continuous    ondansetron  4 mg Q4HRS PRN    ondansetron  4 mg Q4HRS PRN    lidocaine  1 Patch Q24HR    ALPRAZolam  0.25 mg TID PRN    DULoxetine  20 mg BID    omeprazole  20 mg DAILY    rivaroxaban  20 mg PM MEAL    metoprolol SR  25 mg Q DAY       Assessment and Plan:  Hospital day #2 L2, 4 compression fractures  POD #NA  Prophylactic anticoagulation: yes         Start date/time: today if clinically indicated     Brain Compression: No    TLSO when out of bed  Pain management  RTC 6 weeks  Neurosurgery will sign off

## 2024-04-08 NOTE — ED NOTES
Bedside report received from off going RN/tech: ASSIGNED RN, assumed care of patient.  POC discussed with patient. Call light within reach, all needs addressed at this time.       Fall risk interventions in place: Move the patient closer to the nurse's station, Patient's personal possessions are with in their safe reach, Place socks on patient, Place fall risk sign on patient's door, Give patient urinal if applicable, and Keep floor surfaces clean and dry (all applicable per [place] Fall risk assessment)   Continuous monitoring: Cardiac Leads, Pulse Ox, or Blood Pressure  IVF/IV medications: Not Applicable   Oxygen: How many liters 3L  Bedside sitter: Not Applicable   Isolation: Not Applicable

## 2024-04-08 NOTE — THERAPY
Occupational Therapy   Initial Evaluation     Patient Name: Nieves Murphy  Age:  69 y.o., Sex:  female  Medical Record #: [id number]  Today's Date: 4/8/2024     Precautions  Precautions: Fall Risk  Comments: BMI 52.42    Assessment    Patient is 69 y.o. female admitted with acute compression fractures of L2 and L4, chronic compression fracture at L3 after a fall from her wheelchair. Other pertinent medical history includes a-fib, L LE DVT on rivaroxaban, DM, GERD, and mood disorder. Pt seen for OT evaluation. Pt required max A to don/doff socks, max A for brace (poor fit; text to traction and voicemail left with [facility]), and SBA to wash face while seated EOB. Pt was living alone but has caregivers Monday-Thursday who assist primarily with IADLs but could assist with ADLs. Pt educated regarding spinal precautions, and the role of OT. Pt current functional performance limited by impaired activity tolerance, impaired balance, generalized weakness, and limited knowledge of spinal precautions. Pt will benefit from skilled OT while admitted to acute care.     Plan    Occupational Therapy Initial Treatment Plan   Treatment Interventions: Self Care / Activities of Daily Living, Adaptive Equipment, Neuro Re-Education / Balance, Therapeutic Exercises, Therapeutic Activity  Treatment Frequency: 3 Times per Week  Duration: Until Therapy Goals Met    DC Equipment Recommendations: Unable to determine at this time  Discharge Recommendations: Recommend post-acute placement for additional occupational therapy services prior to discharge home (PM&R)      Objective     04/08/24 1141   Prior Living Situation   Prior Services Intermittent Physical Support for ADL Per Service   Housing / Facility 3 Story Apartment / Condo   Elevator Yes   Bathroom Set up Walk In Shower;Grab Bars  (uses wheelchair as shower chair)   Equipment Owned Front-Wheel Walker;Single Point Cane;Power Wheel Chair;Reacher;Raised Toilet Seat With Arms;Grab Bar(s)  In Tub / Shower   Lives with - Patient's Self Care Capacity Alone and Unable to Care For Self   Comments Pt has caregivers Monday-Thursday.   Prior Level of ADL Function   Self Feeding Independent   Grooming / Hygiene Independent   Bathing Independent   Dressing Independent   Toileting Independent   Comments Pt reported the caregivers can assist, but she generally completes this independently.   Prior Level of IADL Function   Laundry Requires Assist   Home Management Requires Assist   Shopping Requires Assist   Prior Level Of Mobility Independent With Device in Community;Independent With Device in Home   Driving / Transportation Relatives / Others Provide Transportation   History of Falls   History of Falls Yes   Date of Last Fall   (Pt reported fall from wheelchair to curb while waiting for the bus.)   Precautions   Precautions Fall Risk   Pain   Pain Scales 0 to 10 Scale    Pain 0 - 10 Group   Therapist Pain Assessment During Activity;Nurse Notified  (hip/back pain)   Cognition    Cognition / Consciousness WDL   Level of Consciousness Alert   Comments Very pleasant, cooperative, receptive to education   Passive ROM Upper Body   Passive ROM Upper Body WDL   Active ROM Upper Body   Active ROM Upper Body  WDL   Strength Upper Body   Comments 3+/5 bilaterally   Sensation Upper Body   Upper Extremity Sensation  WDL   Upper Body Muscle Tone   Upper Body Muscle Tone  WDL   Coordination Upper Body   Coordination WDL   Balance Assessment   Sitting Balance (Static) Good   Sitting Balance (Dynamic) Fair +   Weight Shift Sitting Fair   Comments EOB only   Bed Mobility    Supine to Sit   (EOB with PT prior)   Sit to Supine Moderate Assist   Rolling Minimum Assist to Lt.   ADL Assessment   Grooming Seated;Standby Assist  (washed face while sitting EOB)   Upper Body Dressing Maximal Assist  (don/doff brace, poor fitting brace)   Lower Body Dressing Maximal Assist  (don/doff socks)   Functional Mobility   Sit to Stand Unable to  Participate   Comments EOB only due to fatigue of being up with PT prior   Visual Perception   Visual Perception  Not Tested   Activity Tolerance   Sitting in Chair NT   Sitting Edge of Bed EOB prior w/ PT   Standing NT   Comments limited by weakness   Patient / Family Goals   Patient / Family Goal #1 to get stronger   Short Term Goals   Short Term Goal # 1 Pt will perform ADL transfer w/ min A   Short Term Goal # 2 Pt will perform LB dressing w/ supv and AE PRN   Short Term Goal # 3 Pt will don/doff brace with supv   Education Group   Education Provided Role of Occupational Therapist;Activities of Daily Living;Spinal Precautions;Brace Wear and Care   Role of Occupational Therapist Patient Response Patient;Acceptance;Explanation;Verbal Demonstration   Spinal Precautions Patient Response Patient;Acceptance;Demonstration;Explanation;Verbal Demonstration;Action Demonstration   Brace Wear & Care Patient Response Patient;Acceptance;Explanation;Demonstration;Verbal Demonstration;Action Demonstration   ADL Patient Response Patient;Acceptance;Explanation;Demonstration;Verbal Demonstration;Action Demonstration   Occupational Therapy Initial Treatment Plan    Treatment Interventions Self Care / Activities of Daily Living;Adaptive Equipment;Neuro Re-Education / Balance;Therapeutic Exercises;Therapeutic Activity   Treatment Frequency 3 Times per Week   Duration Until Therapy Goals Met   Problem List   Problem List Decreased Homemaking Skills;Decreased Active Daily Living Skills;Decreased Functional Mobility;Decreased Activity Tolerance;Safety Awareness Deficits / Cognition;Limited Knowledge of Post Op Precautions

## 2024-04-08 NOTE — PROGRESS NOTES
TLSO fit to patient for post-discharge use.    Met with the patient to educate on proper donning, doffing, and adjustment of the brace if necessary. The brace fits well and the patient is comfortable with the use of the brace. Patient is instructed to take the brace home and wear according to physician's orders after discharge. All relevant questions and concerns answered at this time.    Contact traction with any questions or concerns regarding the use of this brace.

## 2024-04-08 NOTE — ASSESSMENT & PLAN NOTE
New onset A-fib on admission.  Heart rate 92 on admission and beta-blocker was initiated.  Toprol 25 mg started on admission, rates now 60s to 80s  Patient already on therapeutic anticoagulation with rivaroxaban 20 mg daily that she is on for history of DVT  Transthoracic echo ordered notable for left atrial dilation along with pulmonary hypertension  Remove telemetry as rates are stable  TSH WNL on admission

## 2024-04-08 NOTE — PROGRESS NOTES
4 Eyes Skin Assessment Completed by ANDERSON Schafer and ANDERSON Raymond.    Head Bruising and Swelling to right eye  Ears WDL  Nose WDL  Mouth WDL  Neck WDL  Breast/Chest WDL  Shoulder Blades WDL  Spine WDL  (R) Arm/Elbow/Hand Bruising  (L) Arm/Elbow/Hand Bruising, Abrasion, and Swelling  Abdomen Redness and Rash  Groin Redness, Excoriation, and Rash  Scrotum/Coccyx/Buttocks Redness, Blanching, and Excoriation  (R) Leg Scar  (L) Leg WDL  (R) Heel/Foot/Toe WDL  (L) Heel/Foot/Toe WDL          Devices In Places Tele Box, Blood Pressure Cuff, Pulse Ox, and Nasal Cannula      Interventions In Place Gray Ear Foams, NC W/Ear Foams, TAP System, Pillows, Q2 Turns, and Low Air Loss Mattress    Possible Skin Injury Yes    Pictures Uploaded Into Epic Yes  Wound Consult Placed N/A  RN Wound Prevention Protocol Ordered No

## 2024-04-08 NOTE — H&P
Hospital Medicine History & Physical Note    Date of Service  4/7/2024    Primary Care Physician  Maribel Salazar M.D.        Code Status  Full Code    Chief Complaint  Chief Complaint   Patient presents with    T-5000 GLF     Pt tried to get out of her recliner last night, felt weak & fell to her knees. Friends found her on the ground this morning. Does not remember if she hit her head, however R eye appears bruised. On blood thinners for DVT, unsure which. Pt is oriented but groggy & does not fully recall falling. Pt smells of urine, is slow to respond.       History of Presenting Illness  Nieves Murphy is a 69 y.o. female with past medical history of left leg DVT, on Xarelto, type 2 diabetes, restless leg syndrome, obesity, anxiety, GERD left hip pain after fracture of the left hip 1 year ago, who presented 4/7/2024 with complaints of lower back pain worsening with movement after she fell last night, trying to transfer herself from a chair to a wheelchair.  She spent night on the floor, being unable to get up until her friend called 911   she is a wheelchair-bound and lives alone.  She states that she fell on the right side, without loss of consciousness.  After that she noticed swelling around the right eye and lower back pain.  Upon evaluation, patient has elevated CPK 1182, lactic acid 2.2.  CAT scan revealed L2 L4 acute compression fracture and L3 chronic compression fracture.  Patient was given 1 L of saline.  Case was discussed with Dr. Porras/spinal surgery, who recommended TLSO and follow-up as outpatient.  No plan for surgery.  In ER she noted somewhat slow to respond.  CT head and CT maxillofacial is negative.  X-ray of the right hip was negative.  Chest x-ray: Bibasilar atelectasis.    On EKG patient noted atrial fibrillation with heart rate 93.  Onset is unknown.  Patient denies palpitations or chest pain.  Denies history of A-fib.    I discussed the plan of care with patient and ERP  .    Review of Systems  Review of Systems   Constitutional:  Negative for chills, fever and weight loss.   HENT:  Negative for ear pain, hearing loss and tinnitus.    Eyes:  Negative for blurred vision, double vision and photophobia.        Swelling around the right eye   Respiratory:  Negative for cough, hemoptysis and sputum production.    Cardiovascular:  Negative for chest pain, palpitations and orthopnea.   Gastrointestinal:  Negative for heartburn, nausea and vomiting.   Genitourinary:  Negative for dysuria, flank pain, frequency and hematuria.   Musculoskeletal:  Positive for back pain, falls and joint pain. Negative for neck pain.   Skin:  Negative for itching and rash.   Neurological:  Negative for tremors, speech change, focal weakness and headaches.   Endo/Heme/Allergies:  Negative for environmental allergies and polydipsia. Does not bruise/bleed easily.   Psychiatric/Behavioral:  Negative for hallucinations and substance abuse. The patient is not nervous/anxious.        Past Medical History   has a past medical history of Anxiety, Arthritis, Dental disorder (2016), DJD (degenerative joint disease), DVT (deep venous thrombosis) ([facility]) ([facility]) (2-2016), Gynecological disorder, Heart burn (2016), Indigestion, Obesity, and Restless leg syndrome.    Surgical History   has a past surgical history that includes other abdominal surgery; vaginal hysterectomy scope total (1/3/2017); anterior and posterior repair (1/3/2017); enterocele repair (1/3/2017); vaginal suspension (1/3/2017); bladder sling female (1/3/2017); cystoscopy (1/3/2017); anterior and posterior repair (7/18/2017); enterocele repair (7/18/2017); vaginal suspension (7/18/2017); and bladder sling female (7/18/2017).     Family History  family history is not on file.   Family history reviewed with patient. There is no family history that is pertinent to the chief complaint.     Social History   reports that she quit smoking about 50 years ago. She  does not have any smokeless tobacco history on file. She reports that she does not drink alcohol and does not use drugs.    Allergies  Allergies   Allergen Reactions    Asa [Aspirin] Unspecified     GI DISCOMFORT    Pcn [Penicillins] Unspecified     GI DISCOMFORT       Medications  Prior to Admission Medications   Prescriptions Last Dose Informant Patient Reported? Taking?   alprazolam (XANAX) 0.25 MG Tab  Patient Yes No   Sig: Take 0.25 mg by mouth 3 times a day as needed for Sleep or Anxiety.   aripiprazole (ABILIFY) 2 MG tablet  Patient Yes No   Sig: Take 2 mg by mouth every day.   duloxetine (CYMBALTA) 20 MG Cap DR Particles   Yes No   Sig: Take 20 mg by mouth 2 times a day.   gabapentin (NEURONTIN) 100 MG Cap   Yes No   Sig: Take 200 mg by mouth every bedtime.   metFORMIN ER (GLUCOPHAGE XR) 500 MG TABLET SR 24 HR   Yes No   Sig: Take 500 mg by mouth 2 times a day.   pantoprazole (PROTONIX) 20 MG tablet   Yes No   Sig: Take 20 mg by mouth every day.   promethazine (PHENERGAN) 25 MG Tab   Yes No   Sig: Take 25 mg by mouth every 6 hours as needed for Nausea/Vomiting.   rivaroxaban (XARELTO) 20 MG Tab tablet   No No   Sig: Take 1 Tab by mouth with dinner.   tramadol (ULTRAM) 50 MG Tab  Patient Yes No   Sig: Take  mg by mouth every four hours as needed for Mild Pain.      Facility-Administered Medications: None       Physical Exam  Temp:  [36.4 °C (97.6 °F)] 36.4 °C (97.6 °F)  Pulse:  [71-92] 80  Resp:  [12-16] 15  BP: (104-187)/(60-92) 142/60  SpO2:  [94 %-100 %] 98 %  Blood Pressure : (!) 142/60   Temperature: 36.4 °C (97.6 °F)   Pulse: 80   Respiration: 15   Pulse Oximetry: 98 %       Physical Exam  Vitals and nursing note reviewed.   Constitutional:       General: She is not in acute distress.     Appearance: Normal appearance.   HENT:      Head: Normocephalic and atraumatic.      Nose: Nose normal.      Mouth/Throat:      Mouth: Mucous membranes are moist.   Eyes:      Extraocular Movements: Extraocular  "movements intact.      Pupils: Pupils are equal, round, and reactive to light.   Cardiovascular:      Rate and Rhythm: Normal rate and regular rhythm.   Pulmonary:      Effort: Pulmonary effort is normal.      Breath sounds: Normal breath sounds.   Abdominal:      General: Abdomen is flat. There is no distension.      Tenderness: There is no abdominal tenderness.   Musculoskeletal:         General: No swelling or deformity. Normal range of motion.      Cervical back: Normal range of motion and neck supple.      Right lower leg: Edema present.      Left lower leg: Edema present.   Skin:     General: Skin is warm and dry.   Neurological:      General: No focal deficit present.      Mental Status: She is alert and oriented to person, place, and time.      Comments: Generally weak.  Disoriented in time.   Psychiatric:         Mood and Affect: Mood normal.         Behavior: Behavior normal.         Laboratory:  Recent Labs     04/07/24  1426   WBC 8.3   RBC 4.15*   HEMOGLOBIN 11.1*   HEMATOCRIT 35.0*   MCV 84.3   MCH 26.7*   MCHC 31.7*   RDW 53.6*   PLATELETCT 256   MPV 10.1     Recent Labs     04/07/24  1426   SODIUM 140   POTASSIUM 3.4*   CHLORIDE 103   CO2 24   GLUCOSE 104*   BUN 9   CREATININE 0.40*   CALCIUM 8.5     Recent Labs     04/07/24  1426   ALTSGPT 14   ASTSGOT 52*   ALKPHOSPHAT 146*   TBILIRUBIN 0.7   GLUCOSE 104*         No results for input(s): \"NTPROBNP\" in the last 72 hours.      No results for input(s): \"TROPONINT\" in the last 72 hours.    Imaging:  CT-LSPINE W/O PLUS RECONS   Final Result      1.  There is a transitional segment at the lumbosacral junction. For the purposes of this report the transitional segment is being defined as the L5 level which is sacralized. By this counting the L1 vertebral body is the first nonrib-bearing vertebral    body and there are rudimentary small ribs at T12. If any intervention is considered or planned, need to take into account the presence of transitional segment " anatomy to ensure appropriate level therapy.   2.  Acute compression fractures of L2 and L4.   3.  Chronic compression fracture L3.   4.  Degenerative changes with multilevel central canal and neural foraminal stenoses. This is most severe at the L3-4 level where there is severe degenerative central canal stenosis.      DX-FEMUR-2+ RIGHT   Final Result      No acute fracture detected.      CT-MAXILLOFACIAL W/O PLUS RECONS   Final Result            1. No acute maxillofacial fracture.      CT-HEAD W/O   Final Result         1. No acute intracranial abnormality. No evidence of acute intracranial hemorrhage or mass lesion.                     DX-CHEST-PORTABLE (1 VIEW)   Final Result         1. Low lung volume with hypoventilatory change and bibasilar atelectasis.      EC-ECHOCARDIOGRAM COMPLETE W/O CONT    (Results Pending)       X-Ray:  I have personally reviewed the images and compared with prior images.    Assessment/Plan:  Justification for Admission Status  I anticipate this patient will require at least two midnights for appropriate medical management, necessitating inpatient admission because L2 and L4 compression fractures    Patient will need a Telemetry bed on MEDICAL service .  The need is secondary to rhabdomyolysis, A-fib with RVR.    * Lumbar compression fracture, closed, initial encounter (HCC)- (present on admission)  Assessment & Plan  CT of L-spine without contrast: Acute compression fracture L2 and L4, chronic compression fracture L3  Multilevel central canal and neural foraminal stenosis.  Case was discussed with Dr. Porras, who recommended TLSO and outpatient follow-up with spinal surgery  Plan: Pain control  PT OT evaluation  Patient noted to be debilitated at bedside, wheelchair-bound      History of DVT (deep vein thrombosis)  Assessment & Plan  Continue Xarelto    Lactic acid acidosis  Assessment & Plan  Lactic acid 2.2  Probably secondary to rhabdomyolysis, dehydration  Given fluid  Repeat  lactic acid.    Rhabdomyolysis  Assessment & Plan  CPK 1182  Continue IV hydration  Repeat CPK    Atrial fibrillation (HCC)  Assessment & Plan  Patient is already on anticoagulation with Xarelto for history of DVT  Heart rate 93.  Will start Toprol 25 mg for heart rate control  Monitor with telemetry  Transthoracic echo    Head injury  Assessment & Plan  Presumably strike her head when she fell, has right periorbital edema  CT head negative.  CT maxillofacial negative.      Encephalopathy  Assessment & Plan  Patient appeared slow and disoriented in time.  Her baseline is unclear.  No gross motor deficit on exam.  CT head without contrast was negative  Suspicion for polypharmacy  Will hold gabapentin, hold Abilify due to rhabdomyolysis  Check bladder scan, TSH, vitamin B12, ammonia  Fall precautions  PT OT evaluation        VTE prophylaxis: therapeutic anticoagulation with Xarelto

## 2024-04-09 PROBLEM — I27.20 PULMONARY HYPERTENSION (HCC): Status: ACTIVE | Noted: 2024-04-09

## 2024-04-09 PROBLEM — J96.01 ACUTE HYPOXIC RESPIRATORY FAILURE (HCC): Status: ACTIVE | Noted: 2024-04-09

## 2024-04-09 LAB
25(OH)D3 SERPL-MCNC: 16 NG/ML (ref 30–100)
ALBUMIN SERPL BCP-MCNC: 2.5 G/DL (ref 3.2–4.9)
ALBUMIN/GLOB SERPL: 1 G/DL
ALP SERPL-CCNC: 116 U/L (ref 30–99)
ALT SERPL-CCNC: 13 U/L (ref 2–50)
ANION GAP SERPL CALC-SCNC: 8 MMOL/L (ref 7–16)
AST SERPL-CCNC: 25 U/L (ref 12–45)
BACTERIA BLD CULT: ABNORMAL
BACTERIA BLD CULT: ABNORMAL
BACTERIA UR CULT: NORMAL
BASOPHILS # BLD AUTO: 0.5 % (ref 0–1.8)
BASOPHILS # BLD: 0.03 K/UL (ref 0–0.12)
BILIRUB SERPL-MCNC: 0.3 MG/DL (ref 0.1–1.5)
BUN SERPL-MCNC: 8 MG/DL (ref 8–22)
CALCIUM ALBUM COR SERPL-MCNC: 9.1 MG/DL (ref 8.5–10.5)
CALCIUM SERPL-MCNC: 7.9 MG/DL (ref 8.5–10.5)
CHLORIDE SERPL-SCNC: 108 MMOL/L (ref 96–112)
CO2 SERPL-SCNC: 29 MMOL/L (ref 20–33)
CREAT SERPL-MCNC: 0.45 MG/DL (ref 0.5–1.4)
EOSINOPHIL # BLD AUTO: 0.38 K/UL (ref 0–0.51)
EOSINOPHIL NFR BLD: 6.7 % (ref 0–6.9)
ERYTHROCYTE [DISTWIDTH] IN BLOOD BY AUTOMATED COUNT: 55 FL (ref 35.9–50)
GFR SERPLBLD CREATININE-BSD FMLA CKD-EPI: 103 ML/MIN/1.73 M 2
GLOBULIN SER CALC-MCNC: 2.6 G/DL (ref 1.9–3.5)
GLUCOSE SERPL-MCNC: 101 MG/DL (ref 65–99)
HCT VFR BLD AUTO: 30.8 % (ref 37–47)
HGB BLD-MCNC: 9.6 G/DL (ref 12–16)
IMM GRANULOCYTES # BLD AUTO: 0.01 K/UL (ref 0–0.11)
IMM GRANULOCYTES NFR BLD AUTO: 0.2 % (ref 0–0.9)
LYMPHOCYTES # BLD AUTO: 1.66 K/UL (ref 1–4.8)
LYMPHOCYTES NFR BLD: 29.1 % (ref 22–41)
MAGNESIUM SERPL-MCNC: 1.6 MG/DL (ref 1.5–2.5)
MCH RBC QN AUTO: 26.7 PG (ref 27–33)
MCHC RBC AUTO-ENTMCNC: 31.2 G/DL (ref 32.2–35.5)
MCV RBC AUTO: 85.8 FL (ref 81.4–97.8)
MONOCYTES # BLD AUTO: 0.59 K/UL (ref 0–0.85)
MONOCYTES NFR BLD AUTO: 10.3 % (ref 0–13.4)
NEUTROPHILS # BLD AUTO: 3.04 K/UL (ref 1.82–7.42)
NEUTROPHILS NFR BLD: 53.2 % (ref 44–72)
NRBC # BLD AUTO: 0 K/UL
NRBC BLD-RTO: 0 /100 WBC (ref 0–0.2)
PLATELET # BLD AUTO: 200 K/UL (ref 164–446)
PMV BLD AUTO: 10.7 FL (ref 9–12.9)
POTASSIUM SERPL-SCNC: 3.4 MMOL/L (ref 3.6–5.5)
PROT SERPL-MCNC: 5.1 G/DL (ref 6–8.2)
PTH-INTACT SERPL-MCNC: 85.4 PG/ML (ref 14–72)
RBC # BLD AUTO: 3.59 M/UL (ref 4.2–5.4)
SIGNIFICANT IND 70042: ABNORMAL
SIGNIFICANT IND 70042: NORMAL
SITE SITE: ABNORMAL
SITE SITE: NORMAL
SODIUM SERPL-SCNC: 145 MMOL/L (ref 135–145)
SOURCE SOURCE: ABNORMAL
SOURCE SOURCE: NORMAL
WBC # BLD AUTO: 5.7 K/UL (ref 4.8–10.8)

## 2024-04-09 PROCEDURE — 80053 COMPREHEN METABOLIC PANEL: CPT

## 2024-04-09 PROCEDURE — A9270 NON-COVERED ITEM OR SERVICE: HCPCS | Performed by: INTERNAL MEDICINE

## 2024-04-09 PROCEDURE — 700102 HCHG RX REV CODE 250 W/ 637 OVERRIDE(OP): Performed by: INTERNAL MEDICINE

## 2024-04-09 PROCEDURE — 97530 THERAPEUTIC ACTIVITIES: CPT

## 2024-04-09 PROCEDURE — 82306 VITAMIN D 25 HYDROXY: CPT

## 2024-04-09 PROCEDURE — L0486 L0625 HCHG MISC ORTHO ITEM RC 0274

## 2024-04-09 PROCEDURE — 83970 ASSAY OF PARATHORMONE: CPT

## 2024-04-09 PROCEDURE — 306637 HCHG MISC ORTHO ITEM RC 0274

## 2024-04-09 PROCEDURE — 83735 ASSAY OF MAGNESIUM: CPT

## 2024-04-09 PROCEDURE — 700102 HCHG RX REV CODE 250 W/ 637 OVERRIDE(OP)

## 2024-04-09 PROCEDURE — 36415 COLL VENOUS BLD VENIPUNCTURE: CPT

## 2024-04-09 PROCEDURE — 99232 SBSQ HOSP IP/OBS MODERATE 35: CPT | Mod: GC | Performed by: INTERNAL MEDICINE

## 2024-04-09 PROCEDURE — 85025 COMPLETE CBC W/AUTO DIFF WBC: CPT

## 2024-04-09 PROCEDURE — A9270 NON-COVERED ITEM OR SERVICE: HCPCS

## 2024-04-09 PROCEDURE — 770001 HCHG ROOM/CARE - MED/SURG/GYN PRIV*

## 2024-04-09 PROCEDURE — 97110 THERAPEUTIC EXERCISES: CPT

## 2024-04-09 RX ORDER — MAGNESIUM SULFATE HEPTAHYDRATE 40 MG/ML
2 INJECTION, SOLUTION INTRAVENOUS ONCE
Status: DISCONTINUED | OUTPATIENT
Start: 2024-04-09 | End: 2024-04-10

## 2024-04-09 RX ORDER — VITAMIN B COMPLEX
1000 TABLET ORAL
Status: DISCONTINUED | OUTPATIENT
Start: 2024-04-09 | End: 2024-04-12 | Stop reason: HOSPADM

## 2024-04-09 RX ORDER — POTASSIUM CHLORIDE 20 MEQ/1
40 TABLET, EXTENDED RELEASE ORAL ONCE
Status: DISCONTINUED | OUTPATIENT
Start: 2024-04-09 | End: 2024-04-10

## 2024-04-09 RX ADMIN — DULOXETINE HYDROCHLORIDE 20 MG: 20 CAPSULE, DELAYED RELEASE ORAL at 05:33

## 2024-04-09 RX ADMIN — RIVAROXABAN 20 MG: 20 TABLET, FILM COATED ORAL at 17:31

## 2024-04-09 RX ADMIN — OXYCODONE HYDROCHLORIDE 10 MG: 10 TABLET ORAL at 19:40

## 2024-04-09 RX ADMIN — OMEPRAZOLE 20 MG: 20 CAPSULE, DELAYED RELEASE ORAL at 05:33

## 2024-04-09 RX ADMIN — NYSTATIN: 100000 POWDER TOPICAL at 05:33

## 2024-04-09 RX ADMIN — OXYCODONE 5 MG: 5 TABLET ORAL at 05:40

## 2024-04-09 RX ADMIN — DULOXETINE HYDROCHLORIDE 20 MG: 20 CAPSULE, DELAYED RELEASE ORAL at 17:31

## 2024-04-09 RX ADMIN — OXYCODONE HYDROCHLORIDE 10 MG: 10 TABLET ORAL at 15:39

## 2024-04-09 RX ADMIN — NYSTATIN: 100000 POWDER TOPICAL at 17:31

## 2024-04-09 RX ADMIN — Medication 1000 UNITS: at 12:51

## 2024-04-09 RX ADMIN — METOPROLOL SUCCINATE 25 MG: 25 TABLET, EXTENDED RELEASE ORAL at 05:33

## 2024-04-09 ASSESSMENT — PATIENT HEALTH QUESTIONNAIRE - PHQ9
SUM OF ALL RESPONSES TO PHQ QUESTIONS 1-9: 6
7. TROUBLE CONCENTRATING ON THINGS, SUCH AS READING THE NEWSPAPER OR WATCHING TELEVISION: SEVERAL DAYS
2. FEELING DOWN, DEPRESSED, IRRITABLE, OR HOPELESS: SEVERAL DAYS
4. FEELING TIRED OR HAVING LITTLE ENERGY: SEVERAL DAYS
8. MOVING OR SPEAKING SO SLOWLY THAT OTHER PEOPLE COULD HAVE NOTICED. OR THE OPPOSITE, BEING SO FIGETY OR RESTLESS THAT YOU HAVE BEEN MOVING AROUND A LOT MORE THAN USUAL: NOT AT ALL
9. THOUGHTS THAT YOU WOULD BE BETTER OFF DEAD, OR OF HURTING YOURSELF: NOT AT ALL
SUM OF ALL RESPONSES TO PHQ9 QUESTIONS 1 AND 2: 2
1. LITTLE INTEREST OR PLEASURE IN DOING THINGS: SEVERAL DAYS
6. FEELING BAD ABOUT YOURSELF - OR THAT YOU ARE A FAILURE OR HAVE LET YOURSELF OR YOUR FAMILY DOWN: NOT AL ALL
5. POOR APPETITE OR OVEREATING: SEVERAL DAYS
3. TROUBLE FALLING OR STAYING ASLEEP OR SLEEPING TOO MUCH: SEVERAL DAYS

## 2024-04-09 ASSESSMENT — COGNITIVE AND FUNCTIONAL STATUS - GENERAL
TURNING FROM BACK TO SIDE WHILE IN FLAT BAD: A LOT
SUGGESTED CMS G CODE MODIFIER MOBILITY: CM
MOBILITY SCORE: 8
MOVING TO AND FROM BED TO CHAIR: TOTAL
STANDING UP FROM CHAIR USING ARMS: TOTAL
CLIMB 3 TO 5 STEPS WITH RAILING: TOTAL
MOVING FROM LYING ON BACK TO SITTING ON SIDE OF FLAT BED: A LOT
WALKING IN HOSPITAL ROOM: TOTAL

## 2024-04-09 ASSESSMENT — PAIN DESCRIPTION - PAIN TYPE
TYPE: ACUTE PAIN

## 2024-04-09 ASSESSMENT — ENCOUNTER SYMPTOMS
CONSTITUTIONAL NEGATIVE: 1
PALPITATIONS: 0
WEAKNESS: 1
GASTROINTESTINAL NEGATIVE: 1
BACK PAIN: 1
RESPIRATORY NEGATIVE: 1
PSYCHIATRIC NEGATIVE: 1
EYES NEGATIVE: 1
FALLS: 1

## 2024-04-09 ASSESSMENT — GAIT ASSESSMENTS: GAIT LEVEL OF ASSIST: UNABLE TO PARTICIPATE

## 2024-04-09 ASSESSMENT — FIBROSIS 4 INDEX: FIB4 SCORE: 2.43

## 2024-04-09 NOTE — ASSESSMENT & PLAN NOTE
Improvement throughout hospitalization now on half liter nasal cannula.Increased oxygen requirement on admission requiring 2 to 3 L nasal cannula.  Likely secondary to atelectasis from laying on ground for 20 hours.  - Incentive spirometry as tolerated  - Continue to wean oxygen as tolerated

## 2024-04-09 NOTE — THERAPY
Physical Therapy   Daily Treatment     Patient Name: Nieves Murphy  Age:  69 y.o., Sex:  female  Medical Record #: [id number]  Today's Date: 4/9/2024     Precautions  Precautions: Fall Risk;TLSO (Thoracolumbosacral orthosis);Spinal / Back Precautions   Comments: TLSO does not fit, custom TLSO ordered    Assessment  Pt is very motivated to participate in therapies. Pt up to EOB with jhonny Barrett, the pac med representative present and multiple attempts to fit off the shelf TLSO to patient but unable. Due ot patient being non ambulatory at baseline, the off the shelf TLSO is too long in the chest portion and pushes into the patients throat. Discussion with Dr. Porras for plan for different brace and he ordered a custom TLSO to be made.   Pt was able to sit EOB for a total of 15 minutes and complete 1x10 reps of B LE exercises (hip abd/add, marches, ankle pumps, ankle circles, LAQ, and HS curls. Pt returned to bed with all needs met.     Plan    Treatment Plan Status: Continue Current Treatment Plan  Type of Treatment: Bed Mobility, Gait Training, Manual Therapy, Neuro Re-Education / Balance, Self Care / Home Evaluation, Therapeutic Activities, Therapeutic Exercise  Treatment Frequency: 4 Times per Week  Treatment Duration: Until Therapy Goals Met    DC Equipment Recommendations: Unable to determine at this time  Discharge Recommendations: Recommend post-acute placement for additional physical therapy services prior to discharge home    Objective     04/09/24 1240   Precautions   Precautions Fall Risk;TLSO (Thoracolumbosacral orthosis);Spinal / Back Precautions    Comments TLSO does not fit, custom TLSO ordered   Pain 0 - 10 Group   Therapist Pain Assessment Nurse Notified;Post Activity Pain Same as Prior to Activity;During Activity  (not rated)   Cognition    Level of Consciousness Alert   Balance   Sitting Balance (Static) Good   Sitting Balance (Dynamic) Fair +   Weight Shift Sitting Fair   Skilled Intervention  Verbal Cuing;Tactile Cuing;Compensatory Strategies   Comments educated on log roll   Bed Mobility    Supine to Sit Minimal Assist   Sit to Supine Moderate Assist   Rolling Contact Guard Assist   Skilled Intervention Verbal Cuing;Tactile Cuing;Compensatory Strategies   Gait Analysis   Gait Level Of Assist Unable to Participate   Comments deferred standing due to poor fitting brace   Functional Mobility   Sit to Stand Unable to Participate   Mobility EOB 15 minutes   Skilled Intervention Compensatory Strategies;Verbal Cuing;Tactile Cuing   Comments Nena from [facility] present to attempt to complete brace fitting, unable to adjust off the shelf TLSO to be able to fit pt appropriately. Phone call after session with Dr. Porras and ordered a custom TLSO for patient.   6 Clicks Assessment - How much HELP from from another person do you currently need... (If the patient hasn't done an activity recently, how much help from another person do you think he/she would need if he/she tried?)   Turning from your back to your side while in a flat bed without using bedrails? 2   Moving from lying on your back to sitting on the side of a flat bed without using bedrails? 2   Moving to and from a bed to a chair (including a wheelchair)? 1   Standing up from a chair using your arms (e.g., wheelchair, or bedside chair)? 1   Walking in hospital room? 1   Climbing 3-5 steps with a railing? 1   6 clicks Mobility Score 8   Activity Tolerance   Sitting Edge of Bed 15 min   Short Term Goals    Short Term Goal # 1 Pt will roll left <> right with use of rail at S level by tx 6   Goal Outcome # 1 Progressing as expected   Short Term Goal # 2 Pt will perform side <> sit in bed with S bytx 6   Goal Outcome # 2 Progressing as expected   Short Term Goal # 3 Pt will perfrorm sit to stand from bed to [place] steady with Min A by tx 2   Goal Outcome # 3 Goal not met   Short Term Goal # 4 Pt will perform sit to stand from chair to FWW at Min A level by tx 6    Goal Outcome # 4 Goal not met   Short Term Goal # 5 pt will perform BLE exercises in sitting and supine x 10 reps with S by tx 2   Goal Outcome # 5 Goal met   Education Group   Education Provided Role of Physical Therapist;Brace Wear and Care   Role of Physical Therapist Patient Response Patient;Acceptance;Explanation;Verbal Demonstration   Brace Wear & Care Patient Response Patient;Acceptance;Explanation;Demonstration;Verbal Demonstration;Action Demonstration   Physical Therapy Treatment Plan   Physical Therapy Treatment Plan Continue Current Treatment Plan   Anticipated Discharge Equipment and Recommendations   DC Equipment Recommendations Unable to determine at this time   Discharge Recommendations Recommend post-acute placement for additional physical therapy services prior to discharge home   Interdisciplinary Plan of Care Collaboration   IDT Collaboration with  Nursing;Physician   Patient Position at End of Therapy In Bed;Call Light within Reach;Phone within Reach;Tray Table within Reach   Collaboration Comments Updates on tx, order placed for custom TLSO   Session Information   Date / Session Number  4/9-2(2/4, 4/14)

## 2024-04-09 NOTE — ASSESSMENT & PLAN NOTE
Elevated pulmonary pressure noted on echocardiogram 62 mmHg RVSP  - Outpatient sleep medicine referral likely to benefit from CPAP

## 2024-04-09 NOTE — CARE PLAN
The patient is Stable - Low risk of patient condition declining or worsening    Shift Goals  Clinical Goals: q2 turns, pain management  Patient Goals: rest  Family Goals: JANETH      Problem: Knowledge Deficit - Standard  Goal: Patient and family/care givers will demonstrate understanding of plan of care, disease process/condition, diagnostic tests and medications  Outcome: Progressing    NOTES: Updated patient on POC. Answered patient's questions regarding diagnosis. Patient verbalized understanding.      Problem: Skin Integrity  Goal: Skin integrity is maintained or improved  Outcome: Progressing    NOTES: Made sure patient's linens are kept clean and dry. Applied barrier cream and interfold in the pannus and pelvic area. Cleansed wound in the arms and wound left open to air.

## 2024-04-09 NOTE — DISCHARGE PLANNING
Called [facility], [place] with my direct line.    1009  Spoke with admissions, said they have not got to rehab referral quite yet. Will update referral when she gets a chance.

## 2024-04-09 NOTE — DISCHARGE PLANNING
Spoke with Ann at [facility], requested for her to initiate insurance auth for pt. Pt discussed in IDT rounds.

## 2024-04-09 NOTE — DIETARY
"Nutrition services: Day 2 of admit.  Nieves Murphy is a 69 y.o. female with admitting DX of lumbar compression fracture, closed, initial encounter.    Consult received for wt loss (100 lbs in 3 months), poor PO per admit screen. Met with pt at bedside. Pt appeared adequately nourished. Pt reports a fair to low appetite, stating she does not like some of the food she has received from the kitchen. Pt does not follow a consistent CHO diet at home, she requested regular soda. Pt stated she will consume Boost oral supplement at home when she does not feel like eating, she does not consume Glucerna. Pt reports a 100 lb wt loss d/t not eating well for a \"few months\". She did not provide a UBW. Pt with BMI >40 (=Body mass index is 52.94 kg/m².), Class III obesity. Weight loss counseling not appropriate in acute care setting.    Assessment:  Height: 157.5 cm (5' 2\")  Weight: (!) 131 kg (289 lb 7.4 oz)  Body mass index is 52.94 kg/m²., BMI classification: obese class III  Diet/Intake: Consistent CHO (Diabetic); PO % for one meal thus far    Evaluation:   Admitted after fall.   PMH: abdominal surgery; vaginal hysterectomy scope total, anterior and posterior repair, enterocele repair, vaginal suspension, bladder sling female, cystoscopy, anterior and posterior repair, enterocele repair, vaginal suspension; and bladder sling female., type 2 diabetes.  Potassium 3.4, Glucose 101, Creatinine 0.45, ALk phos 116; no recent A1c  No recent wt hx to trend.    Malnutrition Risk: Does not meet criteria per ASPEN guidelines at this time.    Recommendations/Plan:  Diet per MD.  Encourage intake of meals.  Document intake of all meals as % taken in ADLs to provide interdisciplinary communication across all shifts.   Monitor weight.  Nutrition rep will continue to see patient for ongoing meal and snack preferences.     RD will follow per dept guidelines.       "

## 2024-04-09 NOTE — CARE PLAN
The patient is Stable - Low risk of patient condition declining or worsening    Shift Goals  Clinical Goals: q2 turns, pain management  Patient Goals: comfort, rest  Family Goals: JANETH    Progress made toward(s) clinical / shift goals:    Problem: Pain - Standard  Goal: Alleviation of pain or a reduction in pain to the patient’s comfort goal  Description: Target End Date:  Prior to discharge or change in level of care    Document on Vitals flowsheet    1.  Document pain using the appropriate pain scale per order or unit policy  2.  Educate and implement non-pharmacologic comfort measures (i.e. relaxation, distraction, massage, cold/heat therapy, etc.)  3.  Pain management medications as ordered  4.  Reassess pain after pain med administration per policy  5.  If opiods administered assess patient's response to pain medication is appropriate per POSS sedation scale  6.  Follow pain management plan developed in collaboration with patient and interdisciplinary team (including palliative care or pain specialists if applicable)  Outcome: Progressing     Problem: Knowledge Deficit - Standard  Goal: Patient and family/care givers will demonstrate understanding of plan of care, disease process/condition, diagnostic tests and medications  Description: Target End Date:  1-3 days or as soon as patient condition allows    Document in Patient Education    1.  Patient and family/caregiver oriented to unit, equipment, visitation policy and means for communicating concern  2.  Complete/review Learning Assessment  3.  Assess knowledge level of disease process/condition, treatment plan, diagnostic tests and medications  4.  Explain disease process/condition, treatment plan, diagnostic tests and medications  Outcome: Progressing       Patient is not progressing towards the following goals:      Problem: Skin Integrity  Goal: Skin integrity is maintained or improved  Description: Target End Date:  Prior to discharge or change in level of  care    Document interventions on Skin Risk/Mayito flowsheet groups and corresponding LDA    1.  Assess and monitor skin integrity, appearance and/or temperature  2.  Assess risk factors for impaired skin integrity and/or pressures ulcers  3.  Implement precautions to protect skin integrity in collaboration with interdisciplinary team  4.  Implement pressure ulcer prevention protocol if at risk for skin breakdown  5.  Confirm wound care consult if at risk for skin breakdown  6.  Ensure patient use of pressure relieving devices  (Low air loss bed, waffle overlay, heel protectors, ROHO cushion, etc)  Outcome: Not Met

## 2024-04-09 NOTE — DISCHARGE PLANNING
Case Management Discharge Planning    Admission Date: 4/7/2024  GMLOS: 3.5  ALOS: 2    6-Clicks ADL Score: 14  6-Clicks Mobility Score: 8  PT and/or OT Eval ordered: Yes  Post-acute Referrals Ordered: Yes  Post-acute Choice Obtained: Yes  Has referral(s) been sent to post-acute provider:  Yes      Anticipated Discharge Dispo: Discharge Disposition: D/T to SNF with Medicare cert in anticipation of skilled care (03)    DME Needed: No    Action(s) Taken: LSW met with pt at bedside to provide update. Currently no accepting IRF and pt is medically clear. Pt agreeable with going to [facility] once they obtain insurance auth.    Escalations Completed: None    Medically Clear: Yes    Next Steps: Care coordination will f/u with [facility] pending auth    Barriers to Discharge: Pending Placement and Pending Insurance Authorization    Is the patient up for discharge tomorrow: No

## 2024-04-09 NOTE — PROGRESS NOTES
[facility] Daily Progress Note    Date of Service  4/9/2024    [facility] Team: R IM Blue Team   Attending: Fredy Delcid M.d.  Senior Resident: Dr. Kmi  Intern:  Dr. Her  Contact Number: [phone]    Chief Complaint  Nieves Murphy is a 69 y.o. female admitted 4/7/2024 with new onset atrial fibrillation, history of left lower extremity DVT on rivaroxaban 20 mg daily, type 2 diabetes on metformin presented after ground-level fall.    Hospital Course  Nieevs Murphy is a 69 y.o. female admitted 4/7/2024 with new onset atrial fibrillation, history of left lower extremity DVT on rivaroxaban 20 mg daily, type 2 diabetes on metformin presented after ground-level fall.  Patient was transferring from recliner to wheelchair that utilizes daily for chronic weakness.  She fell to ground and remained down for 20 hours.  Imaging on admission notable for acute L3 and L4 compression fractures with chronic L3 compression fracture.  Neurosurgery consulted on admission recommended TLSO brace when out of bed and outpatient follow-up in 6 weeks.    Interval Problem Update  No events reported overnight.  Patient endorses persistent right-sided pain that is persistent 7 out of 10.  Evaluated by PMR, will continue to follow peripherally while hospitalized to see if gains enough strength to benefit from inpatient rehab.  Discussed new diagnosis of atrial fibrillation recommend continued lifelong anticoagulation and to continue Xarelto 20 mg daily.  She endorses palpitations the past several months.  Echocardiogram notable for HFpEF with dilated left atrium and pulmonary hypertension possibly in setting of undiagnosed sleep apnea.  Blood culture 1 out of 4 growing coagulase negative staph likely contaminant will continue to monitor.  Currently medically stable pending SNF versus inpatient rehab placement.    I have discussed this patient's plan of care and discharge plan at IDT rounds today with Case  Management, Nursing, Nursing leadership, and other members of the IDT team.    Consultants/Specialty  neurosurgery    Code Status  Full Code    Disposition  The patient is medically cleared for discharge to home or a post-acute facility.  Anticipate discharge to: skilled nursing facility    I have placed the appropriate orders for post-discharge needs.    Review of Systems  Review of Systems   Constitutional: Negative.    HENT: Negative.     Eyes: Negative.    Respiratory: Negative.     Cardiovascular:  Negative for chest pain and palpitations.   Gastrointestinal: Negative.    Genitourinary: Negative.    Musculoskeletal:  Positive for back pain, falls and joint pain.   Skin: Negative.    Neurological:  Positive for weakness.   Endo/Heme/Allergies: Negative.    Psychiatric/Behavioral: Negative.          Physical Exam  Temp:  [36.3 °C (97.3 °F)-36.8 °C (98.2 °F)] 36.3 °C (97.3 °F)  Pulse:  [69-80] 69  Resp:  [18] 18  BP: ()/(53-82) 118/66  SpO2:  [92 %-97 %] 97 %    Physical Exam  Constitutional:       Appearance: Normal appearance. She is obese.   HENT:      Head: Normocephalic and atraumatic.      Nose: No congestion.   Eyes:      Extraocular Movements: Extraocular movements intact.      Conjunctiva/sclera: Conjunctivae normal.      Pupils: Pupils are equal, round, and reactive to light.   Cardiovascular:      Rate and Rhythm: Normal rate. Rhythm irregular.      Pulses: Normal pulses.      Heart sounds: Normal heart sounds.   Pulmonary:      Effort: Pulmonary effort is normal.      Breath sounds: Normal breath sounds.   Abdominal:      General: There is no distension.      Palpations: Abdomen is soft. There is no mass.      Tenderness: There is no abdominal tenderness.   Musculoskeletal:      Right lower leg: No edema.      Left lower leg: No edema.   Neurological:      General: No focal deficit present.      Mental Status: She is alert and oriented to person, place, and time.   Psychiatric:         Mood and  Affect: Mood normal.         Behavior: Behavior normal.         Fluids    Intake/Output Summary (Last 24 hours) at 4/9/2024 1205  Last data filed at 4/9/2024 0800  Gross per 24 hour   Intake 100 ml   Output 400 ml   Net -300 ml       Laboratory  Recent Labs     04/07/24  1426 04/08/24  0128 04/09/24  0118   WBC 8.3 6.1 5.7   RBC 4.15* 3.71* 3.59*   HEMOGLOBIN 11.1* 9.7* 9.6*   HEMATOCRIT 35.0* 31.0* 30.8*   MCV 84.3 83.6 85.8   MCH 26.7* 26.1* 26.7*   MCHC 31.7* 31.3* 31.2*   RDW 53.6* 53.1* 55.0*   PLATELETCT 256 198 200   MPV 10.1 9.8 10.7     Recent Labs     04/07/24  1426 04/08/24  0128 04/09/24  0118   SODIUM 140 143 145   POTASSIUM 3.4* 3.1* 3.4*   CHLORIDE 103 107 108   CO2 24 29 29   GLUCOSE 104* 116* 101*   BUN 9 8 8   CREATININE 0.40* 0.39* 0.45*   CALCIUM 8.5 7.9* 7.9*                   Imaging  EC-ECHOCARDIOGRAM COMPLETE W/ CONT   Final Result      CT-LSPINE W/O PLUS RECONS   Final Result      1.  There is a transitional segment at the lumbosacral junction. For the purposes of this report the transitional segment is being defined as the L5 level which is sacralized. By this counting the L1 vertebral body is the first nonrib-bearing vertebral    body and there are rudimentary small ribs at T12. If any intervention is considered or planned, need to take into account the presence of transitional segment anatomy to ensure appropriate level therapy.   2.  Acute compression fractures of L2 and L4.   3.  Chronic compression fracture L3.   4.  Degenerative changes with multilevel central canal and neural foraminal stenoses. This is most severe at the L3-4 level where there is severe degenerative central canal stenosis.      DX-FEMUR-2+ RIGHT   Final Result      No acute fracture detected.      CT-MAXILLOFACIAL W/O PLUS RECONS   Final Result            1. No acute maxillofacial fracture.      CT-HEAD W/O   Final Result         1. No acute intracranial abnormality. No evidence of acute intracranial hemorrhage or  mass lesion.                     DX-CHEST-PORTABLE (1 VIEW)   Final Result         1. Low lung volume with hypoventilatory change and bibasilar atelectasis.           Assessment/Plan  Problem Representation:    * Lumbar compression fracture, closed, initial encounter ([facility])- (present on admission)  Assessment & Plan  CT of L-spine without contrast: Acute compression fracture L2 and L4, chronic compression fracture L3  Multilevel central canal and neural foraminal stenosis.  Likely after ground-level fall when transferring from recliner to ground.  -Neurosurgery consulted on admission recommended TLSO brace when out of bed  -Follow-up with neurosurgery in 6 weeks  -As needed oxycodone ordered  -Lidocaine patch  -PT/OT appreciate recommendations.  Ambulate as tolerated  -PMR consult for consideration for acute rehab facility  -Fall precautions  - PTH elevated in setting of normocalcemia with decreased vitamin D.  Will supplement vitamin D and continue to monitor likely benefit from DEXA scan outpatient.  Likely reassess PTH and calcium level in next few months with primary care provider.  -Cholecalciferol 1000 units daily  -Corrected calcium WNL    Acute hypoxic respiratory failure (HCC)  Assessment & Plan  Creased oxygen requirement on admission requiring 2 to 3 L nasal cannula.  Likely secondary to atelectasis from laying on ground for 20 hours.  - Incentive spirometry as tolerated  - Continue to wean oxygen as tolerated    Pulmonary hypertension (HCC)  Assessment & Plan  Elevated pulmonary pressure noted on echocardiogram 62 mmHg RVSP  - Outpatient sleep medicine referral likely to benefit from CPAP    GERD (gastroesophageal reflux disease)  Assessment & Plan  - Continue home omeprazole 20 mg daily    Mood disorder ([facility])  Assessment & Plan  - Continue home duloxetine  - Holding home Abilify and gabapentin  - Holding home Xanax prescribed as needed    Elevated CPK  Assessment & Plan  CPK elevated to 1180 on admission  likely from being on the ground for 20 hours at home.  Improved to 800 on recheck  - Received NS continuous infusion on admission, discontinued today  - Recommend oral intake as tolerated  - Holding Abilify and gabapentin    History of DVT (deep vein thrombosis)- (present on admission)  Assessment & Plan  Continue Xarelto    Lactic acid acidosis- (present on admission)  Assessment & Plan  Resolved-lactic acid 2.2 on admission likely secondary to dehydration and elevated CPK improved with fluids.      Atrial fibrillation (HCC)  Assessment & Plan  New onset A-fib on admission.  Heart rate 92 on admission and beta-blocker was initiated.  Toprol 25 mg started on admission, rates now 60s to 80s  Patient already on therapeutic anticoagulation with rivaroxaban 20 mg daily that she is on for history of DVT  Transthoracic echo ordered notable for left atrial dilation along with pulmonary hypertension  Remove telemetry as rates are stable  TSH WNL on admission    Head injury  Assessment & Plan  Presumably strike her head when she fell, head right periorbital edema on admission.  CT head negative.  CT maxillofacial negative.           VTE prophylaxis: therapeutic anticoagulation with 20mg xarelto    I have performed a physical exam and reviewed and updated ROS and Plan today (4/9/2024). In review of yesterday's note (4/8/2024), there are no changes except as documented above.

## 2024-04-10 PROBLEM — K59.00 CONSTIPATION: Status: ACTIVE | Noted: 2024-04-10

## 2024-04-10 PROBLEM — M94.0 COSTOCHONDRITIS: Status: ACTIVE | Noted: 2024-04-10

## 2024-04-10 LAB
ALBUMIN SERPL BCP-MCNC: 2.4 G/DL (ref 3.2–4.9)
BUN SERPL-MCNC: 10 MG/DL (ref 8–22)
CALCIUM ALBUM COR SERPL-MCNC: 9.4 MG/DL (ref 8.5–10.5)
CALCIUM SERPL-MCNC: 8.1 MG/DL (ref 8.5–10.5)
CHLORIDE SERPL-SCNC: 106 MMOL/L (ref 96–112)
CO2 SERPL-SCNC: 30 MMOL/L (ref 20–33)
CREAT SERPL-MCNC: 0.47 MG/DL (ref 0.5–1.4)
EKG IMPRESSION: NORMAL
ERYTHROCYTE [DISTWIDTH] IN BLOOD BY AUTOMATED COUNT: 56.3 FL (ref 35.9–50)
GFR SERPLBLD CREATININE-BSD FMLA CKD-EPI: 102 ML/MIN/1.73 M 2
GLUCOSE SERPL-MCNC: 97 MG/DL (ref 65–99)
HCT VFR BLD AUTO: 32.8 % (ref 37–47)
HGB BLD-MCNC: 9.6 G/DL (ref 12–16)
MAGNESIUM SERPL-MCNC: 1.7 MG/DL (ref 1.5–2.5)
MCH RBC QN AUTO: 25.8 PG (ref 27–33)
MCHC RBC AUTO-ENTMCNC: 29.3 G/DL (ref 32.2–35.5)
MCV RBC AUTO: 88.2 FL (ref 81.4–97.8)
PHOSPHATE SERPL-MCNC: 3.9 MG/DL (ref 2.5–4.5)
PLATELET # BLD AUTO: 211 K/UL (ref 164–446)
PMV BLD AUTO: 10.1 FL (ref 9–12.9)
POTASSIUM SERPL-SCNC: 3.7 MMOL/L (ref 3.6–5.5)
RBC # BLD AUTO: 3.72 M/UL (ref 4.2–5.4)
SODIUM SERPL-SCNC: 143 MMOL/L (ref 135–145)
WBC # BLD AUTO: 5.4 K/UL (ref 4.8–10.8)

## 2024-04-10 PROCEDURE — A9270 NON-COVERED ITEM OR SERVICE: HCPCS | Mod: JZ

## 2024-04-10 PROCEDURE — 770001 HCHG ROOM/CARE - MED/SURG/GYN PRIV*

## 2024-04-10 PROCEDURE — 80069 RENAL FUNCTION PANEL: CPT

## 2024-04-10 PROCEDURE — 99232 SBSQ HOSP IP/OBS MODERATE 35: CPT | Mod: GC | Performed by: INTERNAL MEDICINE

## 2024-04-10 PROCEDURE — 36415 COLL VENOUS BLD VENIPUNCTURE: CPT

## 2024-04-10 PROCEDURE — 85027 COMPLETE CBC AUTOMATED: CPT

## 2024-04-10 PROCEDURE — 700111 HCHG RX REV CODE 636 W/ 250 OVERRIDE (IP): Mod: JZ

## 2024-04-10 PROCEDURE — 97602 WOUND(S) CARE NON-SELECTIVE: CPT

## 2024-04-10 PROCEDURE — 700102 HCHG RX REV CODE 250 W/ 637 OVERRIDE(OP): Performed by: INTERNAL MEDICINE

## 2024-04-10 PROCEDURE — A9270 NON-COVERED ITEM OR SERVICE: HCPCS | Performed by: INTERNAL MEDICINE

## 2024-04-10 PROCEDURE — 700102 HCHG RX REV CODE 250 W/ 637 OVERRIDE(OP): Mod: JZ

## 2024-04-10 PROCEDURE — 83735 ASSAY OF MAGNESIUM: CPT

## 2024-04-10 PROCEDURE — 93005 ELECTROCARDIOGRAM TRACING: CPT

## 2024-04-10 PROCEDURE — 93010 ELECTROCARDIOGRAM REPORT: CPT | Performed by: INTERNAL MEDICINE

## 2024-04-10 PROCEDURE — 700101 HCHG RX REV CODE 250

## 2024-04-10 RX ORDER — POTASSIUM CHLORIDE 20 MEQ/1
40 TABLET, EXTENDED RELEASE ORAL ONCE
Status: COMPLETED | OUTPATIENT
Start: 2024-04-10 | End: 2024-04-10

## 2024-04-10 RX ORDER — MAGNESIUM SULFATE HEPTAHYDRATE 40 MG/ML
2 INJECTION, SOLUTION INTRAVENOUS ONCE
Status: COMPLETED | OUTPATIENT
Start: 2024-04-10 | End: 2024-04-10

## 2024-04-10 RX ORDER — LIDOCAINE 4 G/G
1 PATCH TOPICAL EVERY 24 HOURS
Status: DISCONTINUED | OUTPATIENT
Start: 2024-04-10 | End: 2024-04-12 | Stop reason: HOSPADM

## 2024-04-10 RX ADMIN — MAGNESIUM HYDROXIDE 30 ML: 1200 LIQUID ORAL at 14:16

## 2024-04-10 RX ADMIN — DULOXETINE HYDROCHLORIDE 20 MG: 20 CAPSULE, DELAYED RELEASE ORAL at 16:38

## 2024-04-10 RX ADMIN — SENNOSIDES AND DOCUSATE SODIUM 2 TABLET: 50; 8.6 TABLET ORAL at 16:42

## 2024-04-10 RX ADMIN — RIVAROXABAN 20 MG: 20 TABLET, FILM COATED ORAL at 16:38

## 2024-04-10 RX ADMIN — LIDOCAINE 1 PATCH: 4 PATCH TOPICAL at 14:16

## 2024-04-10 RX ADMIN — POTASSIUM CHLORIDE 40 MEQ: 1500 TABLET, EXTENDED RELEASE ORAL at 10:19

## 2024-04-10 RX ADMIN — NYSTATIN: 100000 POWDER TOPICAL at 05:30

## 2024-04-10 RX ADMIN — NYSTATIN: 100000 POWDER TOPICAL at 16:39

## 2024-04-10 RX ADMIN — OXYCODONE HYDROCHLORIDE 10 MG: 10 TABLET ORAL at 19:28

## 2024-04-10 RX ADMIN — OMEPRAZOLE 20 MG: 20 CAPSULE, DELAYED RELEASE ORAL at 05:30

## 2024-04-10 RX ADMIN — MAGNESIUM SULFATE HEPTAHYDRATE 2 G: 2 INJECTION, SOLUTION INTRAVENOUS at 10:31

## 2024-04-10 RX ADMIN — METOPROLOL SUCCINATE 25 MG: 25 TABLET, EXTENDED RELEASE ORAL at 05:29

## 2024-04-10 RX ADMIN — Medication 1000 UNITS: at 14:16

## 2024-04-10 RX ADMIN — DULOXETINE HYDROCHLORIDE 20 MG: 20 CAPSULE, DELAYED RELEASE ORAL at 05:29

## 2024-04-10 RX ADMIN — POLYETHYLENE GLYCOL 3350 1 PACKET: 17 POWDER, FOR SOLUTION ORAL at 10:20

## 2024-04-10 ASSESSMENT — PAIN DESCRIPTION - PAIN TYPE
TYPE: ACUTE PAIN
TYPE: ACUTE PAIN

## 2024-04-10 ASSESSMENT — ENCOUNTER SYMPTOMS
PALPITATIONS: 0
PSYCHIATRIC NEGATIVE: 1
RESPIRATORY NEGATIVE: 1
GASTROINTESTINAL NEGATIVE: 1
FALLS: 1
EYES NEGATIVE: 1
BACK PAIN: 1
WEAKNESS: 1
CONSTITUTIONAL NEGATIVE: 1

## 2024-04-10 ASSESSMENT — FIBROSIS 4 INDEX: FIB4 SCORE: 2.3

## 2024-04-10 NOTE — PROGRESS NOTES
[facility] Internal Medicine Daily Progress Note    Date of Service  4/10/2024    R Team: R IM Blue Team   Attending: Fredy Delcid M.d.  Senior Resident: Dr. Kim  Intern:  Dr. Her  Contact Number: [phone]    Chief Complaint  Nieves Murphy is a 69 y.o. female admitted 4/7/2024 with new onset atrial fibrillation, history of left lower extremity DVT on rivaroxaban 20 mg daily, type 2 diabetes on metformin presented after ground-level fall.    Hospital Course  Nieves Murphy is a 69 y.o. female admitted 4/7/2024 with new onset atrial fibrillation, history of left lower extremity DVT on rivaroxaban 20 mg daily, type 2 diabetes on metformin presented after ground-level fall.  Patient was transferring from recliner to wheelchair that utilizes daily for chronic weakness.  She fell to ground and remained down for 20 hours.  Imaging on admission notable for acute L3 and L4 compression fractures with chronic L3 compression fracture.  Neurosurgery consulted on admission recommended TLSO brace when out of bed and outpatient follow-up in 6 weeks.    Interval Problem Update  No events reported overnight.  Patient used to endorse right-sided abdominal pain, she states this is getting worse.  Does have tenderness to palpation right-sided rib.  EKG ordered without ST changes.  She has not had a bowel movement since admission, given MiraLAX without result and will give milk of magnesia for bowel movement.  Patient evaluated by inpatient rehab facility although is too weak and is currently medically stable pending transfer to SNF.  Updated with plan of care all questions answered    I have discussed this patient's plan of care and discharge plan at IDT rounds today with Case Management, Nursing, Nursing leadership, and other members of the IDT team.    Consultants/Specialty  neurosurgery    Code Status  Full Code    Disposition  The patient is medically cleared for discharge to home or a post-acute  facility.  Anticipate discharge to: skilled nursing facility    I have placed the appropriate orders for post-discharge needs.    Review of Systems  Review of Systems   Constitutional: Negative.    HENT: Negative.     Eyes: Negative.    Respiratory: Negative.     Cardiovascular:  Negative for chest pain and palpitations.   Gastrointestinal: Negative.    Genitourinary: Negative.    Musculoskeletal:  Positive for back pain, falls and joint pain.   Skin: Negative.    Neurological:  Positive for weakness.   Endo/Heme/Allergies: Negative.    Psychiatric/Behavioral: Negative.          Physical Exam  Temp:  [36.3 °C (97.3 °F)-36.5 °C (97.7 °F)] 36.3 °C (97.3 °F)  Pulse:  [57-77] 69  Resp:  [17-18] 17  BP: (102-123)/(63-76) 123/76  SpO2:  [87 %-98 %] 87 %    Physical Exam  Constitutional:       Appearance: Normal appearance. She is obese.   HENT:      Head: Normocephalic and atraumatic.      Nose: No congestion.   Eyes:      Extraocular Movements: Extraocular movements intact.      Conjunctiva/sclera: Conjunctivae normal.      Pupils: Pupils are equal, round, and reactive to light.   Cardiovascular:      Rate and Rhythm: Normal rate. Rhythm irregular.      Pulses: Normal pulses.      Heart sounds: Normal heart sounds.   Pulmonary:      Effort: Pulmonary effort is normal.      Breath sounds: Normal breath sounds.   Abdominal:      General: There is no distension.      Palpations: Abdomen is soft. There is no mass.      Tenderness: There is abdominal tenderness (rlq).   Musculoskeletal:         General: Tenderness (tenderness palpation right rib) present.      Right lower leg: No edema.      Left lower leg: No edema.   Neurological:      General: No focal deficit present.      Mental Status: She is alert and oriented to person, place, and time.   Psychiatric:         Mood and Affect: Mood normal.         Behavior: Behavior normal.         Fluids    Intake/Output Summary (Last 24 hours) at 4/10/2024 1350  Last data filed at  4/10/2024 0800  Gross per 24 hour   Intake 580 ml   Output 200 ml   Net 380 ml       Laboratory  Recent Labs     04/08/24  0128 04/09/24  0118 04/10/24  0146   WBC 6.1 5.7 5.4   RBC 3.71* 3.59* 3.72*   HEMOGLOBIN 9.7* 9.6* 9.6*   HEMATOCRIT 31.0* 30.8* 32.8*   MCV 83.6 85.8 88.2   MCH 26.1* 26.7* 25.8*   MCHC 31.3* 31.2* 29.3*   RDW 53.1* 55.0* 56.3*   PLATELETCT 198 200 211   MPV 9.8 10.7 10.1     Recent Labs     04/08/24 0128 04/09/24  0118 04/10/24  0146   SODIUM 143 145 143   POTASSIUM 3.1* 3.4* 3.7   CHLORIDE 107 108 106   CO2 29 29 30   GLUCOSE 116* 101* 97   BUN 8 8 10   CREATININE 0.39* 0.45* 0.47*   CALCIUM 7.9* 7.9* 8.1*                   Imaging  EC-ECHOCARDIOGRAM COMPLETE W/ CONT   Final Result      CT-LSPINE W/O PLUS RECONS   Final Result      1.  There is a transitional segment at the lumbosacral junction. For the purposes of this report the transitional segment is being defined as the L5 level which is sacralized. By this counting the L1 vertebral body is the first nonrib-bearing vertebral    body and there are rudimentary small ribs at T12. If any intervention is considered or planned, need to take into account the presence of transitional segment anatomy to ensure appropriate level therapy.   2.  Acute compression fractures of L2 and L4.   3.  Chronic compression fracture L3.   4.  Degenerative changes with multilevel central canal and neural foraminal stenoses. This is most severe at the L3-4 level where there is severe degenerative central canal stenosis.      DX-FEMUR-2+ RIGHT   Final Result      No acute fracture detected.      CT-MAXILLOFACIAL W/O PLUS RECONS   Final Result            1. No acute maxillofacial fracture.      CT-HEAD W/O   Final Result         1. No acute intracranial abnormality. No evidence of acute intracranial hemorrhage or mass lesion.                     DX-CHEST-PORTABLE (1 VIEW)   Final Result         1. Low lung volume with hypoventilatory change and bibasilar  atelectasis.           Assessment/Plan  Problem Representation:    * Lumbar compression fracture, closed, initial encounter (HCC)- (present on admission)  Assessment & Plan  CT of L-spine without contrast: Acute compression fracture L2 and L4, chronic compression fracture L3  Multilevel central canal and neural foraminal stenosis.  Likely after ground-level fall when transferring from recliner to ground.  -Neurosurgery consulted on admission recommended TLSO brace when out of bed  -Follow-up with neurosurgery in 6 weeks  -As needed oxycodone ordered  -Lidocaine patch  -PT/OT appreciate recommendations.  Ambulate as tolerated  -PMR consult for consideration for acute rehab facility  -Fall precautions  - PTH elevated in setting of normocalcemia with decreased vitamin D.  Will supplement vitamin D and continue to monitor likely benefit from DEXA scan outpatient.  Likely reassess PTH and calcium level in next few months with primary care provider.  -Cholecalciferol 1000 units daily  -Corrected calcium WNL    Constipation  Assessment & Plan  Constipation in setting of intermittent opioid use for ongoing pain.  - MiraLAX as needed  -Milk of magnesia given 4/10    Costochondritis  Assessment & Plan  Right-sided rib pain, likely in the setting of laying down for 20 hours at home.  EKG without ST changes.  - Lidocaine patch  - Ambulate as tolerated    Acute hypoxic respiratory failure (HCC)  Assessment & Plan  Improvement throughout hospitalization now on half liter nasal cannula.Increased oxygen requirement on admission requiring 2 to 3 L nasal cannula.  Likely secondary to atelectasis from laying on ground for 20 hours.  - Incentive spirometry as tolerated  - Continue to wean oxygen as tolerated    Pulmonary hypertension (HCC)  Assessment & Plan  Elevated pulmonary pressure noted on echocardiogram 62 mmHg RVSP  - Outpatient sleep medicine referral likely to benefit from CPAP    GERD (gastroesophageal reflux  disease)  Assessment & Plan  - Continue home omeprazole 20 mg daily    Mood disorder (HCC)  Assessment & Plan  - Continue home duloxetine  - Holding home Abilify and gabapentin  - Holding home Xanax prescribed as needed    Elevated CPK  Assessment & Plan  CPK elevated to 1180 on admission likely from being on the ground for 20 hours at home.  Improved to 800 on recheck  - Received NS continuous infusion on admission, discontinued today  - Recommend oral intake as tolerated  - Holding Abilify and gabapentin    History of DVT (deep vein thrombosis)- (present on admission)  Assessment & Plan  History of left lower extremity DVT on chronic anticoagulation  Continue Xarelto    Lactic acid acidosis- (present on admission)  Assessment & Plan  Resolved-lactic acid 2.2 on admission likely secondary to dehydration and elevated CPK improved with fluids.      Atrial fibrillation (HCC)  Assessment & Plan  New onset A-fib on admission.  Heart rate 92 on admission and beta-blocker was initiated.  Toprol 25 mg started on admission, rates now 60s to 80s  Patient already on therapeutic anticoagulation with rivaroxaban 20 mg daily that she is on for history of DVT  Transthoracic echo ordered notable for left atrial dilation along with pulmonary hypertension  Remove telemetry as rates are stable  TSH WNL on admission    Head injury  Assessment & Plan  Presumably strike her head when she fell, head right periorbital edema on admission.  CT head negative.  CT maxillofacial negative.           VTE prophylaxis: therapeutic anticoagulation with 20mg xarelto    I have performed a physical exam and reviewed and updated ROS and Plan today (4/10/2024). In review of yesterday's note (4/9/2024), there are no changes except as documented above.

## 2024-04-10 NOTE — CARE PLAN
The patient is Watcher - Medium risk of patient condition declining or worsening    Shift Goals  Clinical Goals: maintain skin integrity, manage pain  Patient Goals: comfort, rest  Family Goals: JANETH    Progress made toward(s) clinical / shift goals:  laxatives given today.           Problem: Bowel Elimination  Goal: Establish and maintain regular bowel function  Outcome: Not Progressing  Note: No BM's in a few days, discussed with MD. Gave miralax and milk of mag.          Problem: Knowledge Deficit - Standard  Goal: Patient and family/care givers will demonstrate understanding of plan of care, disease process/condition, diagnostic tests and medications  Outcome: Progressing  Note: Pt educated on current POC, expected outcomes and goals, and new medications ordered. All questions and concerns have been answered at this time. MD educated pt on disease pathology and work up.

## 2024-04-10 NOTE — WOUND TEAM
[facility]  Inpatient Services  Wound and Skin Care Brief Evaluation    Admission Date: 4/7/2024     Last order of IP CONSULT TO WOUND CARE was found on 4/8/2024 from Hospital Encounter on 4/7/2024     HPI, PMH, SH: Reviewed    Chief Complaint   Patient presents with    T-5000 GLF     Pt tried to get out of her recliner last night, felt weak & fell to her knees. Friends found her on the ground this morning. Does not remember if she hit her head, however R eye appears bruised. On blood thinners for DVT, unsure which. Pt is oriented but groggy & does not fully recall falling. Pt smells of urine, is slow to respond.     Diagnosis: Lumbar compression fracture, closed, initial encounter ([facility]) [S32.000A]    Unit where seen by Wound Team: S148/00     Wound consult placed regarding Sacrum. Chart and images reviewed. This discussed with bedside RN Eula. This clinician in to assess patient. Patient pleasant and agreeable. Sacrum and heels. Non-selectively debrided with Moist warm washcloth.     No pressure injuries or advanced wound care needs identified. Wound consult completed. No further follow up unless indicated and consulted.     Moisture Associated Skin Damage 04/10/24 Buttock (Active)   First Observed Date/First Observed Time: 04/10/24 1600   Present on Original Admission: Yes  Wound Location : Buttock      Assessments 4/10/2024  4:00 PM   Wound Image     NEXT Weekly Photo (Inpatient Only) 04/17/24   Drainage Amount None   Periwound Assessment Red;Blanchable erythema   IAD Cleansing Dimethecone Wipes   Periwound Protectant Antifungal Therapy;Barrier Paste   IAD Containment Device Purewick   WOUND NURSE ONLY - Time Spent with Patient (mins) 30       PREVENTATIVE INTERVENTIONS:    Q shift Mayito - performed per nursing policy  Q shift pressure point assessments - performed per nursing policy    Surface/Positioning  Low Airloss - Currently in Place  Reposition q 2 hours - Currently in Place  TAPs Turning  system - Currently in Place    Offloading/Redistribution  Float Heels off Bed with Pillows - Currently in Place           Containment/Moisture Prevention    Dri-leslie pad - Currently in Place  Purwick/Condom Cath - Currently in Place  Barrier wipes - Currently in Place  Barrier paste - Currently in Place

## 2024-04-10 NOTE — ASSESSMENT & PLAN NOTE
Right-sided rib pain, likely in the setting of laying down for 20 hours at home.  EKG without ST changes.  - Lidocaine patch  - Ambulate as tolerated

## 2024-04-10 NOTE — DISCHARGE PLANNING
Please review the consult from Dr. Kim regarding post acute recommendations.  TCC will no longer follow.  Please reach out to myself with any questions. Currently, patient is functioning at too low of a level to qualify for IPR.  She is not expected to have a reasonable amount improvement in a reasonable amount of time using the combined approach to be able to return home to living independently due to her high risk for falls. TCC will no longer follow.  Please reach out to myself with any questions.

## 2024-04-10 NOTE — CARE PLAN
The patient is Stable - Low risk of patient condition declining or worsening    Shift Goals  Clinical Goals: maintain skin integrity, manage pain  Patient Goals: comfort, rest  Family Goals: JANETH    Progress made toward(s) clinical / shift goals:    Problem: Pain - Standard  Goal: Alleviation of pain or a reduction in pain to the patient’s comfort goal  Description: Target End Date:  Prior to discharge or change in level of care    Document on Vitals flowsheet    1.  Document pain using the appropriate pain scale per order or unit policy  2.  Educate and implement non-pharmacologic comfort measures (i.e. relaxation, distraction, massage, cold/heat therapy, etc.)  3.  Pain management medications as ordered  4.  Reassess pain after pain med administration per policy  5.  If opiods administered assess patient's response to pain medication is appropriate per POSS sedation scale  6.  Follow pain management plan developed in collaboration with patient and interdisciplinary team (including palliative care or pain specialists if applicable)  Outcome: Progressing     Problem: Skin Integrity  Goal: Skin integrity is maintained or improved  Description: Target End Date:  Prior to discharge or change in level of care    Document interventions on Skin Risk/Mayito flowsheet groups and corresponding LDA    1.  Assess and monitor skin integrity, appearance and/or temperature  2.  Assess risk factors for impaired skin integrity and/or pressures ulcers  3.  Implement precautions to protect skin integrity in collaboration with interdisciplinary team  4.  Implement pressure ulcer prevention protocol if at risk for skin breakdown  5.  Confirm wound care consult if at risk for skin breakdown  6.  Ensure patient use of pressure relieving devices  (Low air loss bed, waffle overlay, heel protectors, ROHO cushion, etc)  Outcome: Progressing     Problem: Fall Risk  Goal: Patient will remain free from falls  Description: Target End Date:  Prior  to discharge or change in level of care    Document interventions on the Delaney Mcgill Fall Risk Assessment    1.  Assess for fall risk factors  2.  Implement fall precautions  Outcome: Progressing       Patient is not progressing towards the following goals:

## 2024-04-10 NOTE — ASSESSMENT & PLAN NOTE
Constipation in setting of intermittent opioid use for ongoing pain. Continues to pass gas and active bowel movement.   - MiraLAX as needed  - Milk of magnesia given 4/10 and 4/11  - Patient declines suppository or enema currently

## 2024-04-11 PROCEDURE — A9270 NON-COVERED ITEM OR SERVICE: HCPCS

## 2024-04-11 PROCEDURE — 99232 SBSQ HOSP IP/OBS MODERATE 35: CPT | Mod: GC | Performed by: INTERNAL MEDICINE

## 2024-04-11 PROCEDURE — 770001 HCHG ROOM/CARE - MED/SURG/GYN PRIV*

## 2024-04-11 PROCEDURE — 700102 HCHG RX REV CODE 250 W/ 637 OVERRIDE(OP): Performed by: INTERNAL MEDICINE

## 2024-04-11 PROCEDURE — 700101 HCHG RX REV CODE 250: Mod: UD | Performed by: INTERNAL MEDICINE

## 2024-04-11 PROCEDURE — 700102 HCHG RX REV CODE 250 W/ 637 OVERRIDE(OP)

## 2024-04-11 PROCEDURE — A9270 NON-COVERED ITEM OR SERVICE: HCPCS | Performed by: INTERNAL MEDICINE

## 2024-04-11 RX ORDER — ALPRAZOLAM 0.25 MG/1
0.25 TABLET ORAL 3 TIMES DAILY PRN
Status: DISCONTINUED | OUTPATIENT
Start: 2024-04-11 | End: 2024-04-12 | Stop reason: HOSPADM

## 2024-04-11 RX ADMIN — OMEPRAZOLE 20 MG: 20 CAPSULE, DELAYED RELEASE ORAL at 05:08

## 2024-04-11 RX ADMIN — NYSTATIN: 100000 POWDER TOPICAL at 05:08

## 2024-04-11 RX ADMIN — SENNOSIDES AND DOCUSATE SODIUM 2 TABLET: 50; 8.6 TABLET ORAL at 17:46

## 2024-04-11 RX ADMIN — Medication 1000 UNITS: at 12:01

## 2024-04-11 RX ADMIN — LIDOCAINE 1 PATCH: 4 PATCH TOPICAL at 22:00

## 2024-04-11 RX ADMIN — METOPROLOL SUCCINATE 25 MG: 25 TABLET, EXTENDED RELEASE ORAL at 05:08

## 2024-04-11 RX ADMIN — POLYETHYLENE GLYCOL 3350 1 PACKET: 17 POWDER, FOR SOLUTION ORAL at 17:45

## 2024-04-11 RX ADMIN — RIVAROXABAN 20 MG: 20 TABLET, FILM COATED ORAL at 17:46

## 2024-04-11 RX ADMIN — ACETAMINOPHEN 650 MG: 325 TABLET ORAL at 12:01

## 2024-04-11 RX ADMIN — OXYCODONE HYDROCHLORIDE 10 MG: 10 TABLET ORAL at 19:36

## 2024-04-11 RX ADMIN — DULOXETINE HYDROCHLORIDE 20 MG: 20 CAPSULE, DELAYED RELEASE ORAL at 05:08

## 2024-04-11 RX ADMIN — DULOXETINE HYDROCHLORIDE 20 MG: 20 CAPSULE, DELAYED RELEASE ORAL at 17:46

## 2024-04-11 RX ADMIN — NYSTATIN: 100000 POWDER TOPICAL at 22:01

## 2024-04-11 ASSESSMENT — ENCOUNTER SYMPTOMS
GASTROINTESTINAL NEGATIVE: 1
RESPIRATORY NEGATIVE: 1
EYES NEGATIVE: 1
PSYCHIATRIC NEGATIVE: 1
BACK PAIN: 1
FALLS: 1
CONSTITUTIONAL NEGATIVE: 1
PALPITATIONS: 0
WEAKNESS: 1

## 2024-04-11 ASSESSMENT — PAIN DESCRIPTION - PAIN TYPE: TYPE: ACUTE PAIN

## 2024-04-11 NOTE — DISCHARGE PLANNING
Called Donna with [facility], said she will follow up with me after morning meeting about insurance auth status.    1005  Donna said no update with insurance auth, will hope for tomorrow.

## 2024-04-11 NOTE — PROGRESS NOTES
[facility] Internal Medicine Daily Progress Note    Date of Service  4/11/2024    [facility] Team: R IM Blue Team   Attending: Fredy Delcid M.d.  Senior Resident: Dr. Kim  Intern:  Dr. Her  Contact Number: [phone]    Chief Complaint  Nieves Murphy is a 69 y.o. female admitted 4/7/2024 with new onset atrial fibrillation, history of left lower extremity DVT on rivaroxaban 20 mg daily, type 2 diabetes on metformin presented after ground-level fall.    Hospital Course  Nieves Murphy is a 69 y.o. female admitted 4/7/2024 with new onset atrial fibrillation, history of left lower extremity DVT on rivaroxaban 20 mg daily, type 2 diabetes on metformin presented after ground-level fall.  Patient was transferring from recliner to wheelchair that utilizes daily for chronic weakness.  She fell to ground and remained down for 20 hours.  Imaging on admission notable for acute L3 and L4 compression fractures with chronic L3 compression fracture.  Neurosurgery consulted on admission recommended TLSO brace when out of bed and outpatient follow-up in 6 weeks.    Interval Problem Update  No events reported overnight. States pain is improved today, rates at 0/10. Still has not had bowel movement, although continues to pass gas. Declines enema or suppository, will continue to give miralax and milk of mag as tolerated. Patient otherwise medically stable to transfer to SNF once has accepting facility.    I have discussed this patient's plan of care and discharge plan at IDT rounds today with Case Management, Nursing, Nursing leadership, and other members of the IDT team.    Consultants/Specialty  neurosurgery    Code Status  Full Code    Disposition  The patient is medically cleared for discharge to home or a post-acute facility.      I have placed the appropriate orders for post-discharge needs.    Review of Systems  Review of Systems   Constitutional: Negative.    HENT: Negative.     Eyes: Negative.    Respiratory:  Negative.     Cardiovascular:  Negative for chest pain and palpitations.   Gastrointestinal: Negative.    Genitourinary: Negative.    Musculoskeletal:  Positive for back pain, falls and joint pain.   Skin: Negative.    Neurological:  Positive for weakness.   Endo/Heme/Allergies: Negative.    Psychiatric/Behavioral: Negative.          Physical Exam  Temp:  [36.2 °C (97.1 °F)-36.5 °C (97.7 °F)] 36.2 °C (97.1 °F)  Pulse:  [69-88] 88  Resp:  [17-18] 17  BP: (114-141)/(60-80) 141/80  SpO2:  [71 %-91 %] 90 %    Physical Exam  Constitutional:       Appearance: Normal appearance. She is obese.   HENT:      Head: Normocephalic and atraumatic.      Nose: No congestion.   Eyes:      Extraocular Movements: Extraocular movements intact.      Conjunctiva/sclera: Conjunctivae normal.      Pupils: Pupils are equal, round, and reactive to light.   Cardiovascular:      Rate and Rhythm: Normal rate. Rhythm irregular.      Pulses: Normal pulses.      Heart sounds: Normal heart sounds.   Pulmonary:      Effort: Pulmonary effort is normal.      Breath sounds: Normal breath sounds.   Abdominal:      General: There is no distension.      Palpations: Abdomen is soft. There is no mass.      Tenderness: There is no abdominal tenderness (rlq).   Musculoskeletal:         General: No tenderness (tenderness palpation right rib).      Right lower leg: No edema.      Left lower leg: No edema.   Neurological:      General: No focal deficit present.      Mental Status: She is alert and oriented to person, place, and time.   Psychiatric:         Mood and Affect: Mood normal.         Behavior: Behavior normal.         Fluids    Intake/Output Summary (Last 24 hours) at 4/11/2024 1326  Last data filed at 4/11/2024 1104  Gross per 24 hour   Intake 240 ml   Output 900 ml   Net -660 ml       Laboratory  Recent Labs     04/09/24  0118 04/10/24  0146   WBC 5.7 5.4   RBC 3.59* 3.72*   HEMOGLOBIN 9.6* 9.6*   HEMATOCRIT 30.8* 32.8*   MCV 85.8 88.2   MCH 26.7*  25.8*   MCHC 31.2* 29.3*   RDW 55.0* 56.3*   PLATELETCT 200 211   MPV 10.7 10.1     Recent Labs     04/09/24  0118 04/10/24  0146   SODIUM 145 143   POTASSIUM 3.4* 3.7   CHLORIDE 108 106   CO2 29 30   GLUCOSE 101* 97   BUN 8 10   CREATININE 0.45* 0.47*   CALCIUM 7.9* 8.1*                   Imaging  EC-ECHOCARDIOGRAM COMPLETE W/ CONT   Final Result      CT-LSPINE W/O PLUS RECONS   Final Result      1.  There is a transitional segment at the lumbosacral junction. For the purposes of this report the transitional segment is being defined as the L5 level which is sacralized. By this counting the L1 vertebral body is the first nonrib-bearing vertebral    body and there are rudimentary small ribs at T12. If any intervention is considered or planned, need to take into account the presence of transitional segment anatomy to ensure appropriate level therapy.   2.  Acute compression fractures of L2 and L4.   3.  Chronic compression fracture L3.   4.  Degenerative changes with multilevel central canal and neural foraminal stenoses. This is most severe at the L3-4 level where there is severe degenerative central canal stenosis.      DX-FEMUR-2+ RIGHT   Final Result      No acute fracture detected.      CT-MAXILLOFACIAL W/O PLUS RECONS   Final Result            1. No acute maxillofacial fracture.      CT-HEAD W/O   Final Result         1. No acute intracranial abnormality. No evidence of acute intracranial hemorrhage or mass lesion.                     DX-CHEST-PORTABLE (1 VIEW)   Final Result         1. Low lung volume with hypoventilatory change and bibasilar atelectasis.           Assessment/Plan  Problem Representation:    * Lumbar compression fracture, closed, initial encounter (HCC)- (present on admission)  Assessment & Plan  CT of L-spine without contrast: Acute compression fracture L2 and L4, chronic compression fracture L3  Multilevel central canal and neural foraminal stenosis.  Likely after ground-level fall when  transferring from recliner to ground.  -Neurosurgery consulted on admission recommended TLSO brace when out of bed  -Follow-up with neurosurgery in 6 weeks  -As needed oxycodone ordered  -Lidocaine patch  -PT/OT appreciate recommendations.  Ambulate as tolerated  -PMR consult for consideration for acute rehab facility  -Fall precautions  - PTH elevated in setting of normocalcemia with decreased vitamin D.  Will supplement vitamin D and continue to monitor likely benefit from DEXA scan outpatient.  Likely reassess PTH and calcium level in next few months with primary care provider.  -Cholecalciferol 1000 units daily  -Corrected calcium WNL    Constipation  Assessment & Plan  Constipation in setting of intermittent opioid use for ongoing pain. Continues to pass gas and active bowel movement.   - MiraLAX as needed  - Milk of magnesia given 4/10 and 4/11  - Patient declines suppository or enema currently    Costochondritis  Assessment & Plan  Right-sided rib pain, likely in the setting of laying down for 20 hours at home.  EKG without ST changes.  - Lidocaine patch  - Ambulate as tolerated    Acute hypoxic respiratory failure (HCC)  Assessment & Plan  Improvement throughout hospitalization now on half liter nasal cannula.Increased oxygen requirement on admission requiring 2 to 3 L nasal cannula.  Likely secondary to atelectasis from laying on ground for 20 hours.  - Incentive spirometry as tolerated  - Continue to wean oxygen as tolerated    Pulmonary hypertension (HCC)  Assessment & Plan  Elevated pulmonary pressure noted on echocardiogram 62 mmHg RVSP  - Outpatient sleep medicine referral likely to benefit from CPAP    GERD (gastroesophageal reflux disease)  Assessment & Plan  - Continue home omeprazole 20 mg daily    Mood disorder (HCC)  Assessment & Plan  - Continue home duloxetine  - Holding home Abilify and gabapentin  - Holding home Xanax prescribed as needed    Elevated CPK  Assessment & Plan  CPK elevated to  1180 on admission likely from being on the ground for 20 hours at home.  Improved to 800 on recheck  - Received NS continuous infusion on admission, discontinued today  - Recommend oral intake as tolerated  - Holding Abilify and gabapentin    History of DVT (deep vein thrombosis)- (present on admission)  Assessment & Plan  History of left lower extremity DVT on chronic anticoagulation  Continue Xarelto    Lactic acid acidosis- (present on admission)  Assessment & Plan  Resolved-lactic acid 2.2 on admission likely secondary to dehydration and elevated CPK improved with fluids.      Atrial fibrillation (HCC)  Assessment & Plan  New onset A-fib on admission.  Heart rate 92 on admission and beta-blocker was initiated.  Toprol 25 mg started on admission, rates now 60s to 80s  Patient already on therapeutic anticoagulation with rivaroxaban 20 mg daily that she is on for history of DVT  Transthoracic echo ordered notable for left atrial dilation along with pulmonary hypertension  Remove telemetry as rates are stable  TSH WNL on admission    Head injury  Assessment & Plan  Presumably strike her head when she fell, head right periorbital edema on admission.  CT head negative.  CT maxillofacial negative.           VTE prophylaxis: therapeutic anticoagulation with 20mg xarelto    I have performed a physical exam and reviewed and updated ROS and Plan today (4/11/2024). In review of yesterday's note (4/10/2024), there are no changes except as documented above.

## 2024-04-11 NOTE — CARE PLAN
The patient is Stable - Low risk of patient condition declining or worsening    Shift Goals  Clinical Goals: q2 turns, pain managament  Patient Goals: rest  Family Goals: JANETH    Progress made toward(s) clinical / shift goals:    Problem: Pain - Standard  Goal: Alleviation of pain or a reduction in pain to the patient’s comfort goal  Description: Target End Date:  Prior to discharge or change in level of care    Document on Vitals flowsheet    1.  Document pain using the appropriate pain scale per order or unit policy  2.  Educate and implement non-pharmacologic comfort measures (i.e. relaxation, distraction, massage, cold/heat therapy, etc.)  3.  Pain management medications as ordered  4.  Reassess pain after pain med administration per policy  5.  If opiods administered assess patient's response to pain medication is appropriate per POSS sedation scale  6.  Follow pain management plan developed in collaboration with patient and interdisciplinary team (including palliative care or pain specialists if applicable)  Outcome: Progressing     Problem: Communication  Goal: The ability to communicate needs accurately and effectively will improve  Description: Target End Date:  End of day 1    1.  Assess ability to communicate and understand  2.  Provide augmentative or alternative methods of communication devices  3.  Use /language line as appropriate  4.  Collaborate with Speech Therapy as needed  Outcome: Progressing     Problem: Hemodynamics  Goal: Patient's hemodynamics, fluid balance and neurologic status will be stable or improve  Description: Target End Date:  Prior to discharge or change in level of care    Document on Assessment and I/O flowsheet templates    1.  Monitor vital signs, pulse oximetry and cardiac monitor per provider order and/or policy  2.  Maintain blood pressure per provider order  3.  Hemodynamic monitoring per provider order  4.  Manage IV fluids and IV infusions  5.  Monitor intake  and output  6.  Daily weights per unit policy or provider order  7.  Assess peripheral pulses and capillary refill  8.  Assess color and body temperature  9.  Position patient for maximum circulation/cardiac output  10. Monitor for signs/symptoms of excessive bleeding  11. Assess mental status, restlessness and changes in level of consciousness  12. Monitor temperature and report fever or hypothermia to provider immediately. Consideration of targeted temperature management.  Outcome: Progressing       Patient is not progressing towards the following goals:

## 2024-04-12 ENCOUNTER — PATIENT OUTREACH (OUTPATIENT)
Dept: SCHEDULING | Facility: IMAGING CENTER | Age: 69
End: 2024-04-12
Payer: MEDICARE

## 2024-04-12 VITALS
HEART RATE: 70 BPM | HEIGHT: 62 IN | SYSTOLIC BLOOD PRESSURE: 140 MMHG | DIASTOLIC BLOOD PRESSURE: 84 MMHG | WEIGHT: 288.8 LBS | TEMPERATURE: 98.1 F | BODY MASS INDEX: 53.15 KG/M2 | OXYGEN SATURATION: 90 % | RESPIRATION RATE: 17 BRPM

## 2024-04-12 LAB
BACTERIA BLD CULT: NORMAL
SIGNIFICANT IND 70042: NORMAL
SITE SITE: NORMAL
SOURCE SOURCE: NORMAL

## 2024-04-12 PROCEDURE — 99239 HOSP IP/OBS DSCHRG MGMT >30: CPT | Mod: GC | Performed by: INTERNAL MEDICINE

## 2024-04-12 PROCEDURE — A9270 NON-COVERED ITEM OR SERVICE: HCPCS

## 2024-04-12 PROCEDURE — G0378 HOSPITAL OBSERVATION PER HR: HCPCS

## 2024-04-12 PROCEDURE — 700102 HCHG RX REV CODE 250 W/ 637 OVERRIDE(OP): Performed by: INTERNAL MEDICINE

## 2024-04-12 PROCEDURE — A9270 NON-COVERED ITEM OR SERVICE: HCPCS | Performed by: INTERNAL MEDICINE

## 2024-04-12 PROCEDURE — 700102 HCHG RX REV CODE 250 W/ 637 OVERRIDE(OP)

## 2024-04-12 PROCEDURE — 97530 THERAPEUTIC ACTIVITIES: CPT

## 2024-04-12 PROCEDURE — 700101 HCHG RX REV CODE 250

## 2024-04-12 RX ORDER — METOPROLOL SUCCINATE 25 MG/1
25 TABLET, EXTENDED RELEASE ORAL DAILY
Qty: 30 TABLET | Refills: 1 | Status: SHIPPED | OUTPATIENT
Start: 2024-04-13

## 2024-04-12 RX ORDER — LIDOCAINE 4 G/G
1 PATCH TOPICAL EVERY 24 HOURS
Qty: 14 PATCH | Refills: 0 | Status: SHIPPED | OUTPATIENT
Start: 2024-04-12

## 2024-04-12 RX ORDER — LIDOCAINE 4 G/G
1 PATCH TOPICAL EVERY 24 HOURS
Qty: 14 PATCH | Refills: 0 | Status: SHIPPED | OUTPATIENT
Start: 2024-04-13

## 2024-04-12 RX ORDER — NYSTATIN 100000 [USP'U]/G
1 POWDER TOPICAL 2 TIMES DAILY
Qty: 15 G | Refills: 0 | Status: SHIPPED | OUTPATIENT
Start: 2024-04-12 | End: 2024-04-13

## 2024-04-12 RX ORDER — ACETAMINOPHEN 325 MG/1
1000 TABLET ORAL EVERY 6 HOURS PRN
Qty: 30 TABLET | Refills: 0 | Status: SHIPPED | OUTPATIENT
Start: 2024-04-12

## 2024-04-12 RX ORDER — BISACODYL 10 MG
10 SUPPOSITORY, RECTAL RECTAL ONCE
Status: COMPLETED | OUTPATIENT
Start: 2024-04-12 | End: 2024-04-12

## 2024-04-12 RX ADMIN — DULOXETINE HYDROCHLORIDE 20 MG: 20 CAPSULE, DELAYED RELEASE ORAL at 04:16

## 2024-04-12 RX ADMIN — BISACODYL 10 MG: 10 SUPPOSITORY RECTAL at 09:41

## 2024-04-12 RX ADMIN — NYSTATIN: 100000 POWDER TOPICAL at 04:15

## 2024-04-12 RX ADMIN — OMEPRAZOLE 20 MG: 20 CAPSULE, DELAYED RELEASE ORAL at 04:16

## 2024-04-12 RX ADMIN — LIDOCAINE 1 PATCH: 4 PATCH TOPICAL at 09:41

## 2024-04-12 RX ADMIN — METOPROLOL SUCCINATE 25 MG: 25 TABLET, EXTENDED RELEASE ORAL at 04:16

## 2024-04-12 RX ADMIN — Medication 1000 UNITS: at 09:41

## 2024-04-12 ASSESSMENT — COGNITIVE AND FUNCTIONAL STATUS - GENERAL
MOVING FROM LYING ON BACK TO SITTING ON SIDE OF FLAT BED: A LOT
CLIMB 3 TO 5 STEPS WITH RAILING: TOTAL
WALKING IN HOSPITAL ROOM: TOTAL
TURNING FROM BACK TO SIDE WHILE IN FLAT BAD: A LOT
STANDING UP FROM CHAIR USING ARMS: TOTAL
MOBILITY SCORE: 8
SUGGESTED CMS G CODE MODIFIER MOBILITY: CM
MOVING TO AND FROM BED TO CHAIR: TOTAL

## 2024-04-12 ASSESSMENT — GAIT ASSESSMENTS: GAIT LEVEL OF ASSIST: UNABLE TO PARTICIPATE

## 2024-04-12 NOTE — DISCHARGE SUMMARY
[facility] Internal Medicine Discharge Summary    Attending: Fredy Delcid M.d.  Senior Resident: Dr. Kim  Intern:  Dr. Her  Contact Number: [phone]    CHIEF COMPLAINT ON ADMISSION  Chief Complaint   Patient presents with    T-5000 GLF     Pt tried to get out of her recliner last night, felt weak & fell to her knees. Friends found her on the ground this morning. Does not remember if she hit her head, however R eye appears bruised. On blood thinners for DVT, unsure which. Pt is oriented but groggy & does not fully recall falling. Pt smells of urine, is slow to respond.       Reason for Admission  Ground-level fall at home    Admission Date  4/7/2024    CODE STATUS  Full Code    HPI & HOSPITAL COURSE  Nieves Murphy is a 69 y.o. female admitted 4/7/2024 with new onset atrial fibrillation, history of left lower extremity DVT on rivaroxaban 20 mg daily, type 2 diabetes on metformin presented after ground-level fall.  Patient was transferring from recliner to wheelchair that utilizes daily for chronic weakness.  She fell to ground and remained down for 20 hours.  Imaging on admission notable for acute L3 and L4 compression fractures with chronic L3 compression fracture.  Neurosurgery consulted on admission recommended TLSO brace when out of bed and outpatient follow-up in 6 weeks.  Her pain is overall well-controlled with as needed pain medication and lidocaine patches to the low spine and right rib.  Hospital course complicated by new onset atrial fibrillation identified, already on anticoagulation with Xarelto 20 mg daily given chronic left lower extremity DVT.  Recommend continuing this indefinitely.  Rate control initiated with Toprol 25 mg which appears to be adequate dose.  Echocardiac performed notable for pulmonary hypertension, dilated left atrium.  In the setting of nocturnal desaturation concern for sleep apnea and sleep medicine consult placed which patient is agreeable to.  Recommend close  follow-up with primary care provider regarding new onset atrial fibrillation and finding of elevated parathyroid hormone in setting of ongoing fracture.  Supplementing vitamin D, recommend repeat PTH in 2 to 3 months and assess calcium level.  If PTH remains elevated, could consider parathyroid ultrasound to assess for primary hyper parathyroidism.  On day of discharge, patient is hemodynamically stable and saturating appropriately on room air and suitable to transfer to skilled nursing facility for continued strengthening prior to return to home.    Therefore, she is discharged in fair and stable condition to skilled nursing facility.    The patient met 2-midnight criteria for an inpatient stay at the time of discharge.    Discharge Date  4/12/24    Physical Exam on Day of Discharge  Physical Exam  Constitutional:       General: She is not in acute distress.     Appearance: Normal appearance. She is not ill-appearing.   Eyes:      General: No scleral icterus.  Cardiovascular:      Rate and Rhythm: Normal rate. Rhythm irregular.      Heart sounds: No murmur heard.  Pulmonary:      Effort: Pulmonary effort is normal. No respiratory distress.      Breath sounds: Normal breath sounds. No wheezing.   Abdominal:      General: Bowel sounds are normal. There is no distension.      Palpations: Abdomen is soft.      Tenderness: There is no abdominal tenderness.   Musculoskeletal:         General: Tenderness (tednerness anterior right rib) present.      Right lower leg: No edema.      Left lower leg: No edema.   Skin:     General: Skin is warm.   Neurological:      General: No focal deficit present.      Mental Status: She is alert and oriented to person, place, and time. Mental status is at baseline.      Cranial Nerves: No cranial nerve deficit.   Psychiatric:         Behavior: Behavior normal.         FOLLOW UP ITEMS POST DISCHARGE  Follow close with primary care provider regarding elevated parathyroid hormone level, could  be contributing to fractures.  Initiated vitamin D supplementation and will require follow-up of calcium and repeat parathyroid hormone level in 2 to 3 months.  If remains high could consider parathyroid ultrasound.    Will send referral for sleep medicine, patient likely with chronic sleep apnea contributing to new onset atrial fibrillation which is now rate controlled and anticoagulation.  DISCHARGE DIAGNOSES  Principal Problem:    Lumbar compression fracture, closed, initial encounter (HCC) (POA: Yes)  Active Problems:    Head injury (POA: Unknown)    Atrial fibrillation (HCC) (POA: Unknown)    Lactic acid acidosis (POA: Yes)    History of DVT (deep vein thrombosis) (POA: Yes)    Elevated CPK (POA: Unknown)    Mood disorder (HCC) (POA: Unknown)    GERD (gastroesophageal reflux disease) (POA: Unknown)    Pulmonary hypertension (HCC) (POA: Unknown)    Acute hypoxic respiratory failure (HCC) (POA: Unknown)    Costochondritis (POA: Unknown)    Constipation (POA: Unknown)  Resolved Problems:    Encephalopathy (POA: Unknown)    Rhabdomyolysis (POA: Unknown)      FOLLOW UP  No future appointments.  [address] [address]  [phone]          MEDICATIONS ON DISCHARGE     Medication List        START taking these medications        Instructions   acetaminophen 325 MG Tabs  Commonly known as: Tylenol   Take 3 Tablets by mouth every 6 hours as needed for Moderate Pain or Mild Pain.  Dose: 975 mg     * lidocaine 4 % Ptch  Commonly known as: Asperflex   Place 1 Patch on the skin every 24 hours.  Dose: 1 Patch     * lidocaine 4 % Ptch  Start taking on: April 13, 2024  Commonly known as: Asperflex   Place 1 Patch on the skin every 24 hours.  Dose: 1 Patch     metoprolol SR 25 MG Tb24  Start taking on: April 13, 2024  Commonly known as: Toprol XL   Take 1 Tablet by mouth every day.  Dose: 25 mg     nystatin powder  Commonly known as: Mycostatin   Apply 1 g topically 2 times a day  for 1 day.  Dose: 1 Application     vitamin D 1000 UNIT Tabs  Start taking on: April 13, 2024  Commonly known as: Cholecalciferol   Take 1 Tablet by mouth every day with lunch.  Dose: 1,000 Units           * This list has 2 medication(s) that are the same as other medications prescribed for you. Read the directions carefully, and ask your doctor or other care provider to review them with you.                CONTINUE taking these medications        Instructions   ALPRAZolam 0.25 MG Tabs  Commonly known as: Xanax   Take 0.25 mg by mouth 3 times a day as needed for Sleep or Anxiety.  Dose: 0.25 mg     DULoxetine 20 MG Cpep  Commonly known as: Cymbalta   Take 20 mg by mouth 2 times a day.  Dose: 20 mg     metFORMIN  MG Tb24  Commonly known as: Glucophage XR   Take 500 mg by mouth 2 times a day.  Dose: 500 mg     pantoprazole 20 MG tablet  Commonly known as: Protonix   Take 20 mg by mouth every day.  Dose: 20 mg     rivaroxaban 20 MG Tabs tablet  Commonly known as: Xarelto   Doctor's comments: This rx was submitted by a pharmacist working under a collaborative practice agreement.  Take 1 Tab by mouth with dinner.  Dose: 20 mg            ASK your doctor about these medications        Instructions   ARIPiprazole 2 MG tablet  Commonly known as: Abilify   Take 2 mg by mouth every day.  Dose: 2 mg     gabapentin 100 MG Caps  Commonly known as: Neurontin   Take 200 mg by mouth every bedtime.  Dose: 200 mg     promethazine 25 MG Tabs  Commonly known as: Phenergan   Take 25 mg by mouth every 6 hours as needed for Nausea/Vomiting.  Dose: 25 mg     traMADol 50 MG Tabs  Commonly known as: Ultram   Take  mg by mouth every four hours as needed for Mild Pain.  Dose:  mg              Allergies  Allergies   Allergen Reactions    Asa [Aspirin] Unspecified     GI DISCOMFORT    Pcn [Penicillins] Unspecified     GI DISCOMFORT       DIET  Orders Placed This Encounter   Procedures    Diet Order Diet: Consistent CHO  (Diabetic)     Standing Status:   Standing     Number of Occurrences:   1     Order Specific Question:   Diet:     Answer:   Consistent CHO (Diabetic) [4]       ACTIVITY  As tolerated.  Weight bearing as tolerated    CONSULTATIONS  Orthopedic surgery, recommend TLSO brace for compression fracture and follow-up in clinic in 6 weeks.    PROCEDURES  None    LABORATORY  Lab Results   Component Value Date    SODIUM 143 04/10/2024    POTASSIUM 3.7 04/10/2024    CHLORIDE 106 04/10/2024    CO2 30 04/10/2024    GLUCOSE 97 04/10/2024    BUN 10 04/10/2024    CREATININE 0.47 (L) 04/10/2024        Lab Results   Component Value Date    WBC 5.4 04/10/2024    HEMOGLOBIN 9.6 (L) 04/10/2024    HEMATOCRIT 32.8 (L) 04/10/2024    PLATELETCT 211 04/10/2024        Total time of the discharge process exceeds 49 minutes.

## 2024-04-12 NOTE — THERAPY
Physical Therapy   Daily Treatment     Patient Name: Nieves Murphy  Age:  69 y.o., Sex:  female  Medical Record #: [id number]  Today's Date: 4/12/2024     Precautions  Precautions: Fall Risk;TLSO (Thoracolumbosacral orthosis);Spinal / Back Precautions   Comments: custom TLSO    Assessment  Pt seen for PT tx session in conjunction with TLSO brace rep in attempt to fit new custom TLSO. Pt is at Min A for rolling in bed and able to tolerate frequent position changes, however the custom TLSO did not fit correctly. The rep stated he would make an adjustment and return later today to attempt fitting again. OOB mobility deferred at this time. Continue to recommend placement, will follow.     Plan    Treatment Plan Status: Continue Current Treatment Plan  Type of Treatment: Bed Mobility, Gait Training, Manual Therapy, Neuro Re-Education / Balance, Self Care / Home Evaluation, Therapeutic Activities, Therapeutic Exercise  Treatment Frequency: 4 Times per Week  Treatment Duration: Until Therapy Goals Met    DC Equipment Recommendations: Unable to determine at this time  Discharge Recommendations: Recommend post-acute placement for additional physical therapy services prior to discharge home        Vitals   O2 (LPM) 0   O2 Delivery Device None - Room Air   Pain 0 - 10 Group   Therapist Pain Assessment   (no c/o pain)   Non Verbal Descriptors   Non Verbal Scale  Calm   Cognition    Cognition / Consciousness WDL   Balance   Comments OOB mobility NT due to TLSO not fitting   Bed Mobility    Rolling Minimal Assist to Rt.;Minimum Assist to Lt.   Comments rolling to done brace in supine   Gait Analysis   Gait Level Of Assist Unable to Participate   Comments non ambulatory at baseline   Functional Mobility   Sit to Stand Unable to Participate   Bed, Chair, Wheelchair Transfer Unable to Participate   Mobility supine only   Comments OOB mobility NT due to TLSO not fitting   6 Clicks Assessment - How much HELP from from another  person do you currently need... (If the patient hasn't done an activity recently, how much help from another person do you think he/she would need if he/she tried?)   Turning from your back to your side while in a flat bed without using bedrails? 2   Moving from lying on your back to sitting on the side of a flat bed without using bedrails? 2   Moving to and from a bed to a chair (including a wheelchair)? 1   Standing up from a chair using your arms (e.g., wheelchair, or bedside chair)? 1   Walking in hospital room? 1   Climbing 3-5 steps with a railing? 1   6 clicks Mobility Score 8   Short Term Goals    Short Term Goal # 1 Pt will roll left <> right with use of rail at S level by tx 6   Goal Outcome # 1 Progressing as expected   Short Term Goal # 2 Pt will perform side <> sit in bed with S bytx 6   Goal Outcome # 2 Progressing as expected   Short Term Goal # 3 Pt will perfrorm sit to stand from bed to stanislaw steady with Min A by tx 2   Goal Outcome # 3 Goal not met   Short Term Goal # 4 Pt will perform sit to stand from chair to FWW at Min A level by tx 6   Goal Outcome # 4 Goal not met   Short Term Goal # 5 pt will perform BLE exercises in sitting and supine x 10 reps with S by tx 2   Goal Outcome # 5 Goal met   Physical Therapy Treatment Plan   Physical Therapy Treatment Plan Continue Current Treatment Plan   Anticipated Discharge Equipment and Recommendations   DC Equipment Recommendations Unable to determine at this time   Discharge Recommendations Recommend post-acute placement for additional physical therapy services prior to discharge home   Interdisciplinary Plan of Care Collaboration   IDT Collaboration with  Nursing;Certified Nursing Assistant  (TLSO brace rep)   Patient Position at End of Therapy In Bed;Bed Alarm On;Call Light within Reach;Tray Table within Reach;Phone within Reach   Collaboration Comments RN updated   Session Information   Date / Session Number  4/12- 3 (3/4, 4/14)

## 2024-04-12 NOTE — DISCHARGE PLANNING
DC Transport Scheduled    Transport Company Scheduled:  [employer]  Spoke with Tabatha at [facility] to schedule transport.    Scheduled Date: 4/12/2024  Scheduled Time: 1500    Destination: [facility]   Destination address: [address] [place] [address] [place] [address]    Notified care team of scheduled transport via [employer].     If there are any changes needed to the DC transportation scheduled, please contact [facility] at ext. [phone] between the hours of 4858-2715 Mon-Fri. If outside those hours, contact the ED Case Manager at ext. [phone].

## 2024-04-12 NOTE — DISCHARGE PLANNING
Patient rolled back to observation/outpatient status per attending MD determination (Dr. Delcid) and UR committee MD secondary review (Dr. Hargrove).  Condition code 44 completed.

## 2024-04-12 NOTE — DISCHARGE PLANNING
Case Management Discharge Planning    Admission Date: 4/7/2024  GMLOS: 5.2  ALOS: 5    6-Clicks ADL Score: 14  6-Clicks Mobility Score: 8  PT and/or OT Eval ordered: Yes  Post-acute Referrals Ordered: Yes  Post-acute Choice Obtained: Yes  Has referral(s) been sent to post-acute provider:  Yes      Anticipated Discharge Dispo: Discharge Disposition: D/T to SNF with Medicare cert in anticipation of skilled care (03)    DME Needed: No    Action(s) Taken: [facility] has insurance auth and able to take pt today, however pt has not had BM since Monday per RN. Pt passing gas and getting suppository currently. MD aware pt will need KUB scan prior to DC if she does not have a BM.    Transport request sent to ride line for 1500 .    Addendum @6169  LSW met with pt at bedside to provide update. Code 44 given.    Addendum @9877  RN confirmed pt had a BM. Cobra completed and given to RN.    Escalations Completed: None    Medically Clear: Yes    Next Steps: no CM needs    Barriers to Discharge: None    Is the patient up for discharge tomorrow: No-anticipated to DC to [facility] today at 1500

## 2024-04-12 NOTE — CARE PLAN
The patient is Stable - Low risk of patient condition declining or worsening    Shift Goals  Clinical Goals: q2 turns, pain control  Patient Goals: pain control, rest  Family Goals: JANETH    Progress made toward(s) clinical / shift goals:    Problem: Pain - Standard  Goal: Alleviation of pain or a reduction in pain to the patient’s comfort goal  Description: Target End Date:  Prior to discharge or change in level of care    Document on Vitals flowsheet    1.  Document pain using the appropriate pain scale per order or unit policy  2.  Educate and implement non-pharmacologic comfort measures (i.e. relaxation, distraction, massage, cold/heat therapy, etc.)  3.  Pain management medications as ordered  4.  Reassess pain after pain med administration per policy  5.  If opiods administered assess patient's response to pain medication is appropriate per POSS sedation scale  6.  Follow pain management plan developed in collaboration with patient and interdisciplinary team (including palliative care or pain specialists if applicable)  Outcome: Progressing  Note: Patient reported 9/10 pain treated with prn pain meds with reported relief.      Problem: Skin Integrity  Goal: Skin integrity is maintained or improved  Description: Target End Date:  Prior to discharge or change in level of care    Document interventions on Skin Risk/Mayito flowsheet groups and corresponding LDA    1.  Assess and monitor skin integrity, appearance and/or temperature  2.  Assess risk factors for impaired skin integrity and/or pressures ulcers  3.  Implement precautions to protect skin integrity in collaboration with interdisciplinary team  4.  Implement pressure ulcer prevention protocol if at risk for skin breakdown  5.  Confirm wound care consult if at risk for skin breakdown  6.  Ensure patient use of pressure relieving devices  (Low air loss bed, waffle overlay, heel protectors, ROHO cushion, etc)  Outcome: Progressing  Note: Patient on q2 turns.   Panus cleansed with soap and water and nystatin applied.      Problem: Fall Risk  Goal: Patient will remain free from falls  Description: Target End Date:  Prior to discharge or change in level of care    Document interventions on the Delaney Mcgill Fall Risk Assessment    1.  Assess for fall risk factors  2.  Implement fall precautions  Outcome: Progressing  Note: Patient remains safe in bed with call light and personal items within reach.  Fall precautions in place.  Patient uses call light appropriately for assistance.

## 2024-04-12 NOTE — DISCHARGE PLANNING
LM for [facility] to follow up on insurance auth.    [id number]  Received VM from [facility] letting me know they have a bed available for today. Confirmed insurance auth is good to go w Ann. Transport by [facility] will be set up for 1400 today.     [id number]  Spoke with Ann with [facility] to let her know pt has not had a bowel movement since 4/8/24. Ann requested a KUB scan and for transport to be set up for 1500 just in case. Let her know pt is receiving a suppository at the moment.

## 2024-04-12 NOTE — DISCHARGE INSTRUCTIONS
You are hospitalized for generalized weakness and fall at home, you are found to have new compression fractures of the lumbar spine.  Recommend close follow-up with primary care provider after discharge from skilled nursing facility chronic medical problems and specifically elevated parathyroid hormone that could be contributing to fractures and weakness.    You are also found to have new onset atrial fibrillation, an abnormal heart rhythm.  A medication was initiated to slow your heart rate recommend continuing to take this every day along with your blood thinner that you had for prior left lower extremity edema to prevent risk of stroke.  Recommend following with primary care provider and obtaining outpatient sleep study as you likely have sleep apnea would benefit from a CPAP.

## 2024-09-17 ENCOUNTER — APPOINTMENT (OUTPATIENT)
Dept: RADIOLOGY | Facility: MEDICAL CENTER | Age: 69
DRG: 300 | End: 2024-09-17
Attending: EMERGENCY MEDICINE
Payer: MEDICARE

## 2024-09-17 ENCOUNTER — HOSPITAL ENCOUNTER (INPATIENT)
Facility: MEDICAL CENTER | Age: 69
LOS: 7 days | DRG: 300 | End: 2024-09-24
Attending: EMERGENCY MEDICINE | Admitting: INTERNAL MEDICINE
Payer: MEDICARE

## 2024-09-17 DIAGNOSIS — I27.20 PULMONARY HYPERTENSION (HCC): ICD-10-CM

## 2024-09-17 DIAGNOSIS — I82.4Y9 DEEP VEIN THROMBOSIS (DVT) OF PROXIMAL LOWER EXTREMITY, UNSPECIFIED CHRONICITY, UNSPECIFIED LATERALITY (HCC): ICD-10-CM

## 2024-09-17 DIAGNOSIS — Z86.718 HISTORY OF DVT (DEEP VEIN THROMBOSIS): Primary | ICD-10-CM

## 2024-09-17 DIAGNOSIS — R06.83 SNORES: ICD-10-CM

## 2024-09-17 DIAGNOSIS — M79.89 LEG SWELLING: ICD-10-CM

## 2024-09-17 LAB
ALBUMIN SERPL BCP-MCNC: 3.1 G/DL (ref 3.2–4.9)
ALBUMIN/GLOB SERPL: 0.9 G/DL
ALP SERPL-CCNC: 108 U/L (ref 30–99)
ALT SERPL-CCNC: 8 U/L (ref 2–50)
ANION GAP SERPL CALC-SCNC: 10 MMOL/L (ref 7–16)
ANION GAP SERPL CALC-SCNC: 11 MMOL/L (ref 7–16)
AST SERPL-CCNC: 22 U/L (ref 12–45)
BASOPHILS # BLD AUTO: 1 % (ref 0–1.8)
BASOPHILS # BLD: 0.06 K/UL (ref 0–0.12)
BILIRUB SERPL-MCNC: 0.6 MG/DL (ref 0.1–1.5)
BUN SERPL-MCNC: 5 MG/DL (ref 8–22)
BUN SERPL-MCNC: 7 MG/DL (ref 8–22)
CALCIUM ALBUM COR SERPL-MCNC: 8.5 MG/DL (ref 8.5–10.5)
CALCIUM SERPL-MCNC: 7.8 MG/DL (ref 8.5–10.5)
CALCIUM SERPL-MCNC: 7.9 MG/DL (ref 8.5–10.5)
CHLORIDE SERPL-SCNC: 104 MMOL/L (ref 96–112)
CHLORIDE SERPL-SCNC: 105 MMOL/L (ref 96–112)
CO2 SERPL-SCNC: 25 MMOL/L (ref 20–33)
CO2 SERPL-SCNC: 27 MMOL/L (ref 20–33)
CREAT SERPL-MCNC: 0.46 MG/DL (ref 0.5–1.4)
CREAT SERPL-MCNC: 0.62 MG/DL (ref 0.5–1.4)
EOSINOPHIL # BLD AUTO: 0.13 K/UL (ref 0–0.51)
EOSINOPHIL NFR BLD: 2.1 % (ref 0–6.9)
ERYTHROCYTE [DISTWIDTH] IN BLOOD BY AUTOMATED COUNT: 62.9 FL (ref 35.9–50)
GFR SERPLBLD CREATININE-BSD FMLA CKD-EPI: 102 ML/MIN/1.73 M 2
GFR SERPLBLD CREATININE-BSD FMLA CKD-EPI: 95 ML/MIN/1.73 M 2
GLOBULIN SER CALC-MCNC: 3.6 G/DL (ref 1.9–3.5)
GLUCOSE BLD STRIP.AUTO-MCNC: 71 MG/DL (ref 65–99)
GLUCOSE BLD STRIP.AUTO-MCNC: 81 MG/DL (ref 65–99)
GLUCOSE SERPL-MCNC: 78 MG/DL (ref 65–99)
GLUCOSE SERPL-MCNC: 95 MG/DL (ref 65–99)
HCT VFR BLD AUTO: 37.4 % (ref 37–47)
HGB BLD-MCNC: 11.5 G/DL (ref 12–16)
IMM GRANULOCYTES # BLD AUTO: 0.02 K/UL (ref 0–0.11)
IMM GRANULOCYTES NFR BLD AUTO: 0.3 % (ref 0–0.9)
LYMPHOCYTES # BLD AUTO: 1.47 K/UL (ref 1–4.8)
LYMPHOCYTES NFR BLD: 23.8 % (ref 22–41)
MAGNESIUM SERPL-MCNC: 1.9 MG/DL (ref 1.5–2.5)
MAGNESIUM SERPL-MCNC: 2.2 MG/DL (ref 1.5–2.5)
MCH RBC QN AUTO: 27 PG (ref 27–33)
MCHC RBC AUTO-ENTMCNC: 30.7 G/DL (ref 32.2–35.5)
MCV RBC AUTO: 87.8 FL (ref 81.4–97.8)
MONOCYTES # BLD AUTO: 0.53 K/UL (ref 0–0.85)
MONOCYTES NFR BLD AUTO: 8.6 % (ref 0–13.4)
NEUTROPHILS # BLD AUTO: 3.96 K/UL (ref 1.82–7.42)
NEUTROPHILS NFR BLD: 64.2 % (ref 44–72)
NRBC # BLD AUTO: 0 K/UL
NRBC BLD-RTO: 0 /100 WBC (ref 0–0.2)
NT-PROBNP SERPL IA-MCNC: 710 PG/ML (ref 0–125)
PHOSPHATE SERPL-MCNC: 3.2 MG/DL (ref 2.5–4.5)
PLATELET # BLD AUTO: 276 K/UL (ref 164–446)
PMV BLD AUTO: 10 FL (ref 9–12.9)
POTASSIUM SERPL-SCNC: 3.1 MMOL/L (ref 3.6–5.5)
POTASSIUM SERPL-SCNC: 3.2 MMOL/L (ref 3.6–5.5)
PROT SERPL-MCNC: 6.7 G/DL (ref 6–8.2)
RBC # BLD AUTO: 4.26 M/UL (ref 4.2–5.4)
SODIUM SERPL-SCNC: 141 MMOL/L (ref 135–145)
SODIUM SERPL-SCNC: 141 MMOL/L (ref 135–145)
WBC # BLD AUTO: 6.2 K/UL (ref 4.8–10.8)

## 2024-09-17 PROCEDURE — 84100 ASSAY OF PHOSPHORUS: CPT

## 2024-09-17 PROCEDURE — 700117 HCHG RX CONTRAST REV CODE 255: Mod: UD | Performed by: EMERGENCY MEDICINE

## 2024-09-17 PROCEDURE — 82962 GLUCOSE BLOOD TEST: CPT

## 2024-09-17 PROCEDURE — 96372 THER/PROPH/DIAG INJ SC/IM: CPT

## 2024-09-17 PROCEDURE — 700111 HCHG RX REV CODE 636 W/ 250 OVERRIDE (IP): Mod: JZ | Performed by: EMERGENCY MEDICINE

## 2024-09-17 PROCEDURE — 770001 HCHG ROOM/CARE - MED/SURG/GYN PRIV*

## 2024-09-17 PROCEDURE — 93970 EXTREMITY STUDY: CPT | Mod: 26 | Performed by: INTERNAL MEDICINE

## 2024-09-17 PROCEDURE — A9270 NON-COVERED ITEM OR SERVICE: HCPCS

## 2024-09-17 PROCEDURE — 83880 ASSAY OF NATRIURETIC PEPTIDE: CPT

## 2024-09-17 PROCEDURE — 700102 HCHG RX REV CODE 250 W/ 637 OVERRIDE(OP): Mod: UD | Performed by: EMERGENCY MEDICINE

## 2024-09-17 PROCEDURE — 80053 COMPREHEN METABOLIC PANEL: CPT

## 2024-09-17 PROCEDURE — 36415 COLL VENOUS BLD VENIPUNCTURE: CPT

## 2024-09-17 PROCEDURE — 85025 COMPLETE CBC W/AUTO DIFF WBC: CPT

## 2024-09-17 PROCEDURE — 700102 HCHG RX REV CODE 250 W/ 637 OVERRIDE(OP): Performed by: INTERNAL MEDICINE

## 2024-09-17 PROCEDURE — 99285 EMERGENCY DEPT VISIT HI MDM: CPT

## 2024-09-17 PROCEDURE — 73590 X-RAY EXAM OF LOWER LEG: CPT | Mod: RT

## 2024-09-17 PROCEDURE — 71275 CT ANGIOGRAPHY CHEST: CPT

## 2024-09-17 PROCEDURE — 83735 ASSAY OF MAGNESIUM: CPT | Mod: 91

## 2024-09-17 PROCEDURE — A9270 NON-COVERED ITEM OR SERVICE: HCPCS | Mod: UD | Performed by: EMERGENCY MEDICINE

## 2024-09-17 PROCEDURE — 99223 1ST HOSP IP/OBS HIGH 75: CPT | Performed by: INTERNAL MEDICINE

## 2024-09-17 PROCEDURE — 93970 EXTREMITY STUDY: CPT

## 2024-09-17 PROCEDURE — 700111 HCHG RX REV CODE 636 W/ 250 OVERRIDE (IP)

## 2024-09-17 PROCEDURE — A9270 NON-COVERED ITEM OR SERVICE: HCPCS | Performed by: INTERNAL MEDICINE

## 2024-09-17 PROCEDURE — 700105 HCHG RX REV CODE 258

## 2024-09-17 PROCEDURE — 700102 HCHG RX REV CODE 250 W/ 637 OVERRIDE(OP)

## 2024-09-17 PROCEDURE — 80048 BASIC METABOLIC PNL TOTAL CA: CPT

## 2024-09-17 RX ORDER — OXYCODONE AND ACETAMINOPHEN 5; 325 MG/1; MG/1
1 TABLET ORAL ONCE
Status: COMPLETED | OUTPATIENT
Start: 2024-09-17 | End: 2024-09-17

## 2024-09-17 RX ORDER — DEXTROSE MONOHYDRATE 25 G/50ML
25 INJECTION, SOLUTION INTRAVENOUS
Status: DISCONTINUED | OUTPATIENT
Start: 2024-09-17 | End: 2024-09-24 | Stop reason: HOSPADM

## 2024-09-17 RX ORDER — POTASSIUM CHLORIDE 1500 MG/1
40 TABLET, EXTENDED RELEASE ORAL ONCE
Status: COMPLETED | OUTPATIENT
Start: 2024-09-17 | End: 2024-09-17

## 2024-09-17 RX ORDER — HEPARIN SODIUM 5000 [USP'U]/100ML
0-30 INJECTION, SOLUTION INTRAVENOUS CONTINUOUS
Status: DISCONTINUED | OUTPATIENT
Start: 2024-09-17 | End: 2024-09-17

## 2024-09-17 RX ORDER — ACETAMINOPHEN 325 MG/1
650 TABLET ORAL EVERY 6 HOURS PRN
Status: DISCONTINUED | OUTPATIENT
Start: 2024-09-17 | End: 2024-09-19

## 2024-09-17 RX ORDER — ATORVASTATIN CALCIUM 10 MG/1
10 TABLET, FILM COATED ORAL DAILY
Status: DISCONTINUED | OUTPATIENT
Start: 2024-09-18 | End: 2024-09-24 | Stop reason: HOSPADM

## 2024-09-17 RX ORDER — GABAPENTIN 400 MG/1
400 CAPSULE ORAL 3 TIMES DAILY
Status: ON HOLD | COMMUNITY
End: 2024-09-24

## 2024-09-17 RX ORDER — GUAIFENESIN/DEXTROMETHORPHAN 100-10MG/5
10 SYRUP ORAL EVERY 6 HOURS PRN
Status: DISCONTINUED | OUTPATIENT
Start: 2024-09-17 | End: 2024-09-24 | Stop reason: HOSPADM

## 2024-09-17 RX ORDER — ENOXAPARIN SODIUM 150 MG/ML
1 INJECTION SUBCUTANEOUS ONCE
Status: COMPLETED | OUTPATIENT
Start: 2024-09-17 | End: 2024-09-17

## 2024-09-17 RX ORDER — SODIUM CHLORIDE, SODIUM LACTATE, POTASSIUM CHLORIDE, CALCIUM CHLORIDE 600; 310; 30; 20 MG/100ML; MG/100ML; MG/100ML; MG/100ML
INJECTION, SOLUTION INTRAVENOUS CONTINUOUS
Status: DISCONTINUED | OUTPATIENT
Start: 2024-09-17 | End: 2024-09-17

## 2024-09-17 RX ORDER — ALBUTEROL SULFATE 90 UG/1
2 INHALANT RESPIRATORY (INHALATION) EVERY 6 HOURS PRN
Status: ON HOLD | COMMUNITY
End: 2024-09-24

## 2024-09-17 RX ORDER — MAGNESIUM SULFATE HEPTAHYDRATE 40 MG/ML
2 INJECTION, SOLUTION INTRAVENOUS ONCE
Status: COMPLETED | OUTPATIENT
Start: 2024-09-17 | End: 2024-09-17

## 2024-09-17 RX ORDER — HEPARIN SODIUM 5000 [USP'U]/100ML
0-30 INJECTION, SOLUTION INTRAVENOUS CONTINUOUS
Status: DISCONTINUED | OUTPATIENT
Start: 2024-09-18 | End: 2024-09-18

## 2024-09-17 RX ORDER — ALBUTEROL SULFATE 90 UG/1
2 INHALANT RESPIRATORY (INHALATION) EVERY 6 HOURS PRN
Status: DISCONTINUED | OUTPATIENT
Start: 2024-09-17 | End: 2024-09-24 | Stop reason: HOSPADM

## 2024-09-17 RX ORDER — LEVOTHYROXINE SODIUM 125 UG/1
125 TABLET ORAL
COMMUNITY

## 2024-09-17 RX ORDER — ONDANSETRON 4 MG/1
4 TABLET, ORALLY DISINTEGRATING ORAL EVERY 4 HOURS PRN
Status: DISCONTINUED | OUTPATIENT
Start: 2024-09-17 | End: 2024-09-24 | Stop reason: HOSPADM

## 2024-09-17 RX ORDER — HEPARIN SODIUM 1000 [USP'U]/ML
40 INJECTION, SOLUTION INTRAVENOUS; SUBCUTANEOUS PRN
Status: DISCONTINUED | OUTPATIENT
Start: 2024-09-17 | End: 2024-09-18

## 2024-09-17 RX ORDER — TRAZODONE HYDROCHLORIDE 50 MG/1
100 TABLET, FILM COATED ORAL NIGHTLY
Status: ON HOLD | COMMUNITY
End: 2024-09-24

## 2024-09-17 RX ORDER — TRAZODONE HYDROCHLORIDE 100 MG/1
100 TABLET ORAL NIGHTLY
Status: DISCONTINUED | OUTPATIENT
Start: 2024-09-17 | End: 2024-09-19

## 2024-09-17 RX ORDER — GABAPENTIN 400 MG/1
400 CAPSULE ORAL 3 TIMES DAILY
Status: DISCONTINUED | OUTPATIENT
Start: 2024-09-17 | End: 2024-09-19

## 2024-09-17 RX ORDER — ATORVASTATIN CALCIUM 10 MG/1
10 TABLET, FILM COATED ORAL DAILY
Status: ON HOLD | COMMUNITY
End: 2024-09-24

## 2024-09-17 RX ORDER — LABETALOL HYDROCHLORIDE 5 MG/ML
10 INJECTION, SOLUTION INTRAVENOUS EVERY 4 HOURS PRN
Status: DISCONTINUED | OUTPATIENT
Start: 2024-09-17 | End: 2024-09-17

## 2024-09-17 RX ORDER — ALPRAZOLAM 0.25 MG/1
0.25 TABLET ORAL NIGHTLY PRN
Status: DISCONTINUED | OUTPATIENT
Start: 2024-09-17 | End: 2024-09-24 | Stop reason: HOSPADM

## 2024-09-17 RX ORDER — ALPRAZOLAM 1 MG/1
1 TABLET ORAL NIGHTLY PRN
Status: SHIPPED | COMMUNITY
End: 2024-09-17

## 2024-09-17 RX ORDER — POTASSIUM CHLORIDE 1500 MG/1
40 TABLET, EXTENDED RELEASE ORAL DAILY
Status: DISCONTINUED | OUTPATIENT
Start: 2024-09-17 | End: 2024-09-23

## 2024-09-17 RX ORDER — METOPROLOL SUCCINATE 25 MG/1
25 TABLET, EXTENDED RELEASE ORAL DAILY
Status: DISCONTINUED | OUTPATIENT
Start: 2024-09-17 | End: 2024-09-24 | Stop reason: HOSPADM

## 2024-09-17 RX ORDER — AMOXICILLIN 250 MG
2 CAPSULE ORAL EVERY EVENING
Status: DISCONTINUED | OUTPATIENT
Start: 2024-09-17 | End: 2024-09-19

## 2024-09-17 RX ORDER — LEVOTHYROXINE SODIUM 125 UG/1
125 TABLET ORAL
Status: DISCONTINUED | OUTPATIENT
Start: 2024-09-18 | End: 2024-09-24 | Stop reason: HOSPADM

## 2024-09-17 RX ORDER — POLYETHYLENE GLYCOL 3350 17 G/17G
1 POWDER, FOR SOLUTION ORAL
Status: DISCONTINUED | OUTPATIENT
Start: 2024-09-17 | End: 2024-09-19

## 2024-09-17 RX ORDER — ONDANSETRON 2 MG/ML
4 INJECTION INTRAMUSCULAR; INTRAVENOUS EVERY 4 HOURS PRN
Status: DISCONTINUED | OUTPATIENT
Start: 2024-09-17 | End: 2024-09-24 | Stop reason: HOSPADM

## 2024-09-17 RX ORDER — SEMAGLUTIDE 1.34 MG/ML
0.25 INJECTION, SOLUTION SUBCUTANEOUS
Status: ON HOLD | COMMUNITY
End: 2024-09-24

## 2024-09-17 RX ADMIN — OXYCODONE AND ACETAMINOPHEN 1 TABLET: 5; 325 TABLET ORAL at 11:35

## 2024-09-17 RX ADMIN — POTASSIUM CHLORIDE 40 MEQ: 1500 TABLET, EXTENDED RELEASE ORAL at 18:50

## 2024-09-17 RX ADMIN — SENNOSIDES AND DOCUSATE SODIUM 2 TABLET: 50; 8.6 TABLET ORAL at 18:50

## 2024-09-17 RX ADMIN — TRAZODONE HYDROCHLORIDE 100 MG: 100 TABLET ORAL at 21:14

## 2024-09-17 RX ADMIN — MAGNESIUM SULFATE HEPTAHYDRATE 2 G: 2 INJECTION, SOLUTION INTRAVENOUS at 19:10

## 2024-09-17 RX ADMIN — ALPRAZOLAM 0.25 MG: 0.25 TABLET ORAL at 21:13

## 2024-09-17 RX ADMIN — IOHEXOL 80 ML: 350 INJECTION, SOLUTION INTRAVENOUS at 14:51

## 2024-09-17 RX ADMIN — GABAPENTIN 400 MG: 400 CAPSULE ORAL at 18:50

## 2024-09-17 RX ADMIN — SODIUM CHLORIDE, POTASSIUM CHLORIDE, SODIUM LACTATE AND CALCIUM CHLORIDE: 600; 310; 30; 20 INJECTION, SOLUTION INTRAVENOUS at 18:51

## 2024-09-17 RX ADMIN — POTASSIUM CHLORIDE 40 MEQ: 1500 TABLET, EXTENDED RELEASE ORAL at 13:14

## 2024-09-17 RX ADMIN — ACETAMINOPHEN 650 MG: 325 TABLET ORAL at 21:13

## 2024-09-17 RX ADMIN — ENOXAPARIN SODIUM 120 MG: 150 INJECTION SUBCUTANEOUS at 15:24

## 2024-09-17 ASSESSMENT — PATIENT HEALTH QUESTIONNAIRE - PHQ9
2. FEELING DOWN, DEPRESSED, IRRITABLE, OR HOPELESS: NOT AT ALL
SUM OF ALL RESPONSES TO PHQ9 QUESTIONS 1 AND 2: 0
1. LITTLE INTEREST OR PLEASURE IN DOING THINGS: NOT AT ALL

## 2024-09-17 ASSESSMENT — ENCOUNTER SYMPTOMS
EYES NEGATIVE: 1
CONSTITUTIONAL NEGATIVE: 1
GASTROINTESTINAL NEGATIVE: 1
MUSCULOSKELETAL NEGATIVE: 1
NEUROLOGICAL NEGATIVE: 1
ORTHOPNEA: 0
PSYCHIATRIC NEGATIVE: 1
CLAUDICATION: 0
RESPIRATORY NEGATIVE: 1
PND: 0
PALPITATIONS: 0

## 2024-09-17 ASSESSMENT — FIBROSIS 4 INDEX: FIB4 SCORE: 2.3

## 2024-09-17 ASSESSMENT — PAIN DESCRIPTION - PAIN TYPE
TYPE: ACUTE PAIN;CHRONIC PAIN
TYPE: ACUTE PAIN

## 2024-09-17 NOTE — ED PROVIDER NOTES
ED Provider Note    CHIEF COMPLAINT  Chief Complaint   Patient presents with    Leg Swelling     Patient complains of bilateral leg swelling x 4 months with right > left. Patient reports having a fall 4 months ago.        EXTERNAL RECORDS REVIEWED  Inpatient Notes most recently admitted to this facility in April 2024 after a fall from her recliner.  At that time she was noted to be on blood thinners (rivaroxaban) for DVT.  She was found to have acute L3 and L4 compression fractures.  She was placed in a TLSO brace.  Her pain was overall well-controlled.  Her hospital course was complicated by new onset atrial fibrillation however she remained on Xarelto 20 mg given her chronic left lower extremity DVT.  An echocardiogram demonstrated pulmonary hypertension and dilated left atrium.    HPI/ROS  LIMITATION TO HISTORY   Select: : None  OUTSIDE HISTORIAN(S):  Caregiver caregiver at bedside reports that over the past month the swelling in the right leg has significantly worsened.  The patient is not ambulatory at baseline, she uses a wheelchair at all times.    Nieves Murphy is a 69 y.o. female who presents with bilateral lower extremity swelling and pain that has been ongoing for 4 months since a fall from her recliner.  After that fall she was hospitalized and ultimately discharged to a skilled nursing facility.  Since that time she has had persistent bruising on her bilateral lower extremities and has developed worsening pain especially in the right lateral ankle.  She is not taking any medication for her symptoms.  She has a history of chronic DVT in her left lower extremity and takes her anticoagulation completely as directed.  She notes that over the past 4 weeks the swelling in her right lower extremity has worsened significantly.  She has pain in her bilateral legs but has not had any fevers, chills, chest pain, shortness of breath.  She has not tried any medication for her symptoms.    PAST MEDICAL  HISTORY   has a past medical history of Anxiety, Arthritis, Dental disorder (2016), DJD (degenerative joint disease), DVT (deep venous thrombosis) ([facility]) (2-), Gynecological disorder, Heart burn (2016), Indigestion, Obesity, and Restless leg syndrome.    SURGICAL HISTORY   has a past surgical history that includes other abdominal surgery; vaginal hysterectomy scope total (1/3/2017); anterior and posterior repair (1/3/2017); enterocele repair (1/3/2017); vaginal suspension (1/3/2017); bladder sling female (1/3/2017); cystoscopy (1/3/2017); anterior and posterior repair (2017); enterocele repair (2017); vaginal suspension (2017); and bladder sling female (2017).    FAMILY HISTORY  History reviewed. No pertinent family history.    SOCIAL HISTORY  Social History     Tobacco Use    Smoking status: Former     Current packs/day: 0.00     Types: Cigarettes     Quit date: 1973     Years since quittin.7    Smokeless tobacco: Not on file   Vaping Use    Vaping status: Never Used   Substance and Sexual Activity    Alcohol use: No     Alcohol/week: 0.0 oz    Drug use: Yes     Types: Inhaled     Comment: marijuana occasionally    Sexual activity: Not on file       CURRENT MEDICATIONS  Home Medications    **Home medications have not yet been reviewed for this encounter**         ALLERGIES  Allergies   Allergen Reactions    Asa [Aspirin] Unspecified     GI DISCOMFORT    Pcn [Penicillins] Unspecified     GI DISCOMFORT       PHYSICAL EXAM  VITAL SIGNS: /80   Pulse 66   Temp 35.9 °C (96.6 °F) (Temporal)   Resp 16   Wt 121 kg (267 lb)   SpO2 94%   BMI 47.30 kg/m²    Physical Exam  Vitals and nursing note reviewed.   Constitutional:       Appearance: Normal appearance.   HENT:      Head: Normocephalic and atraumatic.      Right Ear: External ear normal.      Left Ear: External ear normal.      Nose: Nose normal.      Mouth/Throat:      Mouth: Mucous membranes are moist.      Pharynx:  Oropharynx is clear.   Eyes:      Extraocular Movements: Extraocular movements intact.      Conjunctiva/sclera: Conjunctivae normal.      Pupils: Pupils are equal, round, and reactive to light.   Cardiovascular:      Rate and Rhythm: Normal rate and regular rhythm.      Pulses: Normal pulses.      Comments: Bilateral dorsalis pedis pulses are equal and 2+  Pulmonary:      Effort: Pulmonary effort is normal.   Musculoskeletal:      Cervical back: Normal range of motion and neck supple.      Comments: Body habitus makes it difficult to determine if there is overlying edema but there appears to be nonpitting edema of the bilateral lower extremities, right worse than left.  There is no erythema, crepitus, fluctuance, pain out of proportion to exam.  No open ulcers or wounds.  Tender over right lateral malleolus without overlying erythema.  No deformity.   Skin:     General: Skin is warm and dry.   Neurological:      General: No focal deficit present.      Mental Status: She is alert and oriented to person, place, and time.   Psychiatric:         Mood and Affect: Mood normal.         Behavior: Behavior normal.       EKG/LABS  Results for orders placed or performed during the hospital encounter of 09/17/24   CBC WITH DIFFERENTIAL   Result Value Ref Range    WBC 6.2 4.8 - 10.8 K/uL    RBC 4.26 4.20 - 5.40 M/uL    Hemoglobin 11.5 (L) 12.0 - 16.0 g/dL    Hematocrit 37.4 37.0 - 47.0 %    MCV 87.8 81.4 - 97.8 fL    MCH 27.0 27.0 - 33.0 pg    MCHC 30.7 (L) 32.2 - 35.5 g/dL    RDW 62.9 (H) 35.9 - 50.0 fL    Platelet Count 276 164 - 446 K/uL    MPV 10.0 9.0 - 12.9 fL    Neutrophils-Polys 64.20 44.00 - 72.00 %    Lymphocytes 23.80 22.00 - 41.00 %    Monocytes 8.60 0.00 - 13.40 %    Eosinophils 2.10 0.00 - 6.90 %    Basophils 1.00 0.00 - 1.80 %    Immature Granulocytes 0.30 0.00 - 0.90 %    Nucleated RBC 0.00 0.00 - 0.20 /100 WBC    Neutrophils (Absolute) 3.96 1.82 - 7.42 K/uL    Lymphs (Absolute) 1.47 1.00 - 4.80 K/uL    Monos  (Absolute) 0.53 0.00 - 0.85 K/uL    Eos (Absolute) 0.13 0.00 - 0.51 K/uL    Baso (Absolute) 0.06 0.00 - 0.12 K/uL    Immature Granulocytes (abs) 0.02 0.00 - 0.11 K/uL    NRBC (Absolute) 0.00 K/uL   Comp Metabolic Panel   Result Value Ref Range    Sodium 141 135 - 145 mmol/L    Potassium 3.1 (L) 3.6 - 5.5 mmol/L    Chloride 104 96 - 112 mmol/L    Co2 27 20 - 33 mmol/L    Anion Gap 10.0 7.0 - 16.0    Glucose 95 65 - 99 mg/dL    Bun 7 (L) 8 - 22 mg/dL    Creatinine 0.62 0.50 - 1.40 mg/dL    Calcium 7.8 (L) 8.5 - 10.5 mg/dL    Correct Calcium 8.5 8.5 - 10.5 mg/dL    AST(SGOT) 22 12 - 45 U/L    ALT(SGPT) 8 2 - 50 U/L    Alkaline Phosphatase 108 (H) 30 - 99 U/L    Total Bilirubin 0.6 0.1 - 1.5 mg/dL    Albumin 3.1 (L) 3.2 - 4.9 g/dL    Total Protein 6.7 6.0 - 8.2 g/dL    Globulin 3.6 (H) 1.9 - 3.5 g/dL    A-G Ratio 0.9 g/dL   proBrain Natriuretic Peptide, NT   Result Value Ref Range    NT-proBNP 710 (H) 0 - 125 pg/mL   ESTIMATED GFR   Result Value Ref Range    GFR (CKD-EPI) 95 >60 mL/min/1.73 m 2     I have independently interpreted this EKG    RADIOLOGY/PROCEDURES   I have independently interpreted the diagnostic imaging associated with this visit and am waiting the final reading from the radiologist.   My preliminary interpretation is as follows: No clear fracture of the right lower extremity    Radiologist interpretation:  US-EXTREMITY VENOUS LOWER BILAT   Final Result      DX-TIBIA AND FIBULA RIGHT   Final Result      Demineralization, postsurgical and degenerative change without acute fracture or malalignment of the tibia and fibula.      CT-CTA CHEST PULMONARY ARTERY W/ RECONS    (Results Pending)     COURSE & MEDICAL DECISION MAKING    ASSESSMENT, COURSE AND PLAN  Care Narrative: This is a 69-year-old female who is here with worsening swelling in the right lower extremity.  She has a history of chronic left lower extremity DVT for which she takes Xarelto and states she has been completely compliant.  Here she is  afebrile with normal vital signs.  She appears well-hydrated and nontoxic.  Her body habitus makes it difficult to fully assess whether there is swelling present in her lower extremities but it does appear that her right leg is greater in diameter than the left.  There is some tenderness over the distal aspect of the right lateral lower extremity.  There is no erythema or warmth to suggest cellulitis.  There is no fluctuance to suggest abscess.  There is no crepitus or pain out of proportion to exam to suggest NSTI.  She is able to move the ankle and I have low suspicion for septic joint.  Given her history there is concern for DVT although less likely given her compliance with Xarelto.  I also considered fracture after her fall 4 months ago or pathologic fracture although she does not ambulate at baseline.    CBC is without leukocytosis or left shift.  Metabolic panel demonstrates hypokalemia to 3.1.  This was repleted orally.  BNP is slightly elevated but without chest pain or shortness of breath I think this is unlikely to represent heart failure.  An echocardiogram performed in April 2024 demonstrated normal left ventricular size and systolic function with grade 2 diastolic dysfunction and mildly dilated right ventricle with preserved systolic function.  She does have a severely dilated left atrium and severely elevated right ventricular systolic pressure.    An x-ray thankfully did not demonstrate acute fracture or malalignment of the right tib/fib however and unfortunately an ultrasound of the bilateral lower extremities revealed an acute or subacute partially occlusive thrombus of the distal common femoral vein as well as an acute to subacute partially occlusive thrombus in the proximal, middle, and distal femoral vein.  Because of her compliance with anticoagulation this constitutes a failure of outpatient therapy.  She was started on Lovenox.  I ordered a CTA to rule out PE given O2 sats in the low 90s but  she has no chest pain or shortness of breath.  She will require hospitalization for additional workup and management.  Thankfully CTA chest is without thrombus.  Patient was discussed with [facility] internal medicine resident and admitted in guarded condition.    ADDITIONAL PROBLEMS MANAGED  None    DISPOSITION AND DISCUSSIONS  I have discussed management of the patient with the following physicians and ANJUM's:  Dr. Guadalupe, [facility] IM Resident.    Discussion of management with other [facility] or appropriate source(s): None     Escalation of care considered, and ultimately not performed: N/A    Barriers to care at this time, including but not limited to:  None .     Decision tools and prescription drugs considered including, but not limited to:  None .    FINAL DIAGNOSIS  1.  Right lower extremity DVT  2.  Failure of outpatient anticoagulation     Electronically signed by: Freddy Garcia M.D., 9/17/2024 11:05 AM

## 2024-09-17 NOTE — ED NOTES
Patient transported to [facility] on gurney. Aox4, on room air. Heparin was ordered after report given to Rosy RN in [facility]. Reached out to Primary RN in [facility] regarding heparin drip order. [facility] resident confirmed she will modify potassium order.

## 2024-09-17 NOTE — ASSESSMENT & PLAN NOTE
- Recent onset of Afib in April 2024, patient has been presenting the positives for the last 2 days patient has symptomatic.  - Metoprolol on hold for asymptomatic bradycardia and frequent pauses during sleep for the past two days. (09/22, 09/23).  - Will continue to monitor on telemetry  -Cardiology consulted will appreciate recommendation

## 2024-09-17 NOTE — H&P
[facility] Internal Medicine History & Physical Note    Date of Service  9/17/2024    [facility] Team: R IM Green Team   Attending: Jeremy Strange M.d.  Senior Resident: Dr. Larry Castaneda   Intern:  Dr. Christy Guadalupe   Contact Number: [phone]    Primary Care Physician  Maribel Salazar M.D.    Code Status  Full Code    Chief Complaint  Chief Complaint   Patient presents with    Leg Swelling     Patient complains of bilateral leg swelling x 4 months with right > left. Patient reports having a fall 4 months ago.        History of Presenting Illness (HPI):     Nieves Murphy is a pleasant 69 y.o. female with a past medical history of diabetes, arthritis, anxiety, hypothyroidism and chronic lower extremity DVT, who presented to the ED on 9/17/2024 with right leg swelling and pain.    According to the patient she was in her usual state of health and doing well until 4 months ago when she had a fall.  At that time she felt like her right leg bone was broken but she did not come to the hospital or get checked.  She feels like the swelling has been present since then and it has been constant.  She was okay with the swelling until a few days ago when she ran into a table and hurt her right leg.  Since then, the swelling has been worsening, and it is associated with pain.  She denies any other associated symptoms including chest pain or shortness of breath.  She has had a previous history of left lower extremity DVT, (which she is unable to recall how many years ago) but has been on Xarelto since then.  She claims that she is completely compliant with all her medications and do not miss any of her pills.  She also adds that she has been bedridden since 2010 due to worsening arthritis and uses a wheelchair to ambulate.    Her caregiver adds that for the past few weeks, when she sits in her chair her foot starts swelling. He also noticed some tender points on his on her right lower leg.    ED Management:  - Vital signs were  checked which were stable.  - Patient started on Lovenox 120 mg.  - X-ray tibia/fibula done which was unremarkable.  - Her CBC was found to be normal, Chem panel showed low potassium levels.  - Her pro BNP was found to be 719.  - CT for PE ordered that showed cardiomegaly and coronary calcification.  - Ultrasound bilateral lower extremity showed acute to subacute, partially occlusive thrombus of the right distal common femoral vein. Acute to subacute, partially occlusive thrombus in the right proximal,middle and distal femoral vein.    She was admitted on the floor for further assessment and management of right lower extremity DVT    I discussed the plan of care with patient.    Review of Systems  Review of Systems   Constitutional: Negative.    HENT: Negative.     Eyes: Negative.    Respiratory: Negative.     Cardiovascular:  Positive for leg swelling. Negative for chest pain, palpitations, orthopnea, claudication and PND.   Gastrointestinal: Negative.    Genitourinary: Negative.    Musculoskeletal: Negative.    Skin: Negative.    Neurological: Negative.    Endo/Heme/Allergies: Negative.    Psychiatric/Behavioral: Negative.         Past Medical History   has a past medical history of Anxiety, Arthritis, Dental disorder (2016), DJD (degenerative joint disease), DVT (deep venous thrombosis) ([facility]) (2-2016), Gynecological disorder, Heart burn (2016), Indigestion, Obesity, and Restless leg syndrome.    Surgical History   has a past surgical history that includes other abdominal surgery; vaginal hysterectomy scope total (1/3/2017); anterior and posterior repair (1/3/2017); enterocele repair (1/3/2017); vaginal suspension (1/3/2017); bladder sling female (1/3/2017); cystoscopy (1/3/2017); anterior and posterior repair (7/18/2017); enterocele repair (7/18/2017); vaginal suspension (7/18/2017); and bladder sling female (7/18/2017).   -Repair of Femur fracture 2-3 years ago     Family History    Family history reviewed with  patient.     - Mother: History of lower extremity DVT- multiple, Hypertension   - Father: Unremarkable     Social History    Tobacco: None  Alcohol: None  Recreational drugs (illegal or prescription): Smokes pot regularly   Employment: Unemployed  Living Situation: Lives at home with her care giver  Recent Travel: None  Primary Care Provider: Reviewed    Other (stressors, spirituality, exposures): None    Allergies  Allergies   Allergen Reactions    Asa [Aspirin] Unspecified     GI DISCOMFORT    Pcn [Penicillins] Unspecified     GI DISCOMFORT       Medications  Prior to Admission Medications   Prescriptions Last Dose Informant Patient Reported? Taking?   Semaglutide,0.25 or 0.5MG/DOS, (OZEMPIC, 0.25 OR 0.5 MG/DOSE,) 2 MG/1.5ML Solution Pen-injector 9/12/2024 at [facility] Patient's Home Pharmacy Yes No   Sig: Inject 0.25 mg under the skin every 7 days.   albuterol 108 (90 Base) MCG/ACT Aero Soln inhalation aerosol [facility] at [facility] Patient's Home Pharmacy Yes Yes   Sig: Inhale 2 Puffs every 6 hours as needed for Shortness of Breath.   atorvastatin (LIPITOR) 10 MG Tab 9/16/2024 at [facility] Patient's Home Pharmacy Yes Yes   Sig: Take 10 mg by mouth every day.   gabapentin (NEURONTIN) 400 MG Cap 9/16/2024 at [facility] Patient's Home Pharmacy Yes Yes   Sig: Take 400 mg by mouth 3 times a day.   levothyroxine (SYNTHROID) 125 MCG Tab 9/16/2024 at [facility] Patient's Home Pharmacy Yes Yes   Sig: Take 125 mcg by mouth every morning on an empty stomach.   metoprolol SR (TOPROL XL) 25 MG TABLET SR 24 HR 9/16/2024 at [facility] Patient's Home Pharmacy No Yes   Sig: Take 1 Tablet by mouth every day.   pantoprazole (PROTONIX) 20 MG tablet 9/16/2024 at [facility] Patient's Home Pharmacy Yes Yes   Sig: Take 20 mg by mouth every day.   rivaroxaban (XARELTO) 20 MG Tab tablet 9/16/2024 at [facility] Patient's Home Pharmacy No Yes   Sig: Take 1 Tab by mouth with dinner.   traZODone (DESYREL) 50 MG Tab 9/16/2024 at [facility] Patient's Home Pharmacy Yes Yes   Sig: Take 100 mg by mouth every  evening. May take addition 2 tablets PRN    100 mg = 2 tabs      Facility-Administered Medications: None       Physical Exam  Temp:  [35.9 °C (96.6 °F)] 35.9 °C (96.6 °F)  Pulse:  [61-66] 62  Resp:  [15-19] 17  BP: (119-137)/(57-80) 119/57  SpO2:  [92 %-94 %] 93 %  Blood Pressure : 119/57   Temperature: 35.9 °C (96.6 °F)   Pulse: 62   Respiration: 17   Pulse Oximetry: 93 %       Physical Exam  Constitutional:       General: She is not in acute distress.     Appearance: She is obese. She is not ill-appearing, toxic-appearing or diaphoretic.   HENT:      Head: Normocephalic and atraumatic.      Right Ear: Tympanic membrane, ear canal and external ear normal.      Left Ear: Tympanic membrane, ear canal and external ear normal.      Nose: Nose normal.      Mouth/Throat:      Mouth: Mucous membranes are moist.   Eyes:      Extraocular Movements: Extraocular movements intact.      Pupils: Pupils are equal, round, and reactive to light.   Cardiovascular:      Rate and Rhythm: Normal rate and regular rhythm.      Pulses: Normal pulses.      Heart sounds: Normal heart sounds.   Pulmonary:      Effort: Pulmonary effort is normal.      Breath sounds: Normal breath sounds.   Abdominal:      General: Bowel sounds are normal.      Palpations: Abdomen is soft.   Musculoskeletal:         General: Normal range of motion.      Cervical back: Normal range of motion and neck supple.   Skin:     General: Skin is warm.      Capillary Refill: Capillary refill takes less than 2 seconds.   Neurological:      Mental Status: She is alert. Mental status is at baseline.   Psychiatric:         Mood and Affect: Mood normal.         Behavior: Behavior normal.         Thought Content: Thought content normal.         Judgment: Judgment normal.         Laboratory:  Recent Labs     09/17/24  1105   WBC 6.2   RBC 4.26   HEMOGLOBIN 11.5*   HEMATOCRIT 37.4   MCV 87.8   MCH 27.0   MCHC 30.7*   RDW 62.9*   PLATELETCT 276   MPV 10.0     Recent Labs      "09/17/24  1105   SODIUM 141   POTASSIUM 3.1*   CHLORIDE 104   CO2 27   GLUCOSE 95   BUN 7*   CREATININE 0.62   CALCIUM 7.8*     Recent Labs     09/17/24  1105   ALTSGPT 8   ASTSGOT 22   ALKPHOSPHAT 108*   TBILIRUBIN 0.6   GLUCOSE 95         Recent Labs     09/17/24  1105   NTPROBNP 710*         No results for input(s): \"TROPONINT\" in the last 72 hours.    Imaging:  CT-CTA CHEST PULMONARY ARTERY W/ RECONS   Final Result      1.  No evidence of pulmonary embolism.   2.  L2 vertebral body superior endplate collapse, partly in the field-of-view.   3.  Cardiomegaly.   4.  Coronary calcification.   5.  No acute infiltrates.      Fleischner Society pulmonary nodule recommendations:         US-EXTREMITY VENOUS LOWER BILAT   Final Result      DX-TIBIA AND FIBULA RIGHT   Final Result      Demineralization, postsurgical and degenerative change without acute fracture or malalignment of the tibia and fibula.          X-Ray:  I have personally reviewed the images and compared with prior images.  EKG:  I have personally reviewed the images and compared with prior images.    Assessment/Plan:  Problem Representation: I anticipate this patient will require at least two midnights for appropriate medical management, necessitating inpatient admission because of her bilateral lower extremity DVT     Patient will need a Med/Surg bed on MEDICAL service .  The need is secondary to her current medical condition.    * Leg swelling- (present on admission)  Assessment & Plan  Patient denies shortness of breath, chest pain, orthopnea, dyspnea.  - No signs of heart failure.  - Probably secondary to DVT  - Pro .  - Daily weights.  - I/O charting  - Daily CMP   - TSH ordered      DVT (deep venous thrombosis) ([facility])- (present on admission)  Assessment & Plan  Patient presented with increased bilateral lower extremity swelling 09/17   - Ultrasound bilateral lower extremity 09/17- showed acute to subacute, partially occlusive thrombus of the " right distal common femoral vein. Acute to subacute, partially occlusive thrombus in the right proximal,middle and distal femoral vein.  -Was previously on Xarelto.  Will hold.  09/17  - Started on Lovenox in the  mg.  Will hold 09/17  - Will start on heparin drip from next dose. 09/17  - CT for PE negative  - PT/INR monitoring      Atrial fibrillation (HCC)- (present on admission)  Assessment & Plan  Patient does not remember taking any cardiac medications.  - Metoprolol on hold.  - Will monitor  - Will continue telemetry      Mood disorder (HCC)- (present on admission)  Assessment & Plan  Will continue home medications.        VTE prophylaxis: heparin ppx

## 2024-09-17 NOTE — ASSESSMENT & PLAN NOTE
Patient denies shortness of breath, chest pain, orthopnea, dyspnea.  - No signs of heart failure currently. Recent Echo in April 24, EF 60% and elevated right sided pressures. RVSF 62 mmHg.  - Probably secondary to DVT  - Pro .  - Daily weights.  - I/O charting  - PT/OT  - BMP if needed   - 09/23: Swellings resolved but pain present.

## 2024-09-17 NOTE — ED TRIAGE NOTES
Chief Complaint   Patient presents with    Leg Swelling     Patient complains of bilateral leg swelling x 4 months with right > left. Patient reports having a fall 4 months ago.         Patient educated on triage process. Informed to notified ED staff of change in symptoms.     Vitals:    09/17/24 0944   BP: 136/80   Pulse: 66   Resp: 16   Temp: 35.9 °C (96.6 °F)   SpO2: 94%

## 2024-09-17 NOTE — H&P
[facility]  Senior Admission Note      HPI  Nieves Murphy is a 69 y.o. female with a past medical history that includes anxiety, diabetes type 2, hypothyroidism past medical history of chronic lower extremity DVT was on Xarelto who presented on 9/17/2024 with swelling over the right lower extremity especially the right one.     The patient mentioned that approximately 4 months ago she sustained a ground-level fall for which she did not went to the hospital but noticed that she started getting swollen this order right lower extremity.  Patient mentioned that approximately 2 days ago she hit her right lower extremity by accident and since then she started feeling pain in also increasing edema, until the day of admission where she decided to go to the ED for evaluation.  Current to the patient she states that she is coming back all her medications but she was Xarelto to prevent DVTs which she mention she had in the past.    Emergency department the patient was evaluated:  Vital signs were stable  Ultrasound of the lower extremity was showing:   Right lower extremity.    Acute to subacute, partially occlusive thrombus of the distal common femoral vein.   Acute to subacute, partially occlusive thrombus in the proximal, middle and distal femoral vein.    X-ray of the tibia was negative for fracture and CT CTA was negative for PE.    CBC was within normal limits and CMP positive for potassium of 3.1, BNP of 700 but not presenting shortness of breath.    Physical Exam  Physical Exam  Constitutional:       General: She is not in acute distress.     Appearance: She is not toxic-appearing.   Eyes:      General:         Right eye: No discharge.         Left eye: No discharge.   Cardiovascular:      Rate and Rhythm: Normal rate.      Heart sounds: No murmur heard.     No gallop.   Pulmonary:      Effort: No respiratory distress.      Breath sounds: No stridor. No wheezing.   Abdominal:      General: There is no distension.       Palpations: There is no mass.      Tenderness: There is no abdominal tenderness.   Musculoskeletal:         General: Tenderness (on right lower extremity) present.      Cervical back: No rigidity.      Right lower leg: Edema present.      Left lower leg: Edema present.   Lymphadenopathy:      Cervical: No cervical adenopathy.   Skin:     Coloration: Skin is not jaundiced or pale.      Findings: No bruising.   Neurological:      General: No focal deficit present.      Mental Status: She is oriented to person, place, and time.         Assessment  Ms. Pickett is a 69-year-old female who came with edema of the lower extremities after having ultrasound for DVT it was fine although the patient had a DVT in over the right lower extremity was started on Lovenox and oxygen to heparin drip due to weight.  Patient not presenting any other symptoms.    Plan    # DVT of right lower extremity  Evidenced by ultrasound DVT with Acute to subacute, partially occlusive thrombus of the distal common femoral vein.   Acute to subacute, partially occlusive thrombus in the proximal, middle and distal femoral vein.   -Initially started on Lovenox DVT therapeutic by weight, elevated BMI.  -Instead of giving second dose of Lovenox in 12 hours will start with heparin drip for DVT treatment.  -Protocol for DVT anticoagulation  -On telemetry    # Atrial fibrillation  Patient with past medical history of atrial fibrillation heart rate was stable during admission not RVR.  -Metoprolol on hold, patient does not recall taking this medication  -Continue to monitor with telemetry  -Patient initially given Lovenox and then transition to heparin drip for anticoagulation    # Leg swelling  Most likely secondary to DVT, patient also presented BNP of 700 but not presenting any shortness of breath and on room air, swelling is chronic  -Daily weight  -Continue to monitor CMP  -TSH ordered  -No indication of echocardiogram at the moment    #DMII  Patient  currently on Ozempic, we will start with sliding scale  -Sliding scale insulin  -Hypoglycemia protocol    #Hypokalemia   Patient initially with potassium of 3.1 which was repleted  -Continue to monitor CMP and replete as needed    # Mood disorder  -Patient will continue with home medications    # Morbid obesity  Patient with a BMI of 47  -Consider nutrition follow-up as an outpatient  -Consider bariatric surgeon outpatient follow-up    Discussed with my attending physician, Jeremy Strange M.D..  ...

## 2024-09-17 NOTE — ED NOTES
Med Rec complete per pharmacy   Allergies reviewed  Antibiotics in the past 30 days:no  Anticoagulant in past 14 days:yes  Anticoagulant:Xarelto 20 mg Last dose:09/16/24  Pharmacy patient utilizes:[facility] [place]    Pt was unable to verify names of medications. Pt states she took her medications yesterday    Verified current medications with pharmacy

## 2024-09-17 NOTE — ED NOTES
Reached out to [facility] resident Christy Guadalupe regarding repeat order for Kdur 40 mEq. Awaiting response thru voalte. Report given to Becca BARRON of [id number].

## 2024-09-17 NOTE — ED NOTES
Bedside report received from off going RN: Donna, assumed care of patient.  POC discussed with patient. Call light within reach, all needs addressed at this time.       Fall risk interventions in place: Move the patient closer to the nurse's station, Patient's personal possessions are with in their safe reach, Place fall risk sign on patient's door, and Keep floor surfaces clean and dry (all applicable per [place] Fall risk assessment)   Continuous monitoring: Cardiac Leads, Pulse Ox, or Blood Pressure  IVF/IV medications: Not Applicable   Oxygen: Room Air  Bedside sitter: Not Applicable   Isolation: Not Applicable

## 2024-09-17 NOTE — ASSESSMENT & PLAN NOTE
Patient presented with increased bilateral lower extremity swelling 09/17   - Ultrasound bilateral lower extremity 09/17- showed acute to subacute, partially occlusive thrombus of the right distal common femoral vein. Acute to subacute, partially occlusive thrombus in the right proximal,middle and distal femoral vein.  - Was previously on Xarelto.  Will hold 09/17  - Resumed Xarelto 15mg BID since 09/20.  - Started on Lovenox in the  mg.  Held on 09/17  - Heparin Infusion started 09/18. Discontinued 09/20  - CT for PE negative  - Hematology consulted for possible Xarelto failure - Ruled out 09/18. Signed off

## 2024-09-18 PROBLEM — E03.9 HYPOTHYROIDISM: Status: ACTIVE | Noted: 2024-09-18

## 2024-09-18 LAB
ALBUMIN SERPL BCP-MCNC: 2.9 G/DL (ref 3.2–4.9)
ALBUMIN/GLOB SERPL: 1 G/DL
ALP SERPL-CCNC: 99 U/L (ref 30–99)
ALT SERPL-CCNC: 9 U/L (ref 2–50)
ANION GAP SERPL CALC-SCNC: 10 MMOL/L (ref 7–16)
ANION GAP SERPL CALC-SCNC: 8 MMOL/L (ref 7–16)
APTT PPP: 175 SEC (ref 24.7–36)
APTT PPP: 34.5 SEC (ref 24.7–36)
APTT PPP: >240 SEC (ref 24.7–36)
AST SERPL-CCNC: 21 U/L (ref 12–45)
BASOPHILS # BLD AUTO: 1.2 % (ref 0–1.8)
BASOPHILS # BLD: 0.06 K/UL (ref 0–0.12)
BILIRUB SERPL-MCNC: 0.5 MG/DL (ref 0.1–1.5)
BUN SERPL-MCNC: 5 MG/DL (ref 8–22)
BUN SERPL-MCNC: 5 MG/DL (ref 8–22)
CALCIUM ALBUM COR SERPL-MCNC: 9 MG/DL (ref 8.5–10.5)
CALCIUM SERPL-MCNC: 8.1 MG/DL (ref 8.5–10.5)
CALCIUM SERPL-MCNC: 8.1 MG/DL (ref 8.5–10.5)
CHLORIDE SERPL-SCNC: 105 MMOL/L (ref 96–112)
CHLORIDE SERPL-SCNC: 105 MMOL/L (ref 96–112)
CHOLEST SERPL-MCNC: 94 MG/DL (ref 100–199)
CK SERPL-CCNC: 63 U/L (ref 0–154)
CO2 SERPL-SCNC: 25 MMOL/L (ref 20–33)
CO2 SERPL-SCNC: 26 MMOL/L (ref 20–33)
CREAT SERPL-MCNC: 0.48 MG/DL (ref 0.5–1.4)
CREAT SERPL-MCNC: 0.55 MG/DL (ref 0.5–1.4)
EOSINOPHIL # BLD AUTO: 0.38 K/UL (ref 0–0.51)
EOSINOPHIL NFR BLD: 7.3 % (ref 0–6.9)
ERYTHROCYTE [DISTWIDTH] IN BLOOD BY AUTOMATED COUNT: 63 FL (ref 35.9–50)
EST. AVERAGE GLUCOSE BLD GHB EST-MCNC: 105 MG/DL
GFR SERPLBLD CREATININE-BSD FMLA CKD-EPI: 101 ML/MIN/1.73 M 2
GFR SERPLBLD CREATININE-BSD FMLA CKD-EPI: 98 ML/MIN/1.73 M 2
GLOBULIN SER CALC-MCNC: 2.9 G/DL (ref 1.9–3.5)
GLUCOSE BLD STRIP.AUTO-MCNC: 100 MG/DL (ref 65–99)
GLUCOSE BLD STRIP.AUTO-MCNC: 87 MG/DL (ref 65–99)
GLUCOSE BLD STRIP.AUTO-MCNC: 94 MG/DL (ref 65–99)
GLUCOSE SERPL-MCNC: 82 MG/DL (ref 65–99)
GLUCOSE SERPL-MCNC: 96 MG/DL (ref 65–99)
HBA1C MFR BLD: 5.3 % (ref 4–5.6)
HCT VFR BLD AUTO: 34.4 % (ref 37–47)
HDLC SERPL-MCNC: 54 MG/DL
HGB BLD-MCNC: 10.9 G/DL (ref 12–16)
IMM GRANULOCYTES # BLD AUTO: 0.02 K/UL (ref 0–0.11)
IMM GRANULOCYTES NFR BLD AUTO: 0.4 % (ref 0–0.9)
INR PPP: 1.48 (ref 0.87–1.13)
LDLC SERPL CALC-MCNC: 26 MG/DL
LYMPHOCYTES # BLD AUTO: 2.05 K/UL (ref 1–4.8)
LYMPHOCYTES NFR BLD: 39.4 % (ref 22–41)
MCH RBC QN AUTO: 27.5 PG (ref 27–33)
MCHC RBC AUTO-ENTMCNC: 31.7 G/DL (ref 32.2–35.5)
MCV RBC AUTO: 86.9 FL (ref 81.4–97.8)
MONOCYTES # BLD AUTO: 0.42 K/UL (ref 0–0.85)
MONOCYTES NFR BLD AUTO: 8.1 % (ref 0–13.4)
NEUTROPHILS # BLD AUTO: 2.27 K/UL (ref 1.82–7.42)
NEUTROPHILS NFR BLD: 43.6 % (ref 44–72)
NRBC # BLD AUTO: 0 K/UL
NRBC BLD-RTO: 0 /100 WBC (ref 0–0.2)
PLATELET # BLD AUTO: 242 K/UL (ref 164–446)
PMV BLD AUTO: 10 FL (ref 9–12.9)
POTASSIUM SERPL-SCNC: 3.4 MMOL/L (ref 3.6–5.5)
POTASSIUM SERPL-SCNC: 4.5 MMOL/L (ref 3.6–5.5)
POTASSIUM SERPL-SCNC: 4.6 MMOL/L (ref 3.6–5.5)
PROT SERPL-MCNC: 5.8 G/DL (ref 6–8.2)
PROTHROMBIN TIME: 17.9 SEC (ref 12–14.6)
RBC # BLD AUTO: 3.96 M/UL (ref 4.2–5.4)
SODIUM SERPL-SCNC: 138 MMOL/L (ref 135–145)
SODIUM SERPL-SCNC: 141 MMOL/L (ref 135–145)
T4 FREE SERPL-MCNC: 0.66 NG/DL (ref 0.93–1.7)
TRIGL SERPL-MCNC: 68 MG/DL (ref 0–149)
TSH SERPL DL<=0.005 MIU/L-ACNC: 72.5 UIU/ML (ref 0.38–5.33)
UFH PPP CHRO-ACNC: >1.1 IU/ML
UFH PPP CHRO-ACNC: >1.1 IU/ML
WBC # BLD AUTO: 5.2 K/UL (ref 4.8–10.8)

## 2024-09-18 PROCEDURE — 84439 ASSAY OF FREE THYROXINE: CPT

## 2024-09-18 PROCEDURE — 85610 PROTHROMBIN TIME: CPT

## 2024-09-18 PROCEDURE — 700111 HCHG RX REV CODE 636 W/ 250 OVERRIDE (IP): Performed by: INTERNAL MEDICINE

## 2024-09-18 PROCEDURE — 97166 OT EVAL MOD COMPLEX 45 MIN: CPT

## 2024-09-18 PROCEDURE — A9270 NON-COVERED ITEM OR SERVICE: HCPCS

## 2024-09-18 PROCEDURE — 700102 HCHG RX REV CODE 250 W/ 637 OVERRIDE(OP)

## 2024-09-18 PROCEDURE — 770020 HCHG ROOM/CARE - TELE (206)

## 2024-09-18 PROCEDURE — 85730 THROMBOPLASTIN TIME PARTIAL: CPT | Mod: 91

## 2024-09-18 PROCEDURE — 80061 LIPID PANEL: CPT

## 2024-09-18 PROCEDURE — 83036 HEMOGLOBIN GLYCOSYLATED A1C: CPT

## 2024-09-18 PROCEDURE — 700111 HCHG RX REV CODE 636 W/ 250 OVERRIDE (IP)

## 2024-09-18 PROCEDURE — A9270 NON-COVERED ITEM OR SERVICE: HCPCS | Mod: JZ

## 2024-09-18 PROCEDURE — 82550 ASSAY OF CK (CPK): CPT

## 2024-09-18 PROCEDURE — 97163 PT EVAL HIGH COMPLEX 45 MIN: CPT

## 2024-09-18 PROCEDURE — 80048 BASIC METABOLIC PNL TOTAL CA: CPT

## 2024-09-18 PROCEDURE — 85520 HEPARIN ASSAY: CPT | Mod: 91

## 2024-09-18 PROCEDURE — 80053 COMPREHEN METABOLIC PANEL: CPT

## 2024-09-18 PROCEDURE — 99232 SBSQ HOSP IP/OBS MODERATE 35: CPT | Mod: GC | Performed by: INTERNAL MEDICINE

## 2024-09-18 PROCEDURE — 36415 COLL VENOUS BLD VENIPUNCTURE: CPT

## 2024-09-18 PROCEDURE — 84132 ASSAY OF SERUM POTASSIUM: CPT

## 2024-09-18 PROCEDURE — 84443 ASSAY THYROID STIM HORMONE: CPT

## 2024-09-18 PROCEDURE — 85025 COMPLETE CBC W/AUTO DIFF WBC: CPT

## 2024-09-18 PROCEDURE — 82962 GLUCOSE BLOOD TEST: CPT | Mod: 91

## 2024-09-18 PROCEDURE — 700102 HCHG RX REV CODE 250 W/ 637 OVERRIDE(OP): Mod: JZ

## 2024-09-18 RX ORDER — POTASSIUM CHLORIDE 1500 MG/1
40 TABLET, EXTENDED RELEASE ORAL ONCE
Status: ACTIVE | OUTPATIENT
Start: 2024-09-18 | End: 2024-09-19

## 2024-09-18 RX ORDER — HEPARIN SODIUM 1000 [USP'U]/ML
3200 INJECTION, SOLUTION INTRAVENOUS; SUBCUTANEOUS PRN
Status: DISCONTINUED | OUTPATIENT
Start: 2024-09-18 | End: 2024-09-20

## 2024-09-18 RX ORDER — HEPARIN SODIUM 5000 [USP'U]/100ML
INJECTION, SOLUTION INTRAVENOUS CONTINUOUS
Status: DISCONTINUED | OUTPATIENT
Start: 2024-09-18 | End: 2024-09-20

## 2024-09-18 RX ORDER — POTASSIUM CHLORIDE 1500 MG/1
40 TABLET, EXTENDED RELEASE ORAL ONCE
Status: DISCONTINUED | OUTPATIENT
Start: 2024-09-18 | End: 2024-09-18

## 2024-09-18 RX ORDER — POTASSIUM CHLORIDE 1500 MG/1
40 TABLET, EXTENDED RELEASE ORAL
Status: COMPLETED | OUTPATIENT
Start: 2024-09-18 | End: 2024-09-18

## 2024-09-18 RX ORDER — OXYCODONE HYDROCHLORIDE 5 MG/1
5 TABLET ORAL EVERY 4 HOURS PRN
Status: COMPLETED | OUTPATIENT
Start: 2024-09-18 | End: 2024-09-19

## 2024-09-18 RX ADMIN — TRAZODONE HYDROCHLORIDE 100 MG: 100 TABLET ORAL at 21:11

## 2024-09-18 RX ADMIN — LEVOTHYROXINE SODIUM 125 MCG: 0.12 TABLET ORAL at 05:13

## 2024-09-18 RX ADMIN — POTASSIUM CHLORIDE 40 MEQ: 1500 TABLET, EXTENDED RELEASE ORAL at 02:18

## 2024-09-18 RX ADMIN — OMEPRAZOLE 20 MG: 20 CAPSULE, DELAYED RELEASE ORAL at 05:13

## 2024-09-18 RX ADMIN — POTASSIUM CHLORIDE 40 MEQ: 1500 TABLET, EXTENDED RELEASE ORAL at 05:13

## 2024-09-18 RX ADMIN — GABAPENTIN 400 MG: 400 CAPSULE ORAL at 12:15

## 2024-09-18 RX ADMIN — ATORVASTATIN CALCIUM 10 MG: 10 TABLET, FILM COATED ORAL at 05:13

## 2024-09-18 RX ADMIN — POTASSIUM CHLORIDE 40 MEQ: 1500 TABLET, EXTENDED RELEASE ORAL at 01:02

## 2024-09-18 RX ADMIN — HEPARIN SODIUM 18 UNITS/KG/HR: 5000 INJECTION, SOLUTION INTRAVENOUS at 03:08

## 2024-09-18 RX ADMIN — GABAPENTIN 400 MG: 400 CAPSULE ORAL at 17:07

## 2024-09-18 RX ADMIN — GABAPENTIN 400 MG: 400 CAPSULE ORAL at 05:13

## 2024-09-18 RX ADMIN — OXYCODONE HYDROCHLORIDE 5 MG: 5 TABLET ORAL at 19:15

## 2024-09-18 RX ADMIN — HEPARIN SODIUM 1200 UNITS/HR: 5000 INJECTION, SOLUTION INTRAVENOUS at 12:12

## 2024-09-18 ASSESSMENT — FIBROSIS 4 INDEX: FIB4 SCORE: 2.02

## 2024-09-18 ASSESSMENT — PAIN DESCRIPTION - PAIN TYPE
TYPE: ACUTE PAIN
TYPE: ACUTE PAIN

## 2024-09-18 ASSESSMENT — COGNITIVE AND FUNCTIONAL STATUS - GENERAL
STANDING UP FROM CHAIR USING ARMS: A LITTLE
SUGGESTED CMS G CODE MODIFIER DAILY ACTIVITY: CJ
DAILY ACTIVITIY SCORE: 21
TOILETING: A LITTLE
SUGGESTED CMS G CODE MODIFIER DAILY ACTIVITY: CJ
HELP NEEDED FOR BATHING: A LITTLE
TOILETING: A LITTLE
WALKING IN HOSPITAL ROOM: A LITTLE
SUGGESTED CMS G CODE MODIFIER MOBILITY: CJ
WALKING IN HOSPITAL ROOM: A LITTLE
DAILY ACTIVITIY SCORE: 22
DRESSING REGULAR LOWER BODY CLOTHING: A LITTLE
MOBILITY SCORE: 22
DRESSING REGULAR LOWER BODY CLOTHING: A LITTLE
MOBILITY SCORE: 22
STANDING UP FROM CHAIR USING ARMS: A LITTLE
SUGGESTED CMS G CODE MODIFIER MOBILITY: CJ

## 2024-09-18 ASSESSMENT — ENCOUNTER SYMPTOMS
EYES NEGATIVE: 1
GASTROINTESTINAL NEGATIVE: 1
FALLS: 1
CARDIOVASCULAR NEGATIVE: 1
CONSTITUTIONAL NEGATIVE: 1
WEAKNESS: 1
RESPIRATORY NEGATIVE: 1
PSYCHIATRIC NEGATIVE: 1

## 2024-09-18 ASSESSMENT — ACTIVITIES OF DAILY LIVING (ADL): TOILETING: INDEPENDENT

## 2024-09-18 NOTE — CARE PLAN
The patient is Stable - Low risk of patient condition declining or worsening    Shift Goals  Clinical Goals: Hepatin gtt, Monitor labs and vital signs  Patient Goals: rest and comfort  Family Goals: JANETH    Progress made toward(s) clinical / shift goals:        Problem: Pain - Standard  Goal: Alleviation of pain or a reduction in pain to the patient’s comfort goal  Outcome: Progressing  Note: With complain of right leg pain, Tylenol given as ordered. Encouraged to deep breathing exercises and repositioning, effective, patient able to sleep at night. Fall precaution in placed, call light within reach.      Problem: Fall Risk  Goal: Patient will remain free from falls  Outcome: Progressing  Note: Patient remained free from falls. All fall precautions in place. Patient educated the need to call when needed assistance, patient verbalized understanding. Call light and personal belongings within reach.         Patient is not progressing towards the following goals:

## 2024-09-18 NOTE — PROGRESS NOTES
Latest labs shows K-3.2, BUN-5. Crea 0.46, calcium-7.9, latest vitals signs shows, BP- 99/48 mmHg. [place]- 59 , afebrile on Room at 93%, Relayed to Dr. Pappas, with order of potassium 40 meq oral for 2 doses.       Received a critical value of Haparin Xa of > 1.10, go ahead with the infusion as per verify pharmacist jan.

## 2024-09-18 NOTE — CARE PLAN
The patient is Stable - Low risk of patient condition declining or worsening    Shift Goals  Clinical Goals: heparin drip, labs  Patient Goals: comfort  Family Goals: isabelle    Progress made toward(s) clinical / shift goals:    Problem: Knowledge Deficit - Standard  Goal: Patient and family/care givers will demonstrate understanding of plan of care, disease process/condition, diagnostic tests and medications  Description: Target End Date:  1-3 days or as soon as patient condition allows    Document in Patient Education    1.  Patient and family/caregiver oriented to unit, equipment, visitation policy and means for communicating concern  2.  Complete/review Learning Assessment  3.  Assess knowledge level of disease process/condition, treatment plan, diagnostic tests and medications  4.  Explain disease process/condition, treatment plan, diagnostic tests and medications  Outcome: Progressing  Note: Pt actively participates in POC. Pt and board updated, all questions and concerns answered. Pt encouraged to voice all questions and concerns.     Problem: Fall Risk  Goal: Patient will remain free from falls  Description: Target End Date:  Prior to discharge or change in level of care    Document interventions on the [place] Fall Risk Assessment    1.  Assess for fall risk factors  2.  Implement fall precautions  Outcome: Progressing  Note: Safety and fall education given. All fall precautions are in place with belongings at bedside table. Needs are tended to. Educated to use call light for assistance. Pt verbalized understanding. Patient able to stand with PT/OT with a little assistance.

## 2024-09-18 NOTE — PROGRESS NOTES
[facility] Internal Medicine Daily Progress Note    Date of Service  9/18/2024    [facility] Team: R IM Green Team   Attending: Jeremy Strange M.d.  Senior Resident: Dr. aLrry Castaneda  Intern:  Dr. Christy Guadalupe  Contact Number: [phone]    Chief Complaint    Nieves Murphy is a 69 y.o. female admitted 9/17/2024 with Leg Swelling     Hospital Course    Nieves Murphy is a pleasant 69 y.o. female with a past medical history of diabetes, arthritis, anxiety, hypothyroidism and chronic lower extremity DVT, who presented to the ED on 9/17/2024 with right leg swelling and pain.     According to the patient she was in her usual state of health and doing well until 4 months ago when she had a fall.  At that time she felt like her right leg bone was broken but she did not come to the hospital or get checked.  She feels like the swelling has been present since then and it has been constant.  She was okay with the swelling until a few days ago when she ran into a table and hurt her right leg.  Since then, the swelling has been worsening, and it is associated with pain.  She denies any other associated symptoms including chest pain or shortness of breath.  She has had a previous history of left lower extremity DVT, (which she is unable to recall how many years ago) but has been on Xarelto since then.  She claims that she is completely compliant with all her medications and do not miss any of her pills.  She also adds that she has been bedridden since 2010 due to worsening arthritis and uses a wheelchair to ambulate.     Her caregiver adds that for the past few weeks, when she sits in her chair her foot starts swelling. He also noticed some tender points on his on her right lower leg.    ED Management:  - Vital signs were checked which were stable.  - Patient started on Lovenox 120 mg.  - X-ray tibia/fibula done which was unremarkable.  - Her CBC was found to be normal, Chem panel showed low potassium levels.  - Her pro BNP was  found to be 719.  - CT for PE ordered that showed cardiomegaly and coronary calcification.  - Ultrasound bilateral lower extremity showed acute to subacute, partially occlusive thrombus of the right distal common femoral vein. Acute to subacute, partially occlusive thrombus in the right proximal,middle and distal femoral vein.     She was admitted on the floor for further assessment and management of right lower extremity DVT      Interval Problem Update    Overnight events: Her potassium was found to be 3.4.  Replaced.     She states that she feels okay. She does not have any active complaints.  - She denies any chest pain, shortness of breath or leg pain.  - Heparin infusion was started at 2 AM 09/18.  - Her morning glucose was 94 Mg/dl.  Her HbA1c was found to be 5.3%.  - Her PT/INR is high probably secondary to Xarelto use.  - Her TSH was high, 72.5 and her free T4 was found to be 0.66.  When inquired about her taking thyroid medications she said that she is not aware of any thyroid medications that she takes.- Home dose of Levothyroxine resumed 09/18.  - Her vital signs remained stable.    - Since she is compliant with her medications, hematology consulted for possible Xarelto failure.    I have discussed this patient's plan of care and discharge plan at IDT rounds today with Case Management, Nursing, Nursing leadership, and other members of the IDT team.    Consultants/Specialty  Hematology    Code Status  Full Code    Disposition  The patient is not medically cleared for discharge to home or a post-acute facility.      Review of Systems  Review of Systems   Constitutional: Negative.    HENT: Negative.     Eyes: Negative.    Respiratory: Negative.     Cardiovascular: Negative.    Gastrointestinal: Negative.    Genitourinary: Negative.    Musculoskeletal:  Positive for falls.   Skin: Negative.    Neurological:  Positive for weakness.   Endo/Heme/Allergies: Negative.    Psychiatric/Behavioral: Negative.           Physical Exam  Temp:  [35.9 °C (96.6 °F)-36.4 °C (97.5 °F)] 36.1 °C (97 °F)  Pulse:  [56-67] 67  Resp:  [14-18] 18  BP: ()/(48-69) 128/63  SpO2:  [92 %-98 %] 95 %    Physical Exam  Constitutional:       Appearance: Normal appearance. She is obese.   HENT:      Head: Normocephalic and atraumatic.      Right Ear: Tympanic membrane, ear canal and external ear normal.      Left Ear: Tympanic membrane, ear canal and external ear normal.      Nose: Nose normal.      Mouth/Throat:      Mouth: Mucous membranes are moist.   Eyes:      Extraocular Movements: Extraocular movements intact.      Pupils: Pupils are equal, round, and reactive to light.   Cardiovascular:      Rate and Rhythm: Normal rate and regular rhythm.      Pulses: Normal pulses.      Heart sounds: Normal heart sounds.   Pulmonary:      Effort: Pulmonary effort is normal.      Breath sounds: Normal breath sounds.   Abdominal:      General: Bowel sounds are normal.      Palpations: Abdomen is soft.   Musculoskeletal:         General: Swelling and tenderness present.      Cervical back: Normal range of motion and neck supple.      Right lower leg: Edema present.      Left lower leg: Edema present.   Skin:     General: Skin is warm.      Capillary Refill: Capillary refill takes less than 2 seconds.   Neurological:      General: No focal deficit present.      Mental Status: She is alert. Mental status is at baseline.   Psychiatric:         Mood and Affect: Mood normal.         Behavior: Behavior normal.         Thought Content: Thought content normal.         Judgment: Judgment normal.         Fluids    Intake/Output Summary (Last 24 hours) at 9/18/2024 1234  Last data filed at 9/18/2024 0400  Gross per 24 hour   Intake 350 ml   Output 1150 ml   Net -800 ml       Laboratory  Recent Labs     09/17/24  1105 09/18/24  0206   WBC 6.2 5.2   RBC 4.26 3.96*   HEMOGLOBIN 11.5* 10.9*   HEMATOCRIT 37.4 34.4*   MCV 87.8 86.9   MCH 27.0 27.5   MCHC 30.7* 31.7*   RDW  62.9* 63.0*   PLATELETCT 276 242   MPV 10.0 10.0     Recent Labs     09/17/24  1105 09/17/24  2113 09/18/24  0206   SODIUM 141 141 141   POTASSIUM 3.1* 3.2* 3.4*   CHLORIDE 104 105 105   CO2 27 25 26   GLUCOSE 95 78 82   BUN 7* 5* 5*   CREATININE 0.62 0.46* 0.48*   CALCIUM 7.8* 7.9* 8.1*     Recent Labs     09/18/24  0206   APTT 34.5   INR 1.48*         Recent Labs     09/18/24  0206   TRIGLYCERIDE 68   HDL 54   LDL 26       Imaging  CT-CTA CHEST PULMONARY ARTERY W/ RECONS   Final Result      1.  No evidence of pulmonary embolism.   2.  L2 vertebral body superior endplate collapse, partly in the field-of-view.   3.  Cardiomegaly.   4.  Coronary calcification.   5.  No acute infiltrates.      Fleischner Society pulmonary nodule recommendations:         US-EXTREMITY VENOUS LOWER BILAT   Final Result      DX-TIBIA AND FIBULA RIGHT   Final Result      Demineralization, postsurgical and degenerative change without acute fracture or malalignment of the tibia and fibula.           Assessment/Plan  Problem Representation:    * Leg swelling- (present on admission)  Assessment & Plan  Patient denies shortness of breath, chest pain, orthopnea, dyspnea.  - No signs of heart failure.  - Probably secondary to DVT  - Pro .  - Daily weights.  - I/O charting  - Daily [facility]         DVT (deep venous thrombosis) (CMS-HCC)- (present on admission)  Assessment & Plan  Patient presented with increased bilateral lower extremity swelling 09/17   - Ultrasound bilateral lower extremity 09/17- showed acute to subacute, partially occlusive thrombus of the right distal common femoral vein. Acute to subacute, partially occlusive thrombus in the right proximal,middle and distal femoral vein.  -Was previously on Xarelto.  Will hold.  09/17  - Started on Lovenox in the  mg.  Will hold 09/17  - Heparin Infusion started 09/18  - CT for PE negative  - PT/INR monitoring  - Hematology consulted for possible Xarelto failure       Atrial  fibrillation (HCC)- (present on admission)  Assessment & Plan  Patient does not remember taking any cardiac medications.  - Metoprolol on hold.  - Will monitor on telemetry      Hypothyroidism- (present on admission)  Assessment & Plan  Her TSH is 72.5 and her free T4 is 0.66.  When inquired about her taking thyroid medications she said that she is not aware of any thyroid medications that she takes.  - Patient counseled   - Home dose of Levothyroxine resumed     Mood disorder (HCC)- (present on admission)  Assessment & Plan  Will continue home medications.         VTE prophylaxis: heparin ppx    I have performed a physical exam and reviewed and updated ROS and Plan today (9/18/2024). In review of yesterday's note (9/17/2024), there are no changes except as documented above.

## 2024-09-18 NOTE — THERAPY
Physical Therapy   Initial Evaluation     Patient Name: Nieves Murphy  Age:  69 y.o., Sex:  female  Medical Record #: [id number]  Today's Date: 9/18/2024     Precautions  Precautions: Fall Risk    Assessment  Pt presents with impaired activity tolerance, body habitus, and dynamic balance associated with chief complaint of right leg swelling in setting of DM, anxiety, chronic LE DVT now found to have new right femoral DVT on anticoagulation. Pt mobilizing quite well despite w/c status, is stand by assist for transfers and bed mobility, pts  at bedside briefly very concerned and asking appropriate questions; discussed elevation and ROM of ankle as it relates to edema control, skin actually looks great in area of abrasion. Functionally, pt near baseline and can return home when medically appropriate to do so; would currently benefit from home health for HEP aimed at circulation to supplement ROM due to w/c bound status, however should reach independence in 1-2 sessions.     Plan    Physical Therapy Initial Treatment Plan   Treatment Plan : Equipment, Bed Mobility, Family / Caregiver Training, Manual Therapy, Gait Training, Neuro Re-Education / Balance, Self Care / Home Evaluation, Therapeutic Activities, Stair Training, Therapeutic Exercise  Treatment Frequency: 3 Times per Week  Duration: Until Therapy Goals Met    DC Equipment Recommendations: None  Discharge Recommendations: Recommend home health for continued physical therapy services (if dc'ing today/tomorrow; if allowed one more acute PT visit will likely not need)        Abridged Subjective/Objective     09/18/24 1105   Prior Living Situation   Prior Services Continuous (24 Hour) Care Giving Per Service   Housing / Facility 1 Story Apartment / Condo   Steps Into Home 0   Steps In Home 0   Equipment Owned Front-Wheel Walker;Power Wheel Chair   Lives with - Patient's Self Care Capacity Unrelated Adult   Comments pt denies recent falls; bumped  her right leg into night stand starting her bruising; usually transfers herself out of her bed into power w/c reports all in working order; caregiver 2 hours daily and he stays in a bedroom in her house overnight; he works during day;   Prior Level of Functional Mobility   Bed Mobility Independent   Transfer Status Independent   Ambulation Dependent   Ambulation Distance a few steps   Cognition    Cognition / Consciousness WDL   Level of Consciousness Alert   Comments pleasant and cooperative; caregiver intermittently at bedside   Passive ROM Lower Body   Passive ROM Lower Body X   Comments limited by body habitus   Strength Lower Body   Lower Body Strength  X   Gross Strength Generalized Weakness, Equal Bilaterally   Comments no overpressure at right ankle   Sensation Lower Body   Lower Extremity Sensation   WDL   Balance Assessment   Sitting Balance (Static) Fair +   Sitting Balance (Dynamic) Fair   Standing Balance (Static) Fair   Standing Balance (Dynamic) Fair   Weight Shift Sitting Good   Weight Shift Standing Good   Comments B UE support in sitting/standing; no loss of balance with side stepping   Bed Mobility    Supine to Sit Modified Independent  (slight use of railing)   Sit to Supine Modified Independent  (increased time)   Gait Analysis   Comments a few lateral scoot steps along bed   Functional Mobility   Sit to Stand Standby Assist  (with FWW)   Bed, Chair, Wheelchair Transfer   (will need a recliner for LE elevation)   Edema / Skin Assessment   Edema / Skin  X   Comments bruising along shin from kicking recliner/table prior R>L edeman but both look better than admission notes   Short Term Goals    Short Term Goal # 1 Pt will be independence in HEP aimed at circulation within 6 visits to reduce clot risk.   Education Group   Role of Physical Therapist Patient Response Patient;Acceptance;Demonstration;Explanation;Verbal Demonstration;Action Demonstration   Additional Comments ROM DF bilaterally

## 2024-09-18 NOTE — ASSESSMENT & PLAN NOTE
Her TSH is 72.5 and her free T4 is 0.66.  When inquired about her taking thyroid medications she said that she is not aware of any thyroid medications that she takes.  - Patient counseled   - Home dose of Levothyroxine resumed

## 2024-09-18 NOTE — PROGRESS NOTES
4 Eyes Skin Assessment Completed by ANDERSON Higginbotham and ANDERSON Nair.    Head WDL  Ears WDL  Nose WDL  Mouth WDL  Neck WDL  Breast/Chest WDL  Shoulder Blades WDL  Spine Redness and Blanching  (R) Arm/Elbow/Hand WDL  (L) Arm/Elbow/Hand WDL  AbdomenWDL  Groin Redness and Non-Blanching  Scrotum/Coccyx/Buttocks Redness and Blanching  (R) Leg Bruising  (L) Leg WDL  (R) Heel/Foot/Toe Discoloration  (L) Heel/Foot/Toe WDL          Devices In Places Tele Box and Pulse Ox      Interventions In Place N/A    Possible Skin Injury No    Pictures Uploaded Into Epic Yes  Wound Consult Placed N/A  RN Wound Prevention Protocol Ordered No

## 2024-09-18 NOTE — PROGRESS NOTES
Telemetry Report:      Rhythm:    SR-SB   Heart Rate: 56-72  Ectopy:      Rare PVC       VT:  0.25         QRS:       0.13  QT:   0.44        Per [employer] from Monitor Room

## 2024-09-18 NOTE — CARE PLAN
The patient is Watcher - Medium risk of patient condition declining or worsening    Shift Goals  Clinical Goals: heparin gtt, monitor cardiac rhythm, monitor VS, monitor BLE swelling  Patient Goals: comfort, rest  Family Goals: JANETH    Progress made toward(s) clinical / shift goals:    Problem: Pain - Standard  Goal: Alleviation of pain or a reduction in pain to the patient’s comfort goal  Description: Target End Date:  Prior to discharge or change in level of care    Document on Vitals flowsheet    1.  Document pain using the appropriate pain scale per order or unit policy  2.  Educate and implement non-pharmacologic comfort measures (i.e. relaxation, distraction, massage, cold/heat therapy, etc.)  3.  Pain management medications as ordered  4.  Reassess pain after pain med administration per policy  5.  If opiods administered assess patient's response to pain medication is appropriate per POSS sedation scale  6.  Follow pain management plan developed in collaboration with patient and interdisciplinary team (including palliative care or pain specialists if applicable)  Outcome: Progressing  Note: Fall risk assessment   Fall precautions in place per unit standard and protocol; see flowsheet       Problem: Knowledge Deficit - Standard  Goal: Patient and family/care givers will demonstrate understanding of plan of care, disease process/condition, diagnostic tests and medications  Description: Target End Date:  1-3 days or as soon as patient condition allows    Document in Patient Education    1.  Patient and family/caregiver oriented to unit, equipment, visitation policy and means for communicating concern  2.  Complete/review Learning Assessment  3.  Assess knowledge level of disease process/condition, treatment plan, diagnostic tests and medications  4.  Explain disease process/condition, treatment plan, diagnostic tests and medications  Outcome: Progressing  Note: Discuss POC with patient  Address questions and  concerns, escalate as appropriate  Check for pt understanding of POC       Problem: Fall Risk  Goal: Patient will remain free from falls  Description: Target End Date:  Prior to discharge or change in level of care    Document interventions on the Delaney Mcgill Fall Risk Assessment    1.  Assess for fall risk factors  2.  Implement fall precautions  Outcome: Progressing  Note: Fall risk assessment   Fall precautions in place per unit standard and protocol; see flowsheet         Patient is not progressing towards the following goals:

## 2024-09-18 NOTE — DIETARY
NUTRITION SERVICES: BMI - Pt with BMI >40 (=Body mass index is 46.9 kg/m².), Class III obesity. Weight loss counseling not appropriate in acute care setting.     RECOMMEND - If appropriate at DC please refer to outpatient nutrition services for weight management.

## 2024-09-18 NOTE — CONSULTS
Consult Note: Hematology/Oncology    Date of consultation: 9/18/2024 2:10 PM    Referring provider: Dr Strange    Reason for consultation: Recurrent right lower extremity DVT, concern for Xarelto failure      History of presenting illness:   69-year-old woman with a PMH of morbid obesity, wheelchair-bound due to osteoarthritis, history of anxiety, diabetes mellitus type 2, hypothyroidism with history of chronic lower extremity DVT.  She presented on 9/17/2024 with ongoing and worsening swelling over the right lower extremity on the right side, she has been on Xarelto for a few years now.  Around 4 months ago she sustained a ground-level fall.  Since then the right lower extremity has been getting swollen with worsening pain, 2 days ago she hit the right lower extremity by accident with worsening pain and swelling.  She does take Xarelto for a few years, she is compliant with it unless the medication is not ready to be delivered at her pharmacy, last time she missed Xarelto was around 2 weeks ago, missing 7-10 days  because the pharmacy did not have it ready for pickup.  This has been happening for the past 3 months every time that she needs a refill.    In the ER underwent for a right lower extremity Doppler ultrasound which showed an acute to subacute partially occlusive thrombus of the distal common femoral vein, also partially occlusive thrombus of the proximal, middle and distal femoral vein, chest x-ray of the tibia was negative for a fracture and CTA was negative for a PE, BNP of 700.  Started on Lovenox 120 mg twice daily now on heparin drip,  we were consulted for further recommendations for possible Xarelto failure    Past Medical History:    Past Medical History:   Diagnosis Date    Anxiety     Arthritis     osteo    Dental disorder 2016    upper denture    DJD (degenerative joint disease)     DVT (deep venous thrombosis) ([facility]) 2-2016    leg right    Gynecological disorder     Heart burn 2016    pain legs  and hands; arthritis    Indigestion     Obesity     Restless leg syndrome        Past surgical history:    Past Surgical History:   Procedure Laterality Date    ANTERIOR AND POSTERIOR REPAIR  7/18/2017    Procedure: ANTERIOR AND POSTERIOR REPAIR ;  Surgeon: Dave Ellis M.D.;  Location: SURGERY SAME DAY [facility]S;  Service:     ENTEROCELE REPAIR  7/18/2017    Procedure: ENTEROCELE REPAIR , PERINEOPLASTY ;  Surgeon: Dave Ellis M.D.;  Location: SURGERY SAME DAY [facility];  Service:     VAGINAL SUSPENSION  7/18/2017    Procedure: VAGINAL SUSPENSION- SACROSPINOUS VAULT ;  Surgeon: Dave Ellis M.D.;  Location: SURGERY SAME DAY [facility];  Service:     BLADDER SLING FEMALE  7/18/2017    Procedure: BLADDER SLING FEMALE TOT;  Surgeon: Dave Ellis M.D.;  Location: SURGERY SAME DAY [facility];  Service:     VAGINAL HYSTERECTOMY SCOPE TOTAL  1/3/2017    Procedure: VAGINAL HYSTERECTOMY SCOPE TOTAL WITH BSO;  Surgeon: Dave Ellis M.D.;  Location: SURGERY SAME DAY [facility];  Service:     ANTERIOR AND POSTERIOR REPAIR  1/3/2017    Procedure: ANTERIOR AND POSTERIOR REPAIR;  Surgeon: Dave Ellis M.D.;  Location: SURGERY SAME DAY [facility];  Service:     ENTEROCELE REPAIR  1/3/2017    Procedure: ENTEROCELE REPAIR;  Surgeon: Dave Ellis M.D.;  Location: SURGERY SAME DAY [facility];  Service:     VAGINAL SUSPENSION  1/3/2017    Procedure: VAGINAL SUSPENSION - SACROSPINOUS VAULT W/PERINEOPLASTY;  Surgeon: Dave Ellis M.D.;  Location: SURGERY SAME DAY [facility];  Service:     BLADDER SLING FEMALE  1/3/2017    Procedure: BLADDER SLING FEMALE - TOT;  Surgeon: Dave Ellis M.D.;  Location: SURGERY SAME DAY [facility];  Service:     CYSTOSCOPY  1/3/2017    Procedure: CYSTOSCOPY;  Surgeon: Dave Ellis M.D.;  Location: SURGERY SAME DAY [facility];  Service:     OTHER ABDOMINAL SURGERY      gallbladder surgery at age 20 yr       Allergies:  Asa [aspirin] and Pcn  [penicillins]    Medications:    Current Facility-Administered Medications   Medication Dose Route Frequency Provider Last Rate Last Admin    potassium chloride SA (Kdur) tablet 40 mEq  40 mEq Oral Once Christy Guadalupe M.D.        heparin injection 3,200 Units  3,200 Units Intravenous PRN Leonel Juarez M.D.        And    heparin infusion 25,000 Units in 500 mL 0.45% NACL   Intravenous Continuous Leonel Juarez M.D. 24 mL/hr at 09/18/24 1212 1,200 Units/hr at 09/18/24 1212    senna-docusate (Pericolace Or Senokot S) 8.6-50 MG per tablet 2 Tablet  2 Tablet Oral Q EVENING Christy Guadalupe M.D.   2 Tablet at 09/17/24 1850    And    polyethylene glycol/lytes (Miralax) Packet 1 Packet  1 Packet Oral QDAY PRN Christy Guadalupe M.D.        acetaminophen (Tylenol) tablet 650 mg  650 mg Oral Q6HRS PRN Christy Guadalupe M.D.   650 mg at 09/17/24 2113    ondansetron (Zofran) syringe/vial injection 4 mg  4 mg Intravenous Q4HRS PRN Christy Guadalupe M.D.        ondansetron (Zofran ODT) dispertab 4 mg  4 mg Oral Q4HRS PRN Christy Guadalupe M.D.        guaiFENesin dextromethorphan (Robitussin DM) 100-10 MG/5ML syrup 10 mL  10 mL Oral Q6HRS PRN Christy Guadalupe M.D.        insulin regular (HumuLIN R,NovoLIN R) injection  1-6 Units Subcutaneous Q6HRS Christy Guadalupe M.D.        And    dextrose 50% (D50W) injection 25 g  25 g Intravenous Q15 MIN PRN Christy Guadalupe M.D.        albuterol inhaler 2 Puff  2 Puff Inhalation Q6HRS PRN Christy Guadalupe M.D.        atorvastatin (Lipitor) tablet 10 mg  10 mg Oral DAILY Christy Guadalupe M.D.   10 mg at 09/18/24 0513    gabapentin (Neurontin) capsule 400 mg  400 mg Oral TID Christy Guadalupe M.D.   400 mg at 09/18/24 1215    levothyroxine (Synthroid) tablet 125 mcg  125 mcg Oral AM ES Christy Guadalupe M.D.   125 mcg at 09/18/24 0513    [Held by provider] metoprolol SR (Toprol XL) tablet 25 mg  25 mg Oral DAILY Christy Guadalupe M.D.        omeprazole (PriLOSEC) capsule 20 mg  20 mg Oral DAILY Christy Guadalupe M.D.   20 mg at 09/18/24 0513    traZODone (Desyrel) tablet  100 mg  100 mg Oral Nightly Christy Guadalupe M.D.   100 mg at 24    ALPRAZolam (Xanax) tablet 0.25 mg  0.25 mg Oral HS PRN JANNA GraceDKyara   0.25 mg at 24    potassium chloride SA (Kdur) tablet 40 mEq  40 mEq Oral DAILY Christy Guadalupe M.D.   40 mEq at 24 0513       Social History:     Social History     Socioeconomic History    Marital status: Single     Spouse name: Not on file    Number of children: Not on file    Years of education: Not on file    Highest education level: Not on file   Occupational History    Not on file   Tobacco Use    Smoking status: Former     Current packs/day: 0.00     Types: Cigarettes     Quit date: 1973     Years since quittin.7    Smokeless tobacco: Not on file   Vaping Use    Vaping status: Never Used   Substance and Sexual Activity    Alcohol use: No     Alcohol/week: 0.0 oz    Drug use: Yes     Types: Inhaled     Comment: marijuana occasionally    Sexual activity: Not on file   Other Topics Concern    Not on file   Social History Narrative    Not on file     Social Determinants of Health     Financial Resource Strain: Not on file   Food Insecurity: Not on file   Transportation Needs: Not on file   Physical Activity: Not on file   Stress: Not on file   Social Connections: Not on file   Intimate Partner Violence: Not on file   Housing Stability: Not on file       Family History:   History reviewed. No pertinent family history.    Review of Systems:   All other review of systems are negative except what was mentioned above in the HPI.    Constitutional: No fever, chills, weight loss ,malaise/fatigue.    HEENT: No new auditory or visual complaints. No sore throat and neck pain.     Respiratory:No new cough, sputum production, shortness of breath and wheezing.    Cardiovascular: No new chest pain, palpitations, orthopnea and leg swelling.    Gastrointestinal: No heartburn, nausea, vomiting ,abdominal pain, hematochezia or melena     Genitourinary:  "Negative for dysuria, hematuria    Musculoskeletal: Bilateral lower extremity swelling, pain in the right lower extremity  Skin: Negative for rash and itching.    Neurological: Negative for focal weakness or headaches.    Endo/Heme/Allergies: No abnormal bleed/bruise.    Psychiatric/Behavioral: No new depression/anxiety.    Physical Exam:   Vitals:   /63   Pulse 67   Temp 36.1 °C (97 °F) (Temporal)   Resp 18   Ht 1.6 m (5' 3\")   Wt 120 kg (264 lb 12.4 oz)   SpO2 95%   BMI 46.90 kg/m²     General: Not in acute distress, alert and oriented x 3, morbid obesity  HEENT: No pallor, icterus. Oropharynx clear.   Neck: Supple, no palpable masses.  Lymph nodes: No palpable cervical, supraclavicular, axillary or inguinal lymphadenopathy.    CVS: regular rate and rhythm, no rubs or gallops  RESP: Clear to auscultate bilaterally, no wheezing or crackles.   ABD: Soft, non tender, non distended, positive bowel sounds, no palpable organomegaly  EXT:  bilateral lower extremity swelling, right more than left  CNS: Alert and oriented x3, No focal deficits.  Skin- No rash      Labs:   Recent Labs     09/17/24  1105 09/18/24  0206 09/18/24  0908   RBC 4.26 3.96*  --    HEMOGLOBIN 11.5* 10.9*  --    HEMATOCRIT 37.4 34.4*  --    PLATELETCT 276 242  --    PROTHROMBTM  --  17.9*  --    APTT  --  34.5 >240.0*   INR  --  1.48*  --      Lab Results   Component Value Date/Time    SODIUM 141 09/18/2024 02:06 AM    POTASSIUM 3.4 (L) 09/18/2024 02:06 AM    CHLORIDE 105 09/18/2024 02:06 AM    CO2 26 09/18/2024 02:06 AM    GLUCOSE 82 09/18/2024 02:06 AM    BUN 5 (L) 09/18/2024 02:06 AM    CREATININE 0.48 (L) 09/18/2024 02:06 AM        Assessment and Plan:  1.  Recurrent acute-subacute right lower extremity DVT  -Was on Xarelto    2.  Morbid obesity    3.  Unable to ambulate due to osteoarthritis    Plan  -After a good discussion with the patient she did mention around 2 weeks ago she missed Xarelto for 1 week to 10 days given " prescription was not ready at her pharmacy,  has been happening every month when  new refills needed,  missing around 1 week each time.  Otherwise when she does have the medication available she has been compliant with Xarelto.  Current LE  DVT not deemed to be related to Xarelto failure.  I discussed options, ,#1. to avoid refill issues she can  prescribed  Xarelto with 90  supply at 20 mg daily with a few refills,  the other way will be following with the Coumadin clinic but given she uses a  scooter to ambulate this will be very difficult for her.  At the present time I will recommend her to continue on Xarelto.  Recurrent DVT related with noncompliance,  recent fall and trauma also morbid obesity, low mobililty and possible lymphedema contributing to recurrent DVT.  No thrombophilia workup indicated at the present time.   -Continue following with PCP in the outpatient setting    I will sign off, please call back if questions or concerns      She agreed and verbalized her agreement and understanding with the current plan.  I answered all questions and concerns she has at this time.              Thank you for allowing me to participate in her care.    Please note that this dictation was created using voice recognition software. I have made every reasonable attempt to correct obvious errors, but I expect that there are errors of grammar and possibly content that I did not discover before finalizing the note.      SIGNATURES:  Stevie Dc III, M.D.    CC:  Maribel Salazar M.D.

## 2024-09-19 PROBLEM — I95.9 HYPOTENSION: Status: ACTIVE | Noted: 2024-09-19

## 2024-09-19 LAB
ALBUMIN SERPL BCP-MCNC: 2.6 G/DL (ref 3.2–4.9)
ALBUMIN/GLOB SERPL: 1.1 G/DL
ALP SERPL-CCNC: 89 U/L (ref 30–99)
ALT SERPL-CCNC: 6 U/L (ref 2–50)
ANION GAP SERPL CALC-SCNC: 8 MMOL/L (ref 7–16)
APTT PPP: 104.6 SEC (ref 24.7–36)
APTT PPP: 125.2 SEC (ref 24.7–36)
APTT PPP: 47.1 SEC (ref 24.7–36)
APTT PPP: 71.4 SEC (ref 24.7–36)
AST SERPL-CCNC: 21 U/L (ref 12–45)
BILIRUB SERPL-MCNC: 0.3 MG/DL (ref 0.1–1.5)
BUN SERPL-MCNC: 5 MG/DL (ref 8–22)
CALCIUM ALBUM COR SERPL-MCNC: 9.1 MG/DL (ref 8.5–10.5)
CALCIUM SERPL-MCNC: 8 MG/DL (ref 8.5–10.5)
CHLORIDE SERPL-SCNC: 109 MMOL/L (ref 96–112)
CO2 SERPL-SCNC: 24 MMOL/L (ref 20–33)
CORTIS SERPL-MCNC: 4.5 UG/DL (ref 0–23)
CREAT SERPL-MCNC: 0.38 MG/DL (ref 0.5–1.4)
ERYTHROCYTE [DISTWIDTH] IN BLOOD BY AUTOMATED COUNT: 62.3 FL (ref 35.9–50)
ERYTHROCYTE [DISTWIDTH] IN BLOOD BY AUTOMATED COUNT: 64.3 FL (ref 35.9–50)
GFR SERPLBLD CREATININE-BSD FMLA CKD-EPI: 107 ML/MIN/1.73 M 2
GLOBULIN SER CALC-MCNC: 2.4 G/DL (ref 1.9–3.5)
GLUCOSE BLD STRIP.AUTO-MCNC: 100 MG/DL (ref 65–99)
GLUCOSE BLD STRIP.AUTO-MCNC: 106 MG/DL (ref 65–99)
GLUCOSE BLD STRIP.AUTO-MCNC: 111 MG/DL (ref 65–99)
GLUCOSE BLD STRIP.AUTO-MCNC: 121 MG/DL (ref 65–99)
GLUCOSE BLD STRIP.AUTO-MCNC: 95 MG/DL (ref 65–99)
GLUCOSE SERPL-MCNC: 95 MG/DL (ref 65–99)
HCT VFR BLD AUTO: 32 % (ref 37–47)
HCT VFR BLD AUTO: 33.9 % (ref 37–47)
HGB BLD-MCNC: 10.2 G/DL (ref 12–16)
HGB BLD-MCNC: 10.6 G/DL (ref 12–16)
INR PPP: 1.24 (ref 0.87–1.13)
LACTATE SERPL-SCNC: 1.9 MMOL/L (ref 0.5–2)
MAGNESIUM SERPL-MCNC: 2 MG/DL (ref 1.5–2.5)
MCH RBC QN AUTO: 27 PG (ref 27–33)
MCH RBC QN AUTO: 27.9 PG (ref 27–33)
MCHC RBC AUTO-ENTMCNC: 31.3 G/DL (ref 32.2–35.5)
MCHC RBC AUTO-ENTMCNC: 31.9 G/DL (ref 32.2–35.5)
MCV RBC AUTO: 86.3 FL (ref 81.4–97.8)
MCV RBC AUTO: 87.7 FL (ref 81.4–97.8)
PHOSPHATE SERPL-MCNC: 3.5 MG/DL (ref 2.5–4.5)
PLATELET # BLD AUTO: 208 K/UL (ref 164–446)
PLATELET # BLD AUTO: 222 K/UL (ref 164–446)
PMV BLD AUTO: 10.1 FL (ref 9–12.9)
PMV BLD AUTO: 9.9 FL (ref 9–12.9)
POTASSIUM SERPL-SCNC: 4.2 MMOL/L (ref 3.6–5.5)
PROT SERPL-MCNC: 5 G/DL (ref 6–8.2)
PROTHROMBIN TIME: 15.6 SEC (ref 12–14.6)
RBC # BLD AUTO: 3.65 M/UL (ref 4.2–5.4)
RBC # BLD AUTO: 3.93 M/UL (ref 4.2–5.4)
SODIUM SERPL-SCNC: 141 MMOL/L (ref 135–145)
WBC # BLD AUTO: 4.9 K/UL (ref 4.8–10.8)
WBC # BLD AUTO: 6.7 K/UL (ref 4.8–10.8)

## 2024-09-19 PROCEDURE — 700111 HCHG RX REV CODE 636 W/ 250 OVERRIDE (IP)

## 2024-09-19 PROCEDURE — 83605 ASSAY OF LACTIC ACID: CPT

## 2024-09-19 PROCEDURE — 700102 HCHG RX REV CODE 250 W/ 637 OVERRIDE(OP): Performed by: INTERNAL MEDICINE

## 2024-09-19 PROCEDURE — 82533 TOTAL CORTISOL: CPT

## 2024-09-19 PROCEDURE — A9270 NON-COVERED ITEM OR SERVICE: HCPCS | Performed by: INTERNAL MEDICINE

## 2024-09-19 PROCEDURE — 85610 PROTHROMBIN TIME: CPT

## 2024-09-19 PROCEDURE — 82962 GLUCOSE BLOOD TEST: CPT | Mod: 91

## 2024-09-19 PROCEDURE — 700102 HCHG RX REV CODE 250 W/ 637 OVERRIDE(OP)

## 2024-09-19 PROCEDURE — 700105 HCHG RX REV CODE 258

## 2024-09-19 PROCEDURE — A9270 NON-COVERED ITEM OR SERVICE: HCPCS

## 2024-09-19 PROCEDURE — 84100 ASSAY OF PHOSPHORUS: CPT

## 2024-09-19 PROCEDURE — 80053 COMPREHEN METABOLIC PANEL: CPT

## 2024-09-19 PROCEDURE — 85027 COMPLETE CBC AUTOMATED: CPT

## 2024-09-19 PROCEDURE — 99233 SBSQ HOSP IP/OBS HIGH 50: CPT | Mod: GC | Performed by: INTERNAL MEDICINE

## 2024-09-19 PROCEDURE — 700101 HCHG RX REV CODE 250

## 2024-09-19 PROCEDURE — 770020 HCHG ROOM/CARE - TELE (206)

## 2024-09-19 PROCEDURE — 85730 THROMBOPLASTIN TIME PARTIAL: CPT

## 2024-09-19 PROCEDURE — 36415 COLL VENOUS BLD VENIPUNCTURE: CPT

## 2024-09-19 PROCEDURE — 83735 ASSAY OF MAGNESIUM: CPT

## 2024-09-19 RX ORDER — SODIUM CHLORIDE, SODIUM LACTATE, POTASSIUM CHLORIDE, CALCIUM CHLORIDE 600; 310; 30; 20 MG/100ML; MG/100ML; MG/100ML; MG/100ML
INJECTION, SOLUTION INTRAVENOUS CONTINUOUS
Status: DISCONTINUED | OUTPATIENT
Start: 2024-09-19 | End: 2024-09-19

## 2024-09-19 RX ORDER — SODIUM CHLORIDE, SODIUM LACTATE, POTASSIUM CHLORIDE, AND CALCIUM CHLORIDE .6; .31; .03; .02 G/100ML; G/100ML; G/100ML; G/100ML
500 INJECTION, SOLUTION INTRAVENOUS ONCE
Status: COMPLETED | OUTPATIENT
Start: 2024-09-19 | End: 2024-09-19

## 2024-09-19 RX ORDER — OXYCODONE HYDROCHLORIDE 5 MG/1
5 TABLET ORAL EVERY 4 HOURS PRN
Status: COMPLETED | OUTPATIENT
Start: 2024-09-19 | End: 2024-09-20

## 2024-09-19 RX ORDER — TRAZODONE HYDROCHLORIDE 50 MG/1
25 TABLET, FILM COATED ORAL NIGHTLY
Status: DISCONTINUED | OUTPATIENT
Start: 2024-09-19 | End: 2024-09-24 | Stop reason: HOSPADM

## 2024-09-19 RX ORDER — ACETAMINOPHEN 500 MG
1000 TABLET ORAL EVERY 6 HOURS PRN
Status: DISCONTINUED | OUTPATIENT
Start: 2024-09-19 | End: 2024-09-24 | Stop reason: HOSPADM

## 2024-09-19 RX ORDER — GABAPENTIN 100 MG/1
100 CAPSULE ORAL EVERY 8 HOURS
Status: DISCONTINUED | OUTPATIENT
Start: 2024-09-19 | End: 2024-09-24 | Stop reason: HOSPADM

## 2024-09-19 RX ORDER — LIDOCAINE 4 G/G
1 PATCH TOPICAL EVERY EVENING
Status: DISCONTINUED | OUTPATIENT
Start: 2024-09-19 | End: 2024-09-24 | Stop reason: HOSPADM

## 2024-09-19 RX ORDER — SODIUM CHLORIDE, SODIUM LACTATE, POTASSIUM CHLORIDE, AND CALCIUM CHLORIDE .6; .31; .03; .02 G/100ML; G/100ML; G/100ML; G/100ML
1000 INJECTION, SOLUTION INTRAVENOUS ONCE
Status: COMPLETED | OUTPATIENT
Start: 2024-09-19 | End: 2024-09-19

## 2024-09-19 RX ORDER — MIDODRINE HYDROCHLORIDE 5 MG/1
5 TABLET ORAL 3 TIMES DAILY PRN
Status: DISCONTINUED | OUTPATIENT
Start: 2024-09-19 | End: 2024-09-23

## 2024-09-19 RX ADMIN — SODIUM CHLORIDE, POTASSIUM CHLORIDE, SODIUM LACTATE AND CALCIUM CHLORIDE 500 ML: 600; 310; 30; 20 INJECTION, SOLUTION INTRAVENOUS at 09:36

## 2024-09-19 RX ADMIN — LIDOCAINE 1 PATCH: 4 PATCH TOPICAL at 15:37

## 2024-09-19 RX ADMIN — OXYCODONE HYDROCHLORIDE 5 MG: 5 TABLET ORAL at 21:21

## 2024-09-19 RX ADMIN — OXYCODONE HYDROCHLORIDE 5 MG: 5 TABLET ORAL at 05:18

## 2024-09-19 RX ADMIN — HYDROCORTISONE SODIUM SUCCINATE 100 MG: 100 INJECTION, POWDER, FOR SOLUTION INTRAMUSCULAR; INTRAVENOUS at 17:07

## 2024-09-19 RX ADMIN — ONDANSETRON 4 MG: 4 TABLET, ORALLY DISINTEGRATING ORAL at 04:46

## 2024-09-19 RX ADMIN — LEVOTHYROXINE SODIUM 125 MCG: 0.12 TABLET ORAL at 04:39

## 2024-09-19 RX ADMIN — SODIUM CHLORIDE, POTASSIUM CHLORIDE, SODIUM LACTATE AND CALCIUM CHLORIDE 1000 ML: 600; 310; 30; 20 INJECTION, SOLUTION INTRAVENOUS at 11:35

## 2024-09-19 RX ADMIN — HEPARIN SODIUM 3200 UNITS: 1000 INJECTION, SOLUTION INTRAVENOUS; SUBCUTANEOUS at 17:02

## 2024-09-19 RX ADMIN — SODIUM CHLORIDE, POTASSIUM CHLORIDE, SODIUM LACTATE AND CALCIUM CHLORIDE 500 ML: 600; 310; 30; 20 INJECTION, SOLUTION INTRAVENOUS at 09:08

## 2024-09-19 RX ADMIN — GABAPENTIN 400 MG: 400 CAPSULE ORAL at 04:38

## 2024-09-19 RX ADMIN — ATORVASTATIN CALCIUM 10 MG: 10 TABLET, FILM COATED ORAL at 04:39

## 2024-09-19 RX ADMIN — OMEPRAZOLE 20 MG: 20 CAPSULE, DELAYED RELEASE ORAL at 04:38

## 2024-09-19 RX ADMIN — TRAZODONE HYDROCHLORIDE 25 MG: 50 TABLET ORAL at 20:53

## 2024-09-19 RX ADMIN — GABAPENTIN 100 MG: 100 CAPSULE ORAL at 21:22

## 2024-09-19 RX ADMIN — HEPARIN SODIUM 950 UNITS/HR: 5000 INJECTION, SOLUTION INTRAVENOUS at 02:22

## 2024-09-19 RX ADMIN — GABAPENTIN 400 MG: 400 CAPSULE ORAL at 11:35

## 2024-09-19 ASSESSMENT — ENCOUNTER SYMPTOMS
CARDIOVASCULAR NEGATIVE: 1
CONSTITUTIONAL NEGATIVE: 1
PSYCHIATRIC NEGATIVE: 1
GASTROINTESTINAL NEGATIVE: 1
RESPIRATORY NEGATIVE: 1
WEAKNESS: 1
FALLS: 1
EYES NEGATIVE: 1

## 2024-09-19 ASSESSMENT — PAIN DESCRIPTION - PAIN TYPE
TYPE: ACUTE PAIN

## 2024-09-19 ASSESSMENT — FIBROSIS 4 INDEX: FIB4 SCORE: 2.7

## 2024-09-19 NOTE — ASSESSMENT & PLAN NOTE
- Resolved.   - tapering on IV Hyrdrocotisone 50mg TID. 09/21- Transitioned to Oral 10mg in the morning and 5 mg at night.   - 09/23 - tapered down to oral 5 mg twice daily. /62.  - Morning cortisol 124 on 09/20.  - Orthostatics: Negative  - On Midodrine 5mg PRN   - Hb stable  - Will monitor

## 2024-09-19 NOTE — CARE PLAN
The patient is Stable - Low risk of patient condition declining or worsening    Shift Goals  Clinical Goals: heparin drip, monitor hemodynamic status  Patient Goals: rest, comfort  Family Goals: isabelle    Progress made toward(s) clinical / shift goals:    Problem: Pain - Standard  Goal: Alleviation of pain or a reduction in pain to the patient’s comfort goal  Description: Target End Date:  Prior to discharge or change in level of care    Document on Vitals flowsheet    1.  Document pain using the appropriate pain scale per order or unit policy  2.  Educate and implement non-pharmacologic comfort measures (i.e. relaxation, distraction, massage, cold/heat therapy, etc.)  3.  Pain management medications as ordered  4.  Reassess pain after pain med administration per policy  5.  If opiods administered assess patient's response to pain medication is appropriate per POSS sedation scale  6.  Follow pain management plan developed in collaboration with patient and interdisciplinary team (including palliative care or pain specialists if applicable)  Outcome: Progressing  Note: Pain controlled with repositioning, heat packs and lidocaine patch.      Problem: Knowledge Deficit - Standard  Goal: Patient and family/care givers will demonstrate understanding of plan of care, disease process/condition, diagnostic tests and medications  Description: Target End Date:  1-3 days or as soon as patient condition allows    Document in Patient Education    1.  Patient and family/caregiver oriented to unit, equipment, visitation policy and means for communicating concern  2.  Complete/review Learning Assessment  3.  Assess knowledge level of disease process/condition, treatment plan, diagnostic tests and medications  4.  Explain disease process/condition, treatment plan, diagnostic tests and medications  Outcome: Progressing  Note: Pt actively participates in POC. Pt and board updated, all questions and concerns answered. Pt encouraged to  voice all questions and concerns.

## 2024-09-19 NOTE — DISCHARGE PLANNING
Care Transition Team Assessment    Chart review completed and met with pt at bedside. Pt verified the information on the face sheet. Pt lives in a third floor apartment that has elevator access. Pt has a paid care giver who assists her 2 hours/day and stays at her home at night. Pt stated she is independent at home, however has difficulty with most ADLs and IADLs. Pt primarily uses an electric wheelchair, however also has a cane, FWW, bedside commode, and a manual wheelchair that she uses in her shower. Pt has groceries delivered and has utilized [employer] access in the past for transportation needs. Pt also has MTM transportation benefits if needed. Pt receives about $1,000/month in SSI. No SA or MH concerns. Pt does not have an advance directive, however her NOK is her son, Dean Briscoe ([phone]) who lives in [place]. Pt denies having any other local supports, aside from her paid care giver. Pt confirmed that her care giver will be able to provide transportation home upon DC.    LSW spoke with pt about HHC. Pt confirmed she's had HHC in the past and believes she was on service with [facility]. HHC choice form faxed to [facility] and HHC order requested from attending team.    Information Source  Orientation Level: Oriented X4  Information Given By: Patient, Other (Comments) (chart review)  Informant's Name: Nieves Murphy  Who is responsible for making decisions for patient? : Patient    Readmission Evaluation  Is this a readmission?: No    Elopement Risk  Legal Hold: No  Ambulatory or Self Mobile in Wheelchair: Yes  Disoriented: No  Psychiatric Symptoms: None  History of Wandering: No  Elopement this Admit: No  Vocalizing Wanting to Leave: No  Displays Behaviors, Body Language Wanting to Leave: No-Not at Risk for Elopement  Elopement Risk: Not at Risk for Elopement    Interdisciplinary Discharge Planning  Lives with - Patient's Self Care Capacity: Unrelated Adult  Housing / Facility: 1 Story Apartment / Condo  Prior  Services: Continuous (24 Hour) Care Giving Per Service    Discharge Preparedness  What is your plan after discharge?: Home health care  What are your discharge supports?: Other (comment) (caregiver)  Prior Functional Level: Ambulatory, Needs Assist with Activities of Daily Living, Independent with Medication Management, Uses Wheelchair  Difficulity with ADLs: Bathing, Dressing, Toileting, Walking  Difficulity with IADLs: Cooking, Driving, Laundry, Shopping    Functional Assesment  Prior Functional Level: Ambulatory, Needs Assist with Activities of Daily Living, Independent with Medication Management, Uses Wheelchair    Finances  Financial Barriers to Discharge: No  Prescription Coverage: Yes    Vision / Hearing Impairment  Right Eye Vision: Wears Glasses  Left Eye Vision: Wears Glasses    Advance Directive  Advance Directive?: None    Domestic Abuse  Have you ever been the victim of abuse or violence?: No    Psychological Assessment  History of Substance Abuse: None  History of Psychiatric Problems: No  Non-compliant with Treatment: No  Newly Diagnosed Illness: No    Discharge Risks or Barriers  Discharge risks or barriers?: No    Anticipated Discharge Information  Discharge Disposition: D/T to home under A care in anticipation of covered skilled care (06)  Discharge Address: [address] [place] [address]  [place] [id number]

## 2024-09-19 NOTE — PROGRESS NOTES
Telemetry Report:      Rhythm:     SR  Heart Rate 65-77  Ectopy:      PVC rare , coup rare       LA:  0.20        QRS:       0.09  QT:   0.40        Per [employer] from Monitor Room

## 2024-09-19 NOTE — PROGRESS NOTES
[facility] Internal Medicine Daily Progress Note    Date of Service  9/19/2024    [facility] Team: R IM Green Team   Attending: Jeremy Strange M.d.  Senior Resident: Dr. Larry Castaneda  Intern:  Dr. Christy Guadalupe  Contact Number: [phone]    Chief Complaint    Nieves Murphy is a 69 y.o. female admitted 9/17/2024 with Leg Swelling     Hospital Course    Nieves Murphy is a pleasant 69 y.o. female with a past medical history of diabetes, arthritis, anxiety, hypothyroidism and chronic lower extremity DVT, who presented to the ED on 9/17/2024 with right leg swelling and pain.     According to the patient she was in her usual state of health and doing well until 4 months ago when she had a fall.  At that time she felt like her right leg bone was broken but she did not come to the hospital or get checked.  She feels like the swelling has been present since then and it has been constant.  She was okay with the swelling until a few days ago when she ran into a table and hurt her right leg.  Since then, the swelling has been worsening, and it is associated with pain.  She denies any other associated symptoms including chest pain or shortness of breath.  She has had a previous history of left lower extremity DVT, (which she is unable to recall how many years ago) but has been on Xarelto since then.  She claims that she is completely compliant with all her medications and do not miss any of her pills.  She also adds that she has been bedridden since 2010 due to worsening arthritis and uses a wheelchair to ambulate.     Her caregiver adds that for the past few weeks, when she sits in her chair her foot starts swelling. He also noticed some tender points on his on her right lower leg.    ED Management:  - Vital signs were checked which were stable.  - Patient started on Lovenox 120 mg.  - X-ray tibia/fibula done which was unremarkable.  - Her CBC was found to be normal, Chem panel showed low potassium levels.  - Her pro BNP was  found to be 719.  - CT for PE ordered that showed cardiomegaly and coronary calcification.  - Ultrasound bilateral lower extremity showed acute to subacute, partially occlusive thrombus of the right distal common femoral vein. Acute to subacute, partially occlusive thrombus in the right proximal,middle and distal femoral vein.     She was admitted on the floor for further assessment and management of right lower extremity DVT    09/18- Heparin infusion was started at 2 AM 09/18.Her TSH was high, 72.5 and her free T4 was found to be 0.66.  When inquired about her taking thyroid medications she said that she is not aware of any thyroid medications that she takes. Home dose of Levothyroxine resumed 09/18.  Hematology consulted for possible Xeralto failure, she disclosed to  them that she has been non compliant with her medications and that might have caused the blood clots. Further work up deferred.     Interval Problem Update    Overnight events:   - She had bilateral leg pain- 5mg Oxycodone given   - K+ was 4.6   - Patient had soft blood pressures. Oral hydration encouraged. BP improved     She states that she feels okay. She does not have any active complaints.  - She denies any chest pain, shortness of breath or leg pain.  - Her HbA1c was found to be 5.3%.  - Her PT/INR is high probably secondary to Xarelto use.  - Her vital signs remained stable.    - She has had around 3 episodes of diarrhea since yesterday but she is not on antibiotics, will defer testing for C.Diff since she was not on antibiotics. 1l bolus given . Will stop PRN Stool softeners.   - Will monitor for hypotension    - Rapid Response team rounding on her for low BP. 2nd bolus given  - Will consider shifting back to Xarelto today, in the evening once her BP stabilizes.   - Will start PT/OT once stable   - Home health ordered     I have discussed this patient's plan of care and discharge plan at IDT rounds today with Case Management, Nursing,  Nursing leadership, and other members of the IDT team.    Consultants/Specialty  Hematology    Code Status  Full Code    Disposition  The patient is not medically cleared for discharge to home or a post-acute facility.      Review of Systems  Review of Systems   Constitutional: Negative.    HENT: Negative.     Eyes: Negative.    Respiratory: Negative.     Cardiovascular: Negative.    Gastrointestinal: Negative.    Genitourinary: Negative.    Musculoskeletal:  Positive for falls.   Skin: Negative.    Neurological:  Positive for weakness.   Endo/Heme/Allergies: Negative.    Psychiatric/Behavioral: Negative.          Physical Exam  Temp:  [35.9 °C (96.7 °F)-36.8 °C (98.3 °F)] 36.8 °C (98.3 °F)  Pulse:  [61-73] 67  Resp:  [18] 18  BP: ()/(30-63) 87/39  SpO2:  [90 %-95 %] 91 %    Physical Exam  Constitutional:       Appearance: Normal appearance. She is obese.   HENT:      Head: Normocephalic and atraumatic.      Right Ear: Tympanic membrane, ear canal and external ear normal.      Left Ear: Tympanic membrane, ear canal and external ear normal.      Nose: Nose normal.      Mouth/Throat:      Mouth: Mucous membranes are moist.   Eyes:      Extraocular Movements: Extraocular movements intact.      Pupils: Pupils are equal, round, and reactive to light.   Cardiovascular:      Rate and Rhythm: Normal rate and regular rhythm.      Pulses: Normal pulses.      Heart sounds: Normal heart sounds.   Pulmonary:      Effort: Pulmonary effort is normal.      Breath sounds: Normal breath sounds.   Abdominal:      General: Bowel sounds are normal.      Palpations: Abdomen is soft.   Musculoskeletal:         General: Swelling and tenderness present.      Cervical back: Normal range of motion and neck supple.      Right lower leg: Edema present.      Left lower leg: Edema present.   Skin:     General: Skin is warm.      Capillary Refill: Capillary refill takes less than 2 seconds.   Neurological:      General: No focal deficit  present.      Mental Status: She is alert. Mental status is at baseline.   Psychiatric:         Mood and Affect: Mood normal.         Behavior: Behavior normal.         Thought Content: Thought content normal.         Judgment: Judgment normal.         Fluids    Intake/Output Summary (Last 24 hours) at 9/19/2024 1115  Last data filed at 9/19/2024 0800  Gross per 24 hour   Intake 300 ml   Output 2850 ml   Net -2550 ml       Laboratory  Recent Labs     09/17/24  1105 09/18/24  0206 09/19/24  0029   WBC 6.2 5.2 6.7   RBC 4.26 3.96* 3.65*   HEMOGLOBIN 11.5* 10.9* 10.2*   HEMATOCRIT 37.4 34.4* 32.0*   MCV 87.8 86.9 87.7   MCH 27.0 27.5 27.9   MCHC 30.7* 31.7* 31.9*   RDW 62.9* 63.0* 64.3*   PLATELETCT 276 242 222   MPV 10.0 10.0 9.9     Recent Labs     09/18/24  0206 09/18/24  1554 09/18/24  2030 09/19/24  0029   SODIUM 141 138  --  141   POTASSIUM 3.4* 4.6 4.5 4.2   CHLORIDE 105 105  --  109   CO2 26 25  --  24   GLUCOSE 82 96  --  95   BUN 5* 5*  --  5*   CREATININE 0.48* 0.55  --  0.38*   CALCIUM 8.1* 8.1*  --  8.0*     Recent Labs     09/18/24  0206 09/18/24  0908 09/18/24  1554 09/19/24  0029 09/19/24  0914   APTT 34.5   < > 175.0* 104.6* 71.4*   INR 1.48*  --   --  1.24*  --     < > = values in this interval not displayed.         Recent Labs     09/18/24  0206   TRIGLYCERIDE 68   HDL 54   LDL 26       Imaging  CT-CTA CHEST PULMONARY ARTERY W/ RECONS   Final Result      1.  No evidence of pulmonary embolism.   2.  L2 vertebral body superior endplate collapse, partly in the field-of-view.   3.  Cardiomegaly.   4.  Coronary calcification.   5.  No acute infiltrates.      Fleischner Society pulmonary nodule recommendations:         US-EXTREMITY VENOUS LOWER BILAT   Final Result      DX-TIBIA AND FIBULA RIGHT   Final Result      Demineralization, postsurgical and degenerative change without acute fracture or malalignment of the tibia and fibula.           Assessment/Plan  Problem Representation:    * Leg swelling-  (present on admission)  Assessment & Plan  Patient denies shortness of breath, chest pain, orthopnea, dyspnea.  - No signs of heart failure.  - Probably secondary to DVT  - Pro .  - Daily weights.  - I/O charting  - Daily CMP         DVT (deep venous thrombosis) (CMS-HCC)- (present on admission)  Assessment & Plan  Patient presented with increased bilateral lower extremity swelling 09/17   - Ultrasound bilateral lower extremity 09/17- showed acute to subacute, partially occlusive thrombus of the right distal common femoral vein. Acute to subacute, partially occlusive thrombus in the right proximal,middle and distal femoral vein.  -Was previously on Xarelto.  Will hold.  09/17  - Started on Lovenox in the  mg.  Will hold 09/17  - Heparin Infusion started 09/18. Can consider shifting to Xarelto once BP normalizes   - CT for PE negative  - PT/INR monitoring  - Hematology consulted for possible Xarelto failure       Atrial fibrillation (HCC)- (present on admission)  Assessment & Plan  Patient does not remember taking any cardiac medications.  - Metoprolol on hold.  - Will monitor on telemetry      Hypothyroidism- (present on admission)  Assessment & Plan  Her TSH is 72.5 and her free T4 is 0.66.  When inquired about her taking thyroid medications she said that she is not aware of any thyroid medications that she takes.  - Patient counseled   - Home dose of Levothyroxine resumed     Mood disorder (HCC)- (present on admission)  Assessment & Plan  Will continue home medications.         VTE prophylaxis: heparin ppx    I have performed a physical exam and reviewed and updated ROS and Plan today (9/19/2024). In review of yesterday's note (9/18/2024), there are no changes except as documented above.

## 2024-09-19 NOTE — FACE TO FACE
Face to Face Note  -  Durable Medical Equipment    Christy Guadalupe M.D. - NPI: [id number]  I certify that this patient is under my care and that they had a durable medical equipment(DME)face to face encounter by the physician assistant working collaboratively with me that meets the physician DME face-to-face encounter requirements with this patient on:    Date of encounter:   Patient:                    MRN:                       YOB: 2025  Nieves Murphy  [id number]  1955     The encounter with the patient was in whole, or in part, for the following medical condition, which is the primary reason for durable medical equipment:  Other - Chronic DVT, Arthritis     I certify that, based on my findings, the following durable medical equipment is medically necessary:    Walkers and Wheelchair   Patient needs manual wheelchair for use inside the home based on the above diagnosis. Per guidelines patient meets criteria in the following ways:   A.  Patient has significant impairment in the following  walking   and is places the patient at a heightened risk of morbidity.   B.  The patient's mobility limitations cannot be sufficiently resolved by use of  fitted cane or walker.   C.  The patient reports his home provides adequate access between rooms,  maneuvering space, and surfaces for use of the manual wheelchair that is  provided.   D.  The use of the manual wheelchair will significantly improve the patient's  ability to participate in MRADLs and the patient will use it on a regular basis in  the home.   E.  The patient has not expressed an unwillingness to use the manual  wheelchair.   F. The patient has limitations of strength, endurance, range of motion, or coordination per OT notes:.    My Clinical findings support the need for the above equipment due to:  Abnormal Gait, Chronic DVT and Osteosrthritis

## 2024-09-19 NOTE — CARE PLAN
The patient is Stable - Low risk of patient condition declining or worsening    Shift Goals  Clinical Goals: heparin drip and labs  Patient Goals: comfort, rest  Family Goals: JANETH    Progress made toward(s) clinical / shift goals:        Problem: Fall Risk  Goal: Patient will remain free from falls  Outcome: Progressing  Note: Patient remained free from falls. All fall precautions in place. Patient educated the need to call when needed assistance, patient verbalized understanding. Call light and personal belongings within reach., maintained clutter free environment and well lit room.        Problem: Skin Integrity  Goal: Skin integrity is maintained or improved  Outcome: Progressing  Note: On Q2 turns, Pt educated about the importance of frequent repositioning to prevent skin breakdown. Encouraged turning in bed and notifying staff for help. Pt verbalized understanding. Appropriate dressings in place. Extra pillows in place for comfort, between knees, and to float heels. Barrier cream applied as appropriate. Pt cleaned and linens changed frequently as appropriate.             Patient is not progressing towards the following goals:

## 2024-09-19 NOTE — PROGRESS NOTES
Critical value received from Lab, APTT of 104.6, adjusted Heparin drip, with 2 RN sign off. Dr. Pappas made aware.

## 2024-09-19 NOTE — PROGRESS NOTES
Patient complaining of right sided leg pain and wanting something stronger than tylenol. Night shift resident informed. New medication added, see MAR. Nightshift RN updated.

## 2024-09-19 NOTE — PROGRESS NOTES
Per pharmacy, go to maintenance dosing with heparin drip due to her being on the XA protocol previously. 1730 APTT 175. According to weight-based protocol, hold drip for one hour then reduce drip by a large dose. Discussed with pharmacist Meg Saul. Provider aware.

## 2024-09-20 LAB
ALBUMIN SERPL BCP-MCNC: 2.6 G/DL (ref 3.2–4.9)
ALBUMIN/GLOB SERPL: 1 G/DL
ALP SERPL-CCNC: 95 U/L (ref 30–99)
ALT SERPL-CCNC: 5 U/L (ref 2–50)
ANION GAP SERPL CALC-SCNC: 7 MMOL/L (ref 7–16)
APTT PPP: 123 SEC (ref 24.7–36)
APTT PPP: 42 SEC (ref 24.7–36)
AST SERPL-CCNC: 14 U/L (ref 12–45)
BASOPHILS # BLD AUTO: 0.2 % (ref 0–1.8)
BASOPHILS # BLD: 0.01 K/UL (ref 0–0.12)
BILIRUB SERPL-MCNC: 0.2 MG/DL (ref 0.1–1.5)
BUN SERPL-MCNC: 5 MG/DL (ref 8–22)
CALCIUM ALBUM COR SERPL-MCNC: 9.2 MG/DL (ref 8.5–10.5)
CALCIUM SERPL-MCNC: 8.1 MG/DL (ref 8.5–10.5)
CHLORIDE SERPL-SCNC: 106 MMOL/L (ref 96–112)
CO2 SERPL-SCNC: 23 MMOL/L (ref 20–33)
CORTIS SERPL-MCNC: 124 UG/DL (ref 0–23)
CREAT SERPL-MCNC: 0.53 MG/DL (ref 0.5–1.4)
EOSINOPHIL # BLD AUTO: 0 K/UL (ref 0–0.51)
EOSINOPHIL NFR BLD: 0 % (ref 0–6.9)
ERYTHROCYTE [DISTWIDTH] IN BLOOD BY AUTOMATED COUNT: 62.7 FL (ref 35.9–50)
GFR SERPLBLD CREATININE-BSD FMLA CKD-EPI: 99 ML/MIN/1.73 M 2
GLOBULIN SER CALC-MCNC: 2.7 G/DL (ref 1.9–3.5)
GLUCOSE BLD STRIP.AUTO-MCNC: 139 MG/DL (ref 65–99)
GLUCOSE BLD STRIP.AUTO-MCNC: 159 MG/DL (ref 65–99)
GLUCOSE BLD STRIP.AUTO-MCNC: 162 MG/DL (ref 65–99)
GLUCOSE BLD STRIP.AUTO-MCNC: 174 MG/DL (ref 65–99)
GLUCOSE BLD STRIP.AUTO-MCNC: 181 MG/DL (ref 65–99)
GLUCOSE SERPL-MCNC: 158 MG/DL (ref 65–99)
HCT VFR BLD AUTO: 31 % (ref 37–47)
HGB BLD-MCNC: 9.7 G/DL (ref 12–16)
IMM GRANULOCYTES # BLD AUTO: 0.02 K/UL (ref 0–0.11)
IMM GRANULOCYTES NFR BLD AUTO: 0.5 % (ref 0–0.9)
LYMPHOCYTES # BLD AUTO: 0.44 K/UL (ref 1–4.8)
LYMPHOCYTES NFR BLD: 9.9 % (ref 22–41)
MAGNESIUM SERPL-MCNC: 1.8 MG/DL (ref 1.5–2.5)
MCH RBC QN AUTO: 27.5 PG (ref 27–33)
MCHC RBC AUTO-ENTMCNC: 31.3 G/DL (ref 32.2–35.5)
MCV RBC AUTO: 87.8 FL (ref 81.4–97.8)
MONOCYTES # BLD AUTO: 0.09 K/UL (ref 0–0.85)
MONOCYTES NFR BLD AUTO: 2 % (ref 0–13.4)
NEUTROPHILS # BLD AUTO: 3.87 K/UL (ref 1.82–7.42)
NEUTROPHILS NFR BLD: 87.4 % (ref 44–72)
NRBC # BLD AUTO: 0 K/UL
NRBC BLD-RTO: 0 /100 WBC (ref 0–0.2)
PHOSPHATE SERPL-MCNC: 4.3 MG/DL (ref 2.5–4.5)
PLATELET # BLD AUTO: 199 K/UL (ref 164–446)
PMV BLD AUTO: 10.3 FL (ref 9–12.9)
POTASSIUM SERPL-SCNC: 4.3 MMOL/L (ref 3.6–5.5)
PROT SERPL-MCNC: 5.3 G/DL (ref 6–8.2)
RBC # BLD AUTO: 3.53 M/UL (ref 4.2–5.4)
SODIUM SERPL-SCNC: 136 MMOL/L (ref 135–145)
WBC # BLD AUTO: 4.4 K/UL (ref 4.8–10.8)

## 2024-09-20 PROCEDURE — 700102 HCHG RX REV CODE 250 W/ 637 OVERRIDE(OP): Performed by: INTERNAL MEDICINE

## 2024-09-20 PROCEDURE — A9270 NON-COVERED ITEM OR SERVICE: HCPCS | Performed by: INTERNAL MEDICINE

## 2024-09-20 PROCEDURE — 82533 TOTAL CORTISOL: CPT

## 2024-09-20 PROCEDURE — 83735 ASSAY OF MAGNESIUM: CPT

## 2024-09-20 PROCEDURE — 700102 HCHG RX REV CODE 250 W/ 637 OVERRIDE(OP)

## 2024-09-20 PROCEDURE — 700111 HCHG RX REV CODE 636 W/ 250 OVERRIDE (IP): Mod: JZ

## 2024-09-20 PROCEDURE — 700101 HCHG RX REV CODE 250

## 2024-09-20 PROCEDURE — A9270 NON-COVERED ITEM OR SERVICE: HCPCS

## 2024-09-20 PROCEDURE — 80053 COMPREHEN METABOLIC PANEL: CPT

## 2024-09-20 PROCEDURE — 770020 HCHG ROOM/CARE - TELE (206)

## 2024-09-20 PROCEDURE — 97530 THERAPEUTIC ACTIVITIES: CPT

## 2024-09-20 PROCEDURE — 84100 ASSAY OF PHOSPHORUS: CPT

## 2024-09-20 PROCEDURE — 99232 SBSQ HOSP IP/OBS MODERATE 35: CPT | Mod: GC | Performed by: INTERNAL MEDICINE

## 2024-09-20 PROCEDURE — 36415 COLL VENOUS BLD VENIPUNCTURE: CPT

## 2024-09-20 PROCEDURE — 85025 COMPLETE CBC W/AUTO DIFF WBC: CPT

## 2024-09-20 PROCEDURE — 85730 THROMBOPLASTIN TIME PARTIAL: CPT

## 2024-09-20 PROCEDURE — 82962 GLUCOSE BLOOD TEST: CPT

## 2024-09-20 RX ORDER — OXYCODONE HYDROCHLORIDE 5 MG/1
5 TABLET ORAL EVERY 4 HOURS PRN
Status: COMPLETED | OUTPATIENT
Start: 2024-09-20 | End: 2024-09-20

## 2024-09-20 RX ADMIN — GABAPENTIN 100 MG: 100 CAPSULE ORAL at 06:18

## 2024-09-20 RX ADMIN — LIDOCAINE 1 PATCH: 4 PATCH TOPICAL at 18:48

## 2024-09-20 RX ADMIN — RIVAROXABAN 15 MG: 15 TABLET, FILM COATED ORAL at 20:25

## 2024-09-20 RX ADMIN — TRAZODONE HYDROCHLORIDE 25 MG: 50 TABLET ORAL at 20:19

## 2024-09-20 RX ADMIN — OXYCODONE HYDROCHLORIDE 5 MG: 5 TABLET ORAL at 21:46

## 2024-09-20 RX ADMIN — ALPRAZOLAM 0.25 MG: 0.25 TABLET ORAL at 00:27

## 2024-09-20 RX ADMIN — OXYCODONE HYDROCHLORIDE 5 MG: 5 TABLET ORAL at 16:50

## 2024-09-20 RX ADMIN — HYDROCORTISONE SODIUM SUCCINATE 100 MG: 100 INJECTION, POWDER, FOR SOLUTION INTRAMUSCULAR; INTRAVENOUS at 20:20

## 2024-09-20 RX ADMIN — OXYCODONE HYDROCHLORIDE 5 MG: 5 TABLET ORAL at 08:38

## 2024-09-20 RX ADMIN — GABAPENTIN 100 MG: 100 CAPSULE ORAL at 20:20

## 2024-09-20 RX ADMIN — RIVAROXABAN 15 MG: 15 TABLET, FILM COATED ORAL at 09:38

## 2024-09-20 RX ADMIN — HYDROCORTISONE SODIUM SUCCINATE 100 MG: 100 INJECTION, POWDER, FOR SOLUTION INTRAMUSCULAR; INTRAVENOUS at 15:06

## 2024-09-20 RX ADMIN — INSULIN HUMAN 1 UNITS: 100 INJECTION, SOLUTION PARENTERAL at 06:15

## 2024-09-20 RX ADMIN — POTASSIUM CHLORIDE 40 MEQ: 1500 TABLET, EXTENDED RELEASE ORAL at 06:18

## 2024-09-20 RX ADMIN — HYDROCORTISONE SODIUM SUCCINATE 100 MG: 100 INJECTION, POWDER, FOR SOLUTION INTRAMUSCULAR; INTRAVENOUS at 06:18

## 2024-09-20 RX ADMIN — HYDROCORTISONE SODIUM SUCCINATE 100 MG: 100 INJECTION, POWDER, FOR SOLUTION INTRAMUSCULAR; INTRAVENOUS at 00:15

## 2024-09-20 RX ADMIN — INSULIN HUMAN 1 UNITS: 100 INJECTION, SOLUTION PARENTERAL at 00:26

## 2024-09-20 RX ADMIN — GABAPENTIN 100 MG: 100 CAPSULE ORAL at 15:06

## 2024-09-20 RX ADMIN — LEVOTHYROXINE SODIUM 125 MCG: 0.12 TABLET ORAL at 06:18

## 2024-09-20 RX ADMIN — ATORVASTATIN CALCIUM 10 MG: 10 TABLET, FILM COATED ORAL at 06:18

## 2024-09-20 RX ADMIN — INSULIN HUMAN 1 UNITS: 100 INJECTION, SOLUTION PARENTERAL at 12:28

## 2024-09-20 ASSESSMENT — COGNITIVE AND FUNCTIONAL STATUS - GENERAL
SUGGESTED CMS G CODE MODIFIER MOBILITY: CK
WALKING IN HOSPITAL ROOM: A LOT
DRESSING REGULAR LOWER BODY CLOTHING: A LITTLE
SUGGESTED CMS G CODE MODIFIER DAILY ACTIVITY: CJ
CLIMB 3 TO 5 STEPS WITH RAILING: TOTAL
TOILETING: A LITTLE
DAILY ACTIVITIY SCORE: 22
MOBILITY SCORE: 19

## 2024-09-20 ASSESSMENT — GAIT ASSESSMENTS
GAIT LEVEL OF ASSIST: STANDBY ASSIST
ASSISTIVE DEVICE: FRONT WHEEL WALKER
DISTANCE (FEET): 2

## 2024-09-20 ASSESSMENT — ENCOUNTER SYMPTOMS
WEAKNESS: 1
CARDIOVASCULAR NEGATIVE: 1
FALLS: 1
EYES NEGATIVE: 1
RESPIRATORY NEGATIVE: 1
GASTROINTESTINAL NEGATIVE: 1
PSYCHIATRIC NEGATIVE: 1
CONSTITUTIONAL NEGATIVE: 1

## 2024-09-20 ASSESSMENT — FIBROSIS 4 INDEX: FIB4 SCORE: 2.2

## 2024-09-20 ASSESSMENT — PAIN DESCRIPTION - PAIN TYPE
TYPE: ACUTE PAIN

## 2024-09-20 NOTE — CARE PLAN
The patient is Stable - Low risk of patient condition declining or worsening    Shift Goals  Clinical Goals: monitor heparin gtt and VS  Patient Goals: to sleep  Family Goals: JANETH    Progress made toward(s) clinical / shift goals:    Problem: Fall Risk  Goal: Patient will remain free from falls  Outcome: Progressing  Note: Bed in lowest position, wheels locked, call bell within reach, and bed alarm on.      Problem: Skin Integrity  Goal: Skin integrity is maintained or improved  Outcome: Progressing  Note: Patient's heels elevated off of bed and q2 turns performed.        Patient is not progressing towards the following goals:

## 2024-09-20 NOTE — THERAPY
Physical Therapy   Daily Treatment     Patient Name: Nieves Murphy  Age:  69 y.o., Sex:  female  Medical Record #: [id number]  Today's Date: 9/20/2024     Precautions  Precautions: Fall Risk    Assessment    Pt continues with improving upright tolerance; BP stable with change in positioning and activity; pt does not walk and has not done so in years but does pivot with stand by assist with FWW/without; pt demonstrating independence with HEP/ROM;     Plan    Treatment Plan Status: dc, goals met; please reconsult should condition change     DC Equipment Recommendations: None  Discharge Recommendations: defer to OT/medical needs for home health, from PT perspective, pt is at functional baseline and independent with HEP     Abridged Subjective/Objective     09/20/24 1015   Cognition    Cognition / Consciousness WDL   Level of Consciousness Alert   Comments pleasant and cooperative;   Passive ROM Lower Body   Passive ROM Lower Body WDL   Strength Lower Body   Lower Body Strength  WDL   Sensation Lower Body   Lower Extremity Sensation   WDL   Balance   Sitting Balance (Static) Fair +   Sitting Balance (Dynamic) Fair +   Standing Balance (Static) Fair   Standing Balance (Dynamic) Fair   Weight Shift Sitting Good   Weight Shift Standing Fair   Skilled Intervention Tactile Cuing;Verbal Cuing;Sequencing   Comments B UE support in sitting/standing; no loss of balance good eccentric control   Bed Mobility    Supine to Sit Modified Independent  (increasd time; use of railing to the left)   Sit to Supine   (NT, in chair post)   Gait Analysis   Gait Level Of Assist Standby Assist   Assistive Device Front Wheel Walker   Distance (Feet) 2   Weight Bearing Status full   Vision Deficits Impacting Mobility denies   Skilled Intervention Postural Facilitation;Tactile Cuing;Verbal Cuing   Comments able to take side steps toward chair   Functional Mobility   Sit to Stand Standby Assist   Bed, Chair, Wheelchair Transfer Standby  Assist  (with FWW)   Short Term Goals    Short Term Goal # 1 Pt will be independence in HEP aimed at circulation within 6 visits to reduce clot risk.   Goal Outcome # 1 Goal met   Education Group   Additional Comments pt remembered and demo'd ankle/knee ROM for circulatory management

## 2024-09-20 NOTE — CARE PLAN
The patient is Stable - Low risk of patient condition declining or worsening    Shift Goals  Clinical Goals: monitor heparin, vital signs  Patient Goals: sit in chair  Family Goals: JANETH    Progress made toward(s) clinical / shift goals:    Problem: Fall Risk  Goal: Patient will remain free from falls  9/20/2024 1602 by Cori Olvera R.N.  Outcome: Progressing  Note: Patient is free from falls, uses call light for assistance, bed in low and locked position, call light within reach. Patient sat in chair for lunch, now back in bed, bed alarm on.   9/20/2024 1557 by Cori Olvera RKyaraNKyara  Outcome: Progressing  Note: Patient using call button for assistance, bad in low and locked position, personal belongings within reach. Patient sat in chair during lunch, now back in bed, bed alarm on.     Problem: Pain - Standard  Goal: Alleviation of pain or a reduction in pain to the patient’s comfort goal  Outcome: Progressing  Note: Patient satisfied with pain management.

## 2024-09-20 NOTE — THERAPY
Occupational Therapy  Daily Treatment     Patient Name: Nieves Murphy  Age:  69 y.o., Sex:  female  Medical Record #: [id number]  Today's Date: 9/20/2024     Precautions  Precautions: Fall Risk    Assessment    Pt currently limited by decreased functional mobility, activity tolerance, strength, balance, and pain which are affecting pt's ability to complete ADLs/IADLs at baseline. Pt would benefit from OT services in the acute care setting to maximize functional recovery.      Plan    Treatment Plan Status: (P) Continue Current Treatment Plan  Type of Treatment: Self Care / Activities of Daily Living, Therapeutic Activity  Treatment Frequency: 3 Times per Week  Treatment Duration: Until Therapy Goals Met       Discharge Recommendations: (P) Anticipate that the patient will have no further occupational therapy needs after discharge from the hospital          09/20/24 1039   Strength Upper Body   Comments 4/5 BUEs   Balance   Sitting Balance (Static) Fair +   Sitting Balance (Dynamic) Fair   Standing Balance (Static) Fair   Standing Balance (Dynamic) Fair   Weight Shift Sitting Good   Weight Shift Standing Fair   Activities of Daily Living   Grooming Supervision   Upper Body Dressing Supervision   Lower Body Dressing Minimal Assist   Functional Mobility   Sit to Stand Standby Assist   Bed, Chair, Wheelchair Transfer Standby Assist   Short Term Goals   Short Term Goal # 1 supervised with LB dressing   Goal Outcome # 1 Progressing as expected   Short Term Goal # 2 supervised with ADL txfs   Goal Outcome # 2 Progressing as expected   Occupational Therapy Treatment Plan    O.T. Treatment Plan Continue Current Treatment Plan   Anticipated Discharge Equipment and Recommendations   Discharge Recommendations Anticipate that the patient will have no further occupational therapy needs after discharge from the hospital

## 2024-09-20 NOTE — PROGRESS NOTES
[facility] Internal Medicine Daily Progress Note    Date of Service  9/20/2024    [employer] Team: R IM Green Team   Attending: Jeremy Strange M.d.  Senior Resident: Dr. Larry Castaneda  Intern:  Dr. Christy Guadalupe  Contact Number: [phone]    Chief Complaint    Nieves Murphy is a 69 y.o. female admitted 9/17/2024 with Leg Swelling     Hospital Course    Nieves Murphy is a pleasant 69 y.o. female with a past medical history of diabetes, arthritis, anxiety, hypothyroidism and chronic lower extremity DVT, who presented to the ED on 9/17/2024 with right leg swelling and pain.     According to the patient she was in her usual state of health and doing well until 4 months ago when she had a fall.  At that time she felt like her right leg bone was broken but she did not come to the hospital or get checked.  She feels like the swelling has been present since then and it has been constant.  She was okay with the swelling until a few days ago when she ran into a table and hurt her right leg.  Since then, the swelling has been worsening, and it is associated with pain.  She denies any other associated symptoms including chest pain or shortness of breath.  She has had a previous history of left lower extremity DVT, (which she is unable to recall how many years ago) but has been on Xarelto since then.  She claims that she is completely compliant with all her medications and do not miss any of her pills.  She also adds that she has been bedridden since 2010 due to worsening arthritis and uses a wheelchair to ambulate.     Her caregiver adds that for the past few weeks, when she sits in her chair her foot starts swelling. He also noticed some tender points on his on her right lower leg.    ED Management:  - Vital signs were checked which were stable.  - Patient started on Lovenox 120 mg.  - X-ray tibia/fibula done which was unremarkable.  - Her CBC was found to be normal, Chem panel showed low potassium levels.  - Her pro BNP was  found to be 719.  - CT for PE ordered that showed cardiomegaly and coronary calcification.  - Ultrasound bilateral lower extremity showed acute to subacute, partially occlusive thrombus of the right distal common femoral vein. Acute to subacute, partially occlusive thrombus in the right proximal,middle and distal femoral vein.     She was admitted on the floor for further assessment and management of right lower extremity DVT    09/18- Heparin infusion was started at 2 AM 09/18.Her TSH was high, 72.5 and her free T4 was found to be 0.66.  When inquired about her taking thyroid medications she said that she is not aware of any thyroid medications that she takes. Home dose of Levothyroxine resumed 09/18. Her HbA1c was found to be 5.3%.    Hematology consulted for possible Xeralto failure, she disclosed to  them that she has been non compliant with her medications and that might have caused the blood clots. Further work up deferred.     09/19- She had persistently low blood pressures.  Was initially given 2 boluses, but her blood pressures remained soft.Rapid response team rounded on her for low blood pressures.She was started on hydrocortisone 100 mg 3 times daily IV + Midodrine 5mg TID-PRN.  Changed from Heparin to oral therapy was deferred due to her unstable blood pressures.  She was monitored for hypotension. Hemoglobin stable. Orthostatics were negative.    Interval Problem Update    Overnight events:   - She had bilateral leg pain- 5mg Oxycodone given     She states that she feels okay. She does not have any active complaints.  - She denies any chest pain, shortness of breath or leg pain.  - Her blood pressures are still soft, with diastolic ranging between 30 to 50 mmHg. Will monitor for hypotension  and start tapering down IV steroids once blood pressures normalize.   - Heparin drip stopped 09/20.  Patient shifted back to Xarelto. Will monitor.  - Ambulated out of bed today 09/20.  - Home health ordered. Has  Approval.     I have discussed this patient's plan of care and discharge plan at IDT rounds today with Case Management, Nursing, Nursing leadership, and other members of the IDT team.    Consultants/Specialty  Hematology    Code Status  Full Code    Disposition  The patient is not medically cleared for discharge to home or a post-acute facility.      Review of Systems  Review of Systems   Constitutional: Negative.    HENT: Negative.     Eyes: Negative.    Respiratory: Negative.     Cardiovascular: Negative.    Gastrointestinal: Negative.    Genitourinary: Negative.    Musculoskeletal:  Positive for falls.        Right leg pain    Skin: Negative.    Neurological:  Positive for weakness.   Endo/Heme/Allergies: Negative.    Psychiatric/Behavioral: Negative.          Physical Exam  Temp:  [36 °C (96.8 °F)-37.1 °C (98.7 °F)] 37.1 °C (98.7 °F)  Pulse:  [50-80] 60  Resp:  [18] 18  BP: ()/(39-65) 108/55  SpO2:  [88 %-94 %] 91 %    Physical Exam  Constitutional:       Appearance: Normal appearance. She is obese.   HENT:      Head: Normocephalic and atraumatic.      Right Ear: Tympanic membrane, ear canal and external ear normal.      Left Ear: Tympanic membrane, ear canal and external ear normal.      Nose: Nose normal.      Mouth/Throat:      Mouth: Mucous membranes are moist.   Eyes:      Extraocular Movements: Extraocular movements intact.      Pupils: Pupils are equal, round, and reactive to light.   Cardiovascular:      Rate and Rhythm: Normal rate and regular rhythm.      Pulses: Normal pulses.      Heart sounds: Normal heart sounds.   Pulmonary:      Effort: Pulmonary effort is normal.      Breath sounds: Normal breath sounds.   Abdominal:      General: Bowel sounds are normal.      Palpations: Abdomen is soft.   Musculoskeletal:         General: Swelling and tenderness present.      Cervical back: Normal range of motion and neck supple.      Right lower leg: Edema present.      Left lower leg: Edema present.    Skin:     General: Skin is warm.      Capillary Refill: Capillary refill takes less than 2 seconds.   Neurological:      General: No focal deficit present.      Mental Status: She is alert. Mental status is at baseline.   Psychiatric:         Mood and Affect: Mood normal.         Behavior: Behavior normal.         Thought Content: Thought content normal.         Judgment: Judgment normal.         Fluids    Intake/Output Summary (Last 24 hours) at 9/20/2024 1121  Last data filed at 9/20/2024 0400  Gross per 24 hour   Intake 0 ml   Output 2800 ml   Net -2800 ml       Laboratory  Recent Labs     09/19/24  0029 09/19/24  1409 09/20/24  0005   WBC 6.7 4.9 4.4*   RBC 3.65* 3.93* 3.53*   HEMOGLOBIN 10.2* 10.6* 9.7*   HEMATOCRIT 32.0* 33.9* 31.0*   MCV 87.7 86.3 87.8   MCH 27.9 27.0 27.5   MCHC 31.9* 31.3* 31.3*   RDW 64.3* 62.3* 62.7*   PLATELETCT 222 208 199   MPV 9.9 10.1 10.3     Recent Labs     09/18/24  1554 09/18/24  2030 09/19/24  0029 09/20/24  0005   SODIUM 138  --  141 136   POTASSIUM 4.6 4.5 4.2 4.3   CHLORIDE 105  --  109 106   CO2 25  --  24 23   GLUCOSE 96  --  95 158*   BUN 5*  --  5* 5*   CREATININE 0.55  --  0.38* 0.53   CALCIUM 8.1*  --  8.0* 8.1*     Recent Labs     09/18/24  0206 09/18/24  0908 09/19/24  0029 09/19/24  0914 09/19/24  2309 09/20/24  0005 09/20/24  0910   APTT 34.5   < > 104.6*   < > 125.2* 123.0* 42.0*   INR 1.48*  --  1.24*  --   --   --   --     < > = values in this interval not displayed.         Recent Labs     09/18/24  0206   TRIGLYCERIDE 68   HDL 54   LDL 26       Imaging  CT-CTA CHEST PULMONARY ARTERY W/ RECONS   Final Result      1.  No evidence of pulmonary embolism.   2.  L2 vertebral body superior endplate collapse, partly in the field-of-view.   3.  Cardiomegaly.   4.  Coronary calcification.   5.  No acute infiltrates.      Fleischner Society pulmonary nodule recommendations:         US-EXTREMITY VENOUS LOWER BILAT   Final Result      DX-TIBIA AND FIBULA RIGHT   Final  Result      Demineralization, postsurgical and degenerative change without acute fracture or malalignment of the tibia and fibula.           Assessment/Plan  Problem Representation:    * Leg swelling- (present on admission)  Assessment & Plan  Patient denies shortness of breath, chest pain, orthopnea, dyspnea.  - No signs of heart failure.  - Probably secondary to DVT  - Pro .  - Daily weights.  - I/O charting  - PT/OT  - Daily CMP         DVT (deep venous thrombosis) (CMS-HCC)- (present on admission)  Assessment & Plan  Patient presented with increased bilateral lower extremity swelling 09/17   - Ultrasound bilateral lower extremity 09/17- showed acute to subacute, partially occlusive thrombus of the right distal common femoral vein. Acute to subacute, partially occlusive thrombus in the right proximal,middle and distal femoral vein.  - Was previously on Xarelto.  Will hold 09/17  - Resumed Xarelto 09/20.  - Started on Lovenox in the  mg.  Will hold 09/17  - Heparin Infusion started 09/18. Discontinued   - CT for PE negative  - PT/INR monitoring  - Hematology consulted for possible Xarelto failure - Ruled out 09/18      Atrial fibrillation (HCC)- (present on admission)  Assessment & Plan  Patient does not remember taking any cardiac medications.  - Metoprolol on hold.  - Will monitor on telemetry      Hypotension  Assessment & Plan  - Started on IV Hyrdrocotisone 100mg TID. 09/19.   - Morning cortisol ordered   - Orthostatics: Negative  - On Midodrine 5mg PRN   - Hb stable  - Will monitor    Hypothyroidism- (present on admission)  Assessment & Plan  Her TSH is 72.5 and her free T4 is 0.66.  When inquired about her taking thyroid medications she said that she is not aware of any thyroid medications that she takes.  - Patient counseled   - Home dose of Levothyroxine resumed     Mood disorder (HCC)- (present on admission)  Assessment & Plan  Will continue home medications.         VTE prophylaxis: heparin  ppx    I have performed a physical exam and reviewed and updated ROS and Plan today (9/20/2024). In review of yesterday's note (9/19/2024), there are no changes except as documented above.

## 2024-09-20 NOTE — PROGRESS NOTES
Patient BP 71/44 asymptomatic. Provider notified. Bolus orders. Given per MAR. 90/50 post bolus. Provider aware.

## 2024-09-21 LAB
ANION GAP SERPL CALC-SCNC: 10 MMOL/L (ref 7–16)
BUN SERPL-MCNC: 10 MG/DL (ref 8–22)
CALCIUM SERPL-MCNC: 8.4 MG/DL (ref 8.5–10.5)
CHLORIDE SERPL-SCNC: 101 MMOL/L (ref 96–112)
CO2 SERPL-SCNC: 25 MMOL/L (ref 20–33)
CREAT SERPL-MCNC: 0.5 MG/DL (ref 0.5–1.4)
GFR SERPLBLD CREATININE-BSD FMLA CKD-EPI: 100 ML/MIN/1.73 M 2
GLUCOSE BLD STRIP.AUTO-MCNC: 119 MG/DL (ref 65–99)
GLUCOSE BLD STRIP.AUTO-MCNC: 152 MG/DL (ref 65–99)
GLUCOSE BLD STRIP.AUTO-MCNC: 171 MG/DL (ref 65–99)
GLUCOSE SERPL-MCNC: 150 MG/DL (ref 65–99)
POTASSIUM SERPL-SCNC: 3.6 MMOL/L (ref 3.6–5.5)
SODIUM SERPL-SCNC: 136 MMOL/L (ref 135–145)

## 2024-09-21 PROCEDURE — 700101 HCHG RX REV CODE 250

## 2024-09-21 PROCEDURE — 80048 BASIC METABOLIC PNL TOTAL CA: CPT

## 2024-09-21 PROCEDURE — 82962 GLUCOSE BLOOD TEST: CPT | Mod: 91

## 2024-09-21 PROCEDURE — 36415 COLL VENOUS BLD VENIPUNCTURE: CPT

## 2024-09-21 PROCEDURE — 700102 HCHG RX REV CODE 250 W/ 637 OVERRIDE(OP)

## 2024-09-21 PROCEDURE — 700111 HCHG RX REV CODE 636 W/ 250 OVERRIDE (IP): Mod: JZ | Performed by: INTERNAL MEDICINE

## 2024-09-21 PROCEDURE — 770020 HCHG ROOM/CARE - TELE (206)

## 2024-09-21 PROCEDURE — 99232 SBSQ HOSP IP/OBS MODERATE 35: CPT | Mod: GC | Performed by: INTERNAL MEDICINE

## 2024-09-21 PROCEDURE — A9270 NON-COVERED ITEM OR SERVICE: HCPCS

## 2024-09-21 PROCEDURE — 700111 HCHG RX REV CODE 636 W/ 250 OVERRIDE (IP): Mod: JZ

## 2024-09-21 PROCEDURE — A9270 NON-COVERED ITEM OR SERVICE: HCPCS | Performed by: INTERNAL MEDICINE

## 2024-09-21 PROCEDURE — 700102 HCHG RX REV CODE 250 W/ 637 OVERRIDE(OP): Performed by: INTERNAL MEDICINE

## 2024-09-21 RX ORDER — OXYCODONE HYDROCHLORIDE 5 MG/1
5 TABLET ORAL EVERY 4 HOURS PRN
Status: DISCONTINUED | OUTPATIENT
Start: 2024-09-21 | End: 2024-09-24 | Stop reason: HOSPADM

## 2024-09-21 RX ORDER — OXYCODONE HYDROCHLORIDE 5 MG/1
5 TABLET ORAL ONCE
Status: COMPLETED | OUTPATIENT
Start: 2024-09-21 | End: 2024-09-21

## 2024-09-21 RX ADMIN — GABAPENTIN 100 MG: 100 CAPSULE ORAL at 14:58

## 2024-09-21 RX ADMIN — GABAPENTIN 100 MG: 100 CAPSULE ORAL at 04:25

## 2024-09-21 RX ADMIN — HYDROCORTISONE SODIUM SUCCINATE 50 MG: 100 INJECTION, POWDER, FOR SOLUTION INTRAMUSCULAR; INTRAVENOUS at 14:59

## 2024-09-21 RX ADMIN — ATORVASTATIN CALCIUM 10 MG: 10 TABLET, FILM COATED ORAL at 04:24

## 2024-09-21 RX ADMIN — POTASSIUM CHLORIDE 40 MEQ: 1500 TABLET, EXTENDED RELEASE ORAL at 04:25

## 2024-09-21 RX ADMIN — INSULIN HUMAN 1 UNITS: 100 INJECTION, SOLUTION PARENTERAL at 13:16

## 2024-09-21 RX ADMIN — HYDROCORTISONE SODIUM SUCCINATE 100 MG: 100 INJECTION, POWDER, FOR SOLUTION INTRAMUSCULAR; INTRAVENOUS at 04:25

## 2024-09-21 RX ADMIN — GABAPENTIN 100 MG: 100 CAPSULE ORAL at 21:48

## 2024-09-21 RX ADMIN — ALPRAZOLAM 0.25 MG: 0.25 TABLET ORAL at 21:48

## 2024-09-21 RX ADMIN — HYDROCORTISONE SODIUM SUCCINATE 50 MG: 100 INJECTION, POWDER, FOR SOLUTION INTRAMUSCULAR; INTRAVENOUS at 21:33

## 2024-09-21 RX ADMIN — OXYCODONE HYDROCHLORIDE 5 MG: 5 TABLET ORAL at 11:23

## 2024-09-21 RX ADMIN — OXYCODONE HYDROCHLORIDE 5 MG: 5 TABLET ORAL at 04:24

## 2024-09-21 RX ADMIN — LEVOTHYROXINE SODIUM 125 MCG: 0.12 TABLET ORAL at 04:25

## 2024-09-21 RX ADMIN — RIVAROXABAN 15 MG: 15 TABLET, FILM COATED ORAL at 09:43

## 2024-09-21 RX ADMIN — LIDOCAINE 1 PATCH: 4 PATCH TOPICAL at 16:50

## 2024-09-21 RX ADMIN — TRAZODONE HYDROCHLORIDE 25 MG: 50 TABLET ORAL at 21:33

## 2024-09-21 RX ADMIN — RIVAROXABAN 15 MG: 15 TABLET, FILM COATED ORAL at 18:40

## 2024-09-21 RX ADMIN — INSULIN HUMAN 1 UNITS: 100 INJECTION, SOLUTION PARENTERAL at 05:56

## 2024-09-21 RX ADMIN — ALPRAZOLAM 0.25 MG: 0.25 TABLET ORAL at 18:40

## 2024-09-21 SDOH — ECONOMIC STABILITY: TRANSPORTATION INSECURITY
IN THE PAST 12 MONTHS, HAS THE LACK OF TRANSPORTATION KEPT YOU FROM MEDICAL APPOINTMENTS OR FROM GETTING MEDICATIONS?: NO

## 2024-09-21 SDOH — ECONOMIC STABILITY: TRANSPORTATION INSECURITY
IN THE PAST 12 MONTHS, HAS LACK OF RELIABLE TRANSPORTATION KEPT YOU FROM MEDICAL APPOINTMENTS, MEETINGS, WORK OR FROM GETTING THINGS NEEDED FOR DAILY LIVING?: NO

## 2024-09-21 ASSESSMENT — ENCOUNTER SYMPTOMS
COUGH: 0
VOMITING: 0
MYALGIAS: 0
SHORTNESS OF BREATH: 0
ORTHOPNEA: 0
NAUSEA: 0
HEARTBURN: 0
HEMOPTYSIS: 0
DIZZINESS: 0
TINGLING: 0
HEADACHES: 0
DOUBLE VISION: 0
BLURRED VISION: 0
CHILLS: 0
PALPITATIONS: 0
BRUISES/BLEEDS EASILY: 0
NECK PAIN: 0
FEVER: 0

## 2024-09-21 ASSESSMENT — PATIENT HEALTH QUESTIONNAIRE - PHQ9
1. LITTLE INTEREST OR PLEASURE IN DOING THINGS: NOT AT ALL
SUM OF ALL RESPONSES TO PHQ9 QUESTIONS 1 AND 2: 0
2. FEELING DOWN, DEPRESSED, IRRITABLE, OR HOPELESS: NOT AT ALL

## 2024-09-21 ASSESSMENT — SOCIAL DETERMINANTS OF HEALTH (SDOH)
WITHIN THE PAST 12 MONTHS, THE FOOD YOU BOUGHT JUST DIDN'T LAST AND YOU DIDN'T HAVE MONEY TO GET MORE: NEVER TRUE
WITHIN THE LAST YEAR, HAVE YOU BEEN HUMILIATED OR EMOTIONALLY ABUSED IN OTHER WAYS BY YOUR PARTNER OR EX-PARTNER?: NO
WITHIN THE LAST YEAR, HAVE YOU BEEN KICKED, HIT, SLAPPED, OR OTHERWISE PHYSICALLY HURT BY YOUR PARTNER OR EX-PARTNER?: NO
WITHIN THE LAST YEAR, HAVE TO BEEN RAPED OR FORCED TO HAVE ANY KIND OF SEXUAL ACTIVITY BY YOUR PARTNER OR EX-PARTNER?: NO
WITHIN THE LAST YEAR, HAVE YOU BEEN AFRAID OF YOUR PARTNER OR EX-PARTNER?: NO
WITHIN THE PAST 12 MONTHS, YOU WORRIED THAT YOUR FOOD WOULD RUN OUT BEFORE YOU GOT THE MONEY TO BUY MORE: NEVER TRUE
IN THE PAST 12 MONTHS, HAS THE ELECTRIC, GAS, OIL, OR WATER COMPANY THREATENED TO SHUT OFF SERVICE IN YOUR HOME?: NO

## 2024-09-21 ASSESSMENT — PAIN DESCRIPTION - PAIN TYPE
TYPE: ACUTE PAIN
TYPE: ACUTE PAIN

## 2024-09-21 ASSESSMENT — FIBROSIS 4 INDEX: FIB4 SCORE: 2.2

## 2024-09-21 NOTE — PROGRESS NOTES
[facility] Internal Medicine Daily Progress Note    Date of Service  9/21/2024    [employer] Team: R IM Green Team   Attending: Jeremy Strange M.d.  Senior Resident: Dr. Juarez  Intern:  Dr. Guadalupe   Contact Number: [phone]    Chief Complaint  Nieves Murphy is a 69 y.o. female admitted 9/17/2024 with Leg [facility] Hospital Course  Nieves Murphy is a pleasant 69 y.o. female with a past medical history of diabetes, arthritis, anxiety, hypothyroidism and chronic lower extremity DVT, who presented to the ED on 9/17/2024 with right leg swelling and pain.     According to the patient she was in her usual state of health and doing well until 4 months ago when she had a fall.  At that time she felt like her right leg bone was broken but she did not come to the hospital or get checked.  She feels like the swelling has been present since then and it has been constant.  She was okay with the swelling until a few days ago when she ran into a table and hurt her right leg.  Since then, the swelling has been worsening, and it is associated with pain.  She denies any other associated symptoms including chest pain or shortness of breath.  She has had a previous history of left lower extremity DVT, (which she is unable to recall how many years ago) but has been on Xarelto since then.  She claims that she is completely compliant with all her medications and do not miss any of her pills.  She also adds that she has been bedridden since 2010 due to worsening arthritis and uses a wheelchair to ambulate.     Her caregiver adds that for the past few weeks, when she sits in her chair her foot starts swelling. He also noticed some tender points on his on her right lower leg.    09/18- Heparin infusion was started at 2 AM 09/18.Her TSH was high, 72.5 and her free T4 was found to be 0.66.  When inquired about her taking thyroid medications she said that she is not aware of any thyroid medications that she takes. Home dose of  Levothyroxine resumed 09/18. Her HbA1c was found to be 5.3%.     Hematology consulted for possible Xeralto failure, she disclosed to  them that she has been non compliant with her medications and that might have caused the blood clots. Further work up deferred.      09/19- She had persistently low blood pressures.  Was initially given 2 boluses, but her blood pressures remained soft.Rapid response team rounded on her for low blood pressures.She was started on hydrocortisone 100 mg 3 times daily IV + Midodrine 5mg TID-PRN.  Changed from Heparin to oral therapy was deferred due to her unstable blood pressures.  She was monitored for hypotension. Hemoglobin stable. Orthostatics were negative.    9/20/24  Patient on hydrocortisone IV due to low blood pressures, anticoagulation medication was transition from heparin IV to Xarelto p.o..    Interval Problem Update  -Patient did  require pain medication overnight for which was given Oxycodone improving her pain over lower right extremity  -Patient was scheduled with oxycodone for her right lower extremity pain.  -Patient on hydrocortisone for low blood pressure, tapering from 100mg 3 times daily to 50mg 3 times daily will keep trending    I have discussed this patient's plan of care and discharge plan at IDT rounds today with Case Management, Nursing, Nursing leadership, and other members of the IDT team.    Consultants/Specialty  IM    Code Status  Full Code    Disposition  The patient is not medically cleared for discharge to home or a post-acute facility.      I have placed the appropriate orders for post-discharge needs.    Review of Systems  Review of Systems   Constitutional:  Negative for chills and fever.   HENT:  Negative for hearing loss and tinnitus.    Eyes:  Negative for blurred vision and double vision.   Respiratory:  Negative for cough, hemoptysis and shortness of breath.    Cardiovascular:  Positive for leg swelling. Negative for chest pain, palpitations  and orthopnea.   Gastrointestinal:  Negative for heartburn, nausea and vomiting.   Genitourinary:  Negative for dysuria, frequency and urgency.   Musculoskeletal:  Negative for myalgias and neck pain.   Neurological:  Negative for dizziness, tingling and headaches.   Endo/Heme/Allergies:  Negative for environmental allergies. Does not bruise/bleed easily.        Physical Exam  Temp:  [36.1 °C (97 °F)-36.9 °C (98.5 °F)] 36.1 °C (97 °F)  Pulse:  [64-76] 64  Resp:  [18] 18  BP: (105-129)/(53-71) 129/71  SpO2:  [92 %-95 %] 95 %    Physical Exam  Constitutional:       General: She is not in acute distress.     Appearance: She is not ill-appearing.   Eyes:      General:         Right eye: No discharge.         Left eye: No discharge.   Cardiovascular:      Rate and Rhythm: Normal rate.      Heart sounds: No murmur heard.     No friction rub. No gallop.   Pulmonary:      Effort: No respiratory distress.      Breath sounds: No stridor. No wheezing.   Abdominal:      General: There is no distension.      Palpations: There is no mass.      Tenderness: There is no abdominal tenderness.   Musculoskeletal:         General: Swelling and tenderness (right lower extremity) present.      Cervical back: No rigidity.      Right lower leg: Edema present.      Left lower leg: Edema present.   Lymphadenopathy:      Cervical: No cervical adenopathy.   Skin:     Capillary Refill: Capillary refill takes less than 2 seconds.      Coloration: Skin is not jaundiced or pale.      Findings: No bruising.   Neurological:      General: No focal deficit present.         Fluids    Intake/Output Summary (Last 24 hours) at 9/21/2024 1148  Last data filed at 9/21/2024 0400  Gross per 24 hour   Intake 680 ml   Output 2400 ml   Net -1720 ml       Laboratory  Recent Labs     09/19/24  0029 09/19/24  1409 09/20/24  0005   WBC 6.7 4.9 4.4*   RBC 3.65* 3.93* 3.53*   HEMOGLOBIN 10.2* 10.6* 9.7*   HEMATOCRIT 32.0* 33.9* 31.0*   MCV 87.7 86.3 87.8   MCH 27.9  27.0 27.5   MCHC 31.9* 31.3* 31.3*   RDW 64.3* 62.3* 62.7*   PLATELETCT 222 208 199   MPV 9.9 10.1 10.3     Recent Labs     09/18/24  1554 09/18/24  2030 09/19/24  0029 09/20/24  0005   SODIUM 138  --  141 136   POTASSIUM 4.6 4.5 4.2 4.3   CHLORIDE 105  --  109 106   CO2 25  --  24 23   GLUCOSE 96  --  95 158*   BUN 5*  --  5* 5*   CREATININE 0.55  --  0.38* 0.53   CALCIUM 8.1*  --  8.0* 8.1*     Recent Labs     09/19/24  0029 09/19/24  0914 09/19/24  2309 09/20/24  0005 09/20/24  0910   APTT 104.6*   < > 125.2* 123.0* 42.0*   INR 1.24*  --   --   --   --     < > = values in this interval not displayed.               Imaging  CT-CTA CHEST PULMONARY ARTERY W/ RECONS   Final Result      1.  No evidence of pulmonary embolism.   2.  L2 vertebral body superior endplate collapse, partly in the field-of-view.   3.  Cardiomegaly.   4.  Coronary calcification.   5.  No acute infiltrates.      Fleischner Society pulmonary nodule recommendations:         US-EXTREMITY VENOUS LOWER BILAT   Final Result      DX-TIBIA AND FIBULA RIGHT   Final Result      Demineralization, postsurgical and degenerative change without acute fracture or malalignment of the tibia and fibula.           Assessment/Plan  Problem Representation:    * Leg swelling- (present on admission)  Assessment & Plan  Patient denies shortness of breath, chest pain, orthopnea, dyspnea.  - No signs of heart failure.  - Probably secondary to DVT  - Pro .  - Daily weights.  - I/O charting  - PT/OT  - Daily CMP         Hypotension  Assessment & Plan  - tapering on IV Hyrdrocotisone 50mg TID. 09/21  - Morning cortisol ordered   - Orthostatics: Negative  - On Midodrine 5mg PRN   - Hb stable  - Will monitor    Hypothyroidism- (present on admission)  Assessment & Plan  Her TSH is 72.5 and her free T4 is 0.66.  When inquired about her taking thyroid medications she said that she is not aware of any thyroid medications that she takes.  - Patient counseled   - Home dose  of Levothyroxine resumed     Mood disorder (HCC)- (present on admission)  Assessment & Plan  Will continue home medications.    Atrial fibrillation (HCC)- (present on admission)  Assessment & Plan  Patient does not remember taking any cardiac medications.  - Metoprolol on hold.  - Will monitor on telemetry      DVT (deep venous thrombosis) (CMS-HCC)- (present on admission)  Assessment & Plan  Patient presented with increased bilateral lower extremity swelling 09/17   - Ultrasound bilateral lower extremity 09/17- showed acute to subacute, partially occlusive thrombus of the right distal common femoral vein. Acute to subacute, partially occlusive thrombus in the right proximal,middle and distal femoral vein.  - Was previously on Xarelto.  Will hold 09/17  - Resumed Xarelto 09/20.  - Started on Lovenox in the  mg.  Will hold 09/17  - Heparin Infusion started 09/18. Discontinued   - CT for PE negative  - PT/INR monitoring  - Hematology consulted for possible Xarelto failure - Ruled out 09/18           VTE prophylaxis: Xarelto    I have performed a physical exam and reviewed and updated ROS and Plan today (9/21/2024). In review of yesterday's note (9/20/2024), there are no changes except as documented above.

## 2024-09-21 NOTE — PROGRESS NOTES
"0400- Pt leg in 7/10 pain, but prn oxycodone order was \"completed\". On-call provider, Christos Pappas contacted to request prn pain medication for this pain level.  "

## 2024-09-21 NOTE — CARE PLAN
The patient is Stable - Low risk of patient condition declining or worsening    Shift Goals  Clinical Goals: pain control, rest  Patient Goals: rest, pain control  Family Goals: JANETH    Progress made toward(s) clinical / shift goals:    Problem: Pain - Standard  Goal: Alleviation of pain or a reduction in pain to the patient’s comfort goal  Outcome: Progressing  Note: Patient report decreased pain in left lower leg.      Problem: Fall Risk  Goal: Patient will remain free from falls  Outcome: Progressing  Note: Patient remains free from falls, uses call light, bed in low and locked position, all personal belongings and call light within reach,

## 2024-09-21 NOTE — CARE PLAN
The patient is Stable - Low risk of patient condition declining or worsening    Shift Goals  Clinical Goals: rest, pain control  Patient Goals: rest, pain control  Family Goals: JANETH    Progress made toward(s) clinical / shift goals:    Problem: Pain - Standard  Goal: Alleviation of pain or a reduction in pain to the patient’s comfort goal  Outcome: Progressing  Note: Pt pain well controlled with medication- see emar, rest, repositioning.     Problem: Fall Risk  Goal: Patient will remain free from falls  Outcome: Progressing  Note: Pt free from falls this shift. Safety measures in place including bed alarm. Pt verbalizes understanding on fall risk and preventive measures     Problem: Skin Integrity  Goal: Skin integrity is maintained or improved  Outcome: Progressing  Note: Pt LE elevated on pillows and pt on q2hr turns for preventative skin care       Patient is not progressing towards the following goals:

## 2024-09-22 LAB
EKG IMPRESSION: NORMAL
GLUCOSE BLD STRIP.AUTO-MCNC: 125 MG/DL (ref 65–99)
GLUCOSE BLD STRIP.AUTO-MCNC: 127 MG/DL (ref 65–99)
GLUCOSE BLD STRIP.AUTO-MCNC: 142 MG/DL (ref 65–99)
GLUCOSE BLD STRIP.AUTO-MCNC: 144 MG/DL (ref 65–99)
GLUCOSE BLD STRIP.AUTO-MCNC: 154 MG/DL (ref 65–99)

## 2024-09-22 PROCEDURE — 700101 HCHG RX REV CODE 250

## 2024-09-22 PROCEDURE — 700111 HCHG RX REV CODE 636 W/ 250 OVERRIDE (IP): Mod: JZ | Performed by: INTERNAL MEDICINE

## 2024-09-22 PROCEDURE — A9270 NON-COVERED ITEM OR SERVICE: HCPCS | Performed by: INTERNAL MEDICINE

## 2024-09-22 PROCEDURE — 770020 HCHG ROOM/CARE - TELE (206)

## 2024-09-22 PROCEDURE — 93005 ELECTROCARDIOGRAM TRACING: CPT

## 2024-09-22 PROCEDURE — 700102 HCHG RX REV CODE 250 W/ 637 OVERRIDE(OP): Performed by: INTERNAL MEDICINE

## 2024-09-22 PROCEDURE — A9270 NON-COVERED ITEM OR SERVICE: HCPCS

## 2024-09-22 PROCEDURE — 700102 HCHG RX REV CODE 250 W/ 637 OVERRIDE(OP)

## 2024-09-22 PROCEDURE — 93010 ELECTROCARDIOGRAM REPORT: CPT | Performed by: INTERNAL MEDICINE

## 2024-09-22 PROCEDURE — 82962 GLUCOSE BLOOD TEST: CPT | Mod: 91

## 2024-09-22 PROCEDURE — 99232 SBSQ HOSP IP/OBS MODERATE 35: CPT | Mod: GC | Performed by: INTERNAL MEDICINE

## 2024-09-22 RX ORDER — POLYETHYLENE GLYCOL 3350 17 G/17G
1 POWDER, FOR SOLUTION ORAL
Status: DISCONTINUED | OUTPATIENT
Start: 2024-09-22 | End: 2024-09-24 | Stop reason: HOSPADM

## 2024-09-22 RX ORDER — HYDROCORTISONE 10 MG/1
5 TABLET ORAL NIGHTLY
Status: DISCONTINUED | OUTPATIENT
Start: 2024-09-22 | End: 2024-09-23

## 2024-09-22 RX ORDER — AMOXICILLIN 250 MG
2 CAPSULE ORAL EVERY EVENING
Status: DISCONTINUED | OUTPATIENT
Start: 2024-09-22 | End: 2024-09-24 | Stop reason: HOSPADM

## 2024-09-22 RX ORDER — HYDROCORTISONE 10 MG/1
10 TABLET ORAL EVERY MORNING
Status: DISCONTINUED | OUTPATIENT
Start: 2024-09-23 | End: 2024-09-23

## 2024-09-22 RX ADMIN — RIVAROXABAN 15 MG: 15 TABLET, FILM COATED ORAL at 09:51

## 2024-09-22 RX ADMIN — LEVOTHYROXINE SODIUM 125 MCG: 0.12 TABLET ORAL at 05:37

## 2024-09-22 RX ADMIN — GABAPENTIN 100 MG: 100 CAPSULE ORAL at 22:04

## 2024-09-22 RX ADMIN — HYDROCORTISONE SODIUM SUCCINATE 50 MG: 100 INJECTION, POWDER, FOR SOLUTION INTRAMUSCULAR; INTRAVENOUS at 05:38

## 2024-09-22 RX ADMIN — OXYCODONE HYDROCHLORIDE 5 MG: 5 TABLET ORAL at 09:11

## 2024-09-22 RX ADMIN — POTASSIUM CHLORIDE 40 MEQ: 1500 TABLET, EXTENDED RELEASE ORAL at 05:38

## 2024-09-22 RX ADMIN — INSULIN HUMAN 1 UNITS: 100 INJECTION, SOLUTION PARENTERAL at 03:37

## 2024-09-22 RX ADMIN — TRAZODONE HYDROCHLORIDE 25 MG: 50 TABLET ORAL at 20:23

## 2024-09-22 RX ADMIN — LIDOCAINE 1 PATCH: 4 PATCH TOPICAL at 17:38

## 2024-09-22 RX ADMIN — GABAPENTIN 100 MG: 100 CAPSULE ORAL at 05:37

## 2024-09-22 RX ADMIN — RIVAROXABAN 15 MG: 15 TABLET, FILM COATED ORAL at 20:22

## 2024-09-22 RX ADMIN — GABAPENTIN 100 MG: 100 CAPSULE ORAL at 13:58

## 2024-09-22 RX ADMIN — ATORVASTATIN CALCIUM 10 MG: 10 TABLET, FILM COATED ORAL at 05:38

## 2024-09-22 RX ADMIN — HYDROCORTISONE 5 MG: 10 TABLET ORAL at 20:22

## 2024-09-22 ASSESSMENT — ENCOUNTER SYMPTOMS
PSYCHIATRIC NEGATIVE: 1
GASTROINTESTINAL NEGATIVE: 1
FALLS: 1
WEAKNESS: 1
EYES NEGATIVE: 1
CONSTITUTIONAL NEGATIVE: 1
CARDIOVASCULAR NEGATIVE: 1
RESPIRATORY NEGATIVE: 1

## 2024-09-22 ASSESSMENT — PAIN DESCRIPTION - PAIN TYPE
TYPE: ACUTE PAIN

## 2024-09-22 ASSESSMENT — FIBROSIS 4 INDEX: FIB4 SCORE: 2.2

## 2024-09-22 NOTE — CARE PLAN
The patient is Stable - Low risk of patient condition declining or worsening    Shift Goals  Clinical Goals: Monitor swelling right leg, monitor cardiac  Patient Goals: rest, pain control  Family Goals: JANETH    Progress made toward(s) clinical / shift goals:    Problem: Fall Risk  Goal: Patient will remain free from falls  Outcome: Progressing  Note: Patient remained free from falls.      Problem: Pain Management  Goal: Pain level will decrease to patient's comfort goal  Outcome: Progressing  Note: Pain responsive to analgesic medication and repositioning/rest.

## 2024-09-22 NOTE — PROGRESS NOTES
Tele Strip:    Rhythm: SR  Rate: 65  Ectopy: R PAC, F, 1.0 SP    OH: 0.2  QRS: 0.11  QT: 0.89

## 2024-09-22 NOTE — CARE PLAN
The patient is Stable - Low risk of patient condition declining or worsening    Shift Goals  Clinical Goals: Monitor swelling on lower extremetites, manage pain, monitor BP  Patient Goals: comfort, rest  Family Goals: isabelle    Progress made toward(s) clinical / shift goals:        Problem: Pain - Standard  Goal: Alleviation of pain or a reduction in pain to the patient’s comfort goal  Outcome: Progressing  Note: Assessed pt pain using appropriate scale. Pt stated 0/10 pain. Non-pharmacological measures applied for comfort       Problem: Knowledge Deficit - Standard  Goal: Patient and family/care givers will demonstrate understanding of plan of care, disease process/condition, diagnostic tests and medications  Outcome: Progressing  Note: Educated pt on POC. All questions and concerns answered at this moment

## 2024-09-22 NOTE — PROGRESS NOTES
Monitor room called. Pt having lengthening pauses with heart rate touching 40s. Pt asymptomatic and states no history of a-fib. STAT EKG ordered showing sinus pause. MD notified

## 2024-09-23 LAB
ALBUMIN SERPL BCP-MCNC: 3.1 G/DL (ref 3.2–4.9)
ALBUMIN/GLOB SERPL: 0.9 G/DL
ALP SERPL-CCNC: 95 U/L (ref 30–99)
ALT SERPL-CCNC: 16 U/L (ref 2–50)
ANION GAP SERPL CALC-SCNC: 7 MMOL/L (ref 7–16)
AST SERPL-CCNC: 25 U/L (ref 12–45)
BILIRUB SERPL-MCNC: 0.4 MG/DL (ref 0.1–1.5)
BUN SERPL-MCNC: 12 MG/DL (ref 8–22)
CALCIUM ALBUM COR SERPL-MCNC: 8.9 MG/DL (ref 8.5–10.5)
CALCIUM SERPL-MCNC: 8.2 MG/DL (ref 8.5–10.5)
CHLORIDE SERPL-SCNC: 103 MMOL/L (ref 96–112)
CO2 SERPL-SCNC: 29 MMOL/L (ref 20–33)
CREAT SERPL-MCNC: 0.65 MG/DL (ref 0.5–1.4)
EKG IMPRESSION: NORMAL
EKG IMPRESSION: NORMAL
ERYTHROCYTE [DISTWIDTH] IN BLOOD BY AUTOMATED COUNT: 64.7 FL (ref 35.9–50)
GFR SERPLBLD CREATININE-BSD FMLA CKD-EPI: 94 ML/MIN/1.73 M 2
GLOBULIN SER CALC-MCNC: 3.4 G/DL (ref 1.9–3.5)
GLUCOSE BLD STRIP.AUTO-MCNC: 106 MG/DL (ref 65–99)
GLUCOSE BLD STRIP.AUTO-MCNC: 111 MG/DL (ref 65–99)
GLUCOSE BLD STRIP.AUTO-MCNC: 113 MG/DL (ref 65–99)
GLUCOSE BLD STRIP.AUTO-MCNC: 134 MG/DL (ref 65–99)
GLUCOSE SERPL-MCNC: 143 MG/DL (ref 65–99)
HCT VFR BLD AUTO: 36.5 % (ref 37–47)
HGB BLD-MCNC: 11.6 G/DL (ref 12–16)
MAGNESIUM SERPL-MCNC: 2 MG/DL (ref 1.5–2.5)
MCH RBC QN AUTO: 27.8 PG (ref 27–33)
MCHC RBC AUTO-ENTMCNC: 31.8 G/DL (ref 32.2–35.5)
MCV RBC AUTO: 87.3 FL (ref 81.4–97.8)
PLATELET # BLD AUTO: 247 K/UL (ref 164–446)
PMV BLD AUTO: 10 FL (ref 9–12.9)
POTASSIUM SERPL-SCNC: 4.5 MMOL/L (ref 3.6–5.5)
PROT SERPL-MCNC: 6.5 G/DL (ref 6–8.2)
RBC # BLD AUTO: 4.18 M/UL (ref 4.2–5.4)
SODIUM SERPL-SCNC: 139 MMOL/L (ref 135–145)
WBC # BLD AUTO: 7 K/UL (ref 4.8–10.8)

## 2024-09-23 PROCEDURE — 700102 HCHG RX REV CODE 250 W/ 637 OVERRIDE(OP)

## 2024-09-23 PROCEDURE — 93005 ELECTROCARDIOGRAM TRACING: CPT

## 2024-09-23 PROCEDURE — 82962 GLUCOSE BLOOD TEST: CPT | Mod: 91

## 2024-09-23 PROCEDURE — 80053 COMPREHEN METABOLIC PANEL: CPT

## 2024-09-23 PROCEDURE — 99232 SBSQ HOSP IP/OBS MODERATE 35: CPT | Mod: GC | Performed by: INTERNAL MEDICINE

## 2024-09-23 PROCEDURE — 700102 HCHG RX REV CODE 250 W/ 637 OVERRIDE(OP): Performed by: INTERNAL MEDICINE

## 2024-09-23 PROCEDURE — A9270 NON-COVERED ITEM OR SERVICE: HCPCS

## 2024-09-23 PROCEDURE — 83735 ASSAY OF MAGNESIUM: CPT

## 2024-09-23 PROCEDURE — 93010 ELECTROCARDIOGRAM REPORT: CPT | Mod: 76 | Performed by: INTERNAL MEDICINE

## 2024-09-23 PROCEDURE — 700101 HCHG RX REV CODE 250

## 2024-09-23 PROCEDURE — 770020 HCHG ROOM/CARE - TELE (206)

## 2024-09-23 PROCEDURE — 93010 ELECTROCARDIOGRAM REPORT: CPT | Performed by: INTERNAL MEDICINE

## 2024-09-23 PROCEDURE — 85027 COMPLETE CBC AUTOMATED: CPT

## 2024-09-23 PROCEDURE — 36415 COLL VENOUS BLD VENIPUNCTURE: CPT

## 2024-09-23 PROCEDURE — A9270 NON-COVERED ITEM OR SERVICE: HCPCS | Performed by: INTERNAL MEDICINE

## 2024-09-23 RX ORDER — HYDROCORTISONE 10 MG/1
5 TABLET ORAL 2 TIMES DAILY
Status: DISCONTINUED | OUTPATIENT
Start: 2024-09-23 | End: 2024-09-24 | Stop reason: HOSPADM

## 2024-09-23 RX ADMIN — GABAPENTIN 100 MG: 100 CAPSULE ORAL at 13:33

## 2024-09-23 RX ADMIN — LEVOTHYROXINE SODIUM 125 MCG: 0.12 TABLET ORAL at 04:05

## 2024-09-23 RX ADMIN — TRAZODONE HYDROCHLORIDE 25 MG: 50 TABLET ORAL at 21:01

## 2024-09-23 RX ADMIN — ATORVASTATIN CALCIUM 10 MG: 10 TABLET, FILM COATED ORAL at 04:04

## 2024-09-23 RX ADMIN — GABAPENTIN 100 MG: 100 CAPSULE ORAL at 04:04

## 2024-09-23 RX ADMIN — ALPRAZOLAM 0.25 MG: 0.25 TABLET ORAL at 17:58

## 2024-09-23 RX ADMIN — HYDROCORTISONE 10 MG: 10 TABLET ORAL at 04:04

## 2024-09-23 RX ADMIN — LIDOCAINE 1 PATCH: 4 PATCH TOPICAL at 18:00

## 2024-09-23 RX ADMIN — GABAPENTIN 100 MG: 100 CAPSULE ORAL at 21:01

## 2024-09-23 RX ADMIN — RIVAROXABAN 15 MG: 15 TABLET, FILM COATED ORAL at 09:27

## 2024-09-23 RX ADMIN — POTASSIUM CHLORIDE 40 MEQ: 1500 TABLET, EXTENDED RELEASE ORAL at 04:05

## 2024-09-23 RX ADMIN — RIVAROXABAN 15 MG: 15 TABLET, FILM COATED ORAL at 20:29

## 2024-09-23 RX ADMIN — HYDROCORTISONE 5 MG: 10 TABLET ORAL at 17:58

## 2024-09-23 ASSESSMENT — LIFESTYLE VARIABLES
ALCOHOL_USE: NO
HAVE YOU EVER FELT YOU SHOULD CUT DOWN ON YOUR DRINKING: NO
HOW MANY TIMES IN THE PAST YEAR HAVE YOU HAD 5 OR MORE DRINKS IN A DAY: 0
TOTAL SCORE: 0
TOTAL SCORE: 0
HAVE PEOPLE ANNOYED YOU BY CRITICIZING YOUR DRINKING: NO
CONSUMPTION TOTAL: NEGATIVE
EVER FELT BAD OR GUILTY ABOUT YOUR DRINKING: NO
ON A TYPICAL DAY WHEN YOU DRINK ALCOHOL HOW MANY DRINKS DO YOU HAVE: 0
DOES PATIENT WANT TO STOP DRINKING: NO
TOTAL SCORE: 0
EVER HAD A DRINK FIRST THING IN THE MORNING TO STEADY YOUR NERVES TO GET RID OF A HANGOVER: NO
AVERAGE NUMBER OF DAYS PER WEEK YOU HAVE A DRINK CONTAINING ALCOHOL: 0

## 2024-09-23 ASSESSMENT — ENCOUNTER SYMPTOMS
WEAKNESS: 1
GASTROINTESTINAL NEGATIVE: 1
PSYCHIATRIC NEGATIVE: 1
CONSTITUTIONAL NEGATIVE: 1
EYES NEGATIVE: 1
RESPIRATORY NEGATIVE: 1
FALLS: 1
CARDIOVASCULAR NEGATIVE: 1

## 2024-09-23 ASSESSMENT — FIBROSIS 4 INDEX: FIB4 SCORE: 2.2

## 2024-09-23 ASSESSMENT — PAIN DESCRIPTION - PAIN TYPE
TYPE: CHRONIC PAIN;ACUTE PAIN
TYPE: ACUTE PAIN

## 2024-09-23 NOTE — PROGRESS NOTES
Monitor room called stating pt had frequent pauses and sustained a HR of 42. MD notified. STAT EKG ordered showing Sinus Chirag with PACs. Metoprolol held

## 2024-09-23 NOTE — PROGRESS NOTES
Tele Summary    Sinus to sinus jessie 48-78, possible runs of Afib, PACs, PVCs, prolonged IA and BBB    0.24 / 0.12 / 0.42

## 2024-09-23 NOTE — PROGRESS NOTES
Notified by monitor room that patient was bradycardic not sustaining also has small runs of afib. Touched down at 34. Patient was asymptomatic. MD Notified.

## 2024-09-23 NOTE — CARE PLAN
The patient is Stable - Low risk of patient condition declining or worsening    Shift Goals  Clinical Goals: Monitor right leg, BP and HR and rhythm  Patient Goals: rest, go home  Family Goals: isabelle    Progress made toward(s) clinical / shift goals:        Problem: Fall Risk  Goal: Patient will remain free from falls  Outcome: Progressing  Note: Assessed pt fall risk. Pt scored high. Bed locked in lowest position with bed alarm on. Pt call light and belongings within reach.      Problem: Pain Management  Goal: Pain level will decrease to patient's comfort goal  Outcome: Progressing  Note: Assessed pt pain. Pain stated having 3/10 pain in the right lower leg Reposition patient for comfort.

## 2024-09-23 NOTE — DISCHARGE PLANNING
Case Management Discharge Planning    Admission Date: 9/17/2024  GMLOS: 3.1  ALOS: 6    6-Clicks ADL Score: 22  6-Clicks Mobility Score: 19      Anticipated Discharge Dispo: Discharge Disposition: D/T to home under A care in anticipation of covered skilled care (06)  Discharge Address: [address] [address]  [place] [address]    DME Needed: No    Action(s) Taken: Chart review completed and attended IDT rounds. Pt has a care giver at home who can provide transportation and has been accepted by [facility]. Anticipate possible DC tomorrow.    Escalations Completed: None    Medically Clear: No    Next Steps: No CM needs    Barriers to Discharge: Medical clearance    Is the patient up for discharge tomorrow: Yes    Is transport arranged for discharge disposition: Yes

## 2024-09-23 NOTE — PROGRESS NOTES
[facility] Internal Medicine Daily Progress Note    Date of Service  9/23/2024    [employer] Team: R IM Green Team   Attending: Jeremy Strange M.d.  Senior Resident: Dr. Larry Castaneda  Intern:  Dr. Karen Dhillon  Contact Number: [phone]    Chief Complaint    Nieves Murphy is a 69 y.o. female admitted 9/17/2024 with Leg Swelling     Hospital Course    Nieves Murphy is a pleasant 69 y.o. female with a past medical history of diabetes, arthritis, anxiety, hypothyroidism, Atrial fibrillation and chronic left lower extremity DVT on Xarelto, who presented to the ED on 9/17/2024 with right leg swelling and pain.     According to the patient she was in her usual state of health and doing well until 4 months ago when she had a fall.  At that time she felt like her right leg bone was broken but she did not come to the hospital or get checked.  She feels like the swelling has been present since then and it has been constant.  She was okay with the swelling until a few days ago when she ran into a table and hurt her right leg.  Since then, the swelling has been worsening, and it is associated with pain.  She denies any other associated symptoms including chest pain or shortness of breath.  She has had a previous history of left lower extremity DVT, (which she is unable to recall how many years ago) but has been on Xarelto since then.  She claims that she is completely compliant with all her medications and do not miss any of her pills.  She also adds that she has been bedridden since 2010 due to worsening arthritis and uses a wheelchair to ambulate.     Her caregiver adds that for the past few weeks, when she sits in her chair her foot starts swelling. He also noticed some tender points on his on her right lower leg.    ED Management:  - Vital signs were checked which were stable.  - Patient started on Lovenox 120 mg.  - X-ray tibia/fibula done which was unremarkable.  - Her CBC was found to be normal, Chem panel showed  low potassium levels.  - Her pro BNP was found to be 719.  - CT for PE ordered that showed cardiomegaly and coronary calcification.  - Ultrasound bilateral lower extremity showed acute to subacute, partially occlusive thrombus of the right distal common femoral vein. Acute to subacute, partially occlusive thrombus in the right proximal,middle and distal femoral vein.     She was admitted on the floor for further assessment and management of right lower extremity DVT    09/18- Heparin infusion was started at 2 AM 09/18.Her TSH was high, 72.5 and her free T4 was found to be 0.66.  When inquired about her taking thyroid medications she said that she is not aware of any thyroid medications that she takes. Home dose of Levothyroxine resumed 09/18. Her HbA1c was found to be 5.3%.    Hematology consulted for possible Xeralto failure, she disclosed to  them that she has been non compliant with her medications and that might have caused the blood clots. Further work up deferred.     09/19- She had persistently low blood pressures.  Was initially given 2 boluses, but her blood pressures remained soft.Rapid response team rounded on her for low blood pressures.She was started on hydrocortisone 100 mg 3 times daily IV + Midodrine 5mg TID-PRN.  Changed from Heparin to oral therapy was deferred due to her unstable blood pressures.  She was monitored for hypotension. Hemoglobin stable. Orthostatics were negative.    09/20- Heparin drip stopped 09/20.  Patient shifted back to Xarelto.    09/21- Patient was scheduled with oxycodone for her right lower extremity pain. Hydrocortisone tapered from 100mg 3 times daily to 50mg 3 times daily.    09/22- Overnight telemetry showed  frequent pauses with sustained HR 42. Patient was asymptomatic, EKG showed PAC with prolonged MI. Hydrocortisone tapered down to 10 mg in the morning and 5 mg at night. Potassium was low, repleting as necessary.    Interval Problem Update    Overnight events:  Telemetry informed about frequent pauses and sustained a HR of 42. Patient was asymptomatic and in sleep. STAT EKG ordered showing Sinus Chirag with PACs. Metoprolol held.    She states that she feels okay. She does not have any active complaints.  - She denies any chest pain or palpitations or shortness of breath.   - Her vital signs are stable, peripheral pulses felt.  - Lower extremity swelling resolved, Pain still present, sensitive to touch. On gabapentin 100 mg TID and oxycodone as needed.  - Ambulated out of bed to the chair, no dizziness on ambulation. At baseline uses wheel chair at home.  - Will continue to monitor today on telemetry for any symptomatic bradycardia.   - Continuing on Xarelto 15 mg BID.  - Hydrocortisone tapered from oral 10mg in the morning and 5 mg at night to 5mg twice daily, as her BP has improved, currently 145/74. Will continue to monitor for now.  - Her last Potassium improved to 4.5. will continue to monitor.  - Other labs improved, WBC 7.0, Hb 11.6, Plt 247.    - Informed that patient has bradycardia with HR 34. Went to bedside, patient was sitting comfortably in the chair, denies any symptoms, completely asymptomatic. Telemetry showed frequent PAC and Afib. Ordered Stat EKG, which showed sinus rhythm with borderline UT prolongation. Will continue to monitor for now.    I have discussed this patient's plan of care and discharge plan at IDT rounds today with Case Management, Nursing, Nursing leadership, and other members of the IDT team.She has home health approval.     Consultants/Specialty  Hematology    Code Status  Full Code    Disposition  The patient is not medically cleared for discharge to home or a post-acute facility.      Review of Systems  Review of Systems   Constitutional: Negative.    HENT: Negative.     Eyes: Negative.    Respiratory: Negative.     Cardiovascular: Negative.    Gastrointestinal: Negative.    Genitourinary: Negative.    Musculoskeletal:  Positive for  falls.        Right leg pain    Skin: Negative.    Neurological:  Positive for weakness.   Endo/Heme/Allergies: Negative.    Psychiatric/Behavioral: Negative.          Physical Exam  Temp:  [35.8 °C (96.5 °F)-36.3 °C (97.3 °F)] 36.2 °C (97.2 °F)  Pulse:  [58-72] 64  Resp:  [16-18] 18  BP: (115-145)/(57-74) 127/57  SpO2:  [92 %-97 %] 96 %    Physical Exam  Constitutional:       Appearance: Normal appearance. She is obese.   HENT:      Head: Normocephalic and atraumatic.      Right Ear: Tympanic membrane, ear canal and external ear normal.      Left Ear: Tympanic membrane, ear canal and external ear normal.      Nose: Nose normal.      Mouth/Throat:      Mouth: Mucous membranes are moist.   Eyes:      Extraocular Movements: Extraocular movements intact.      Pupils: Pupils are equal, round, and reactive to light.   Cardiovascular:      Rate and Rhythm: Normal rate and regular rhythm.      Pulses: Normal pulses.      Heart sounds: Normal heart sounds.   Pulmonary:      Effort: Pulmonary effort is normal.      Breath sounds: Normal breath sounds.   Abdominal:      General: Bowel sounds are normal.      Palpations: Abdomen is soft.   Musculoskeletal:         General: Tenderness present. No swelling.      Cervical back: Normal range of motion and neck supple.      Right lower leg: No edema.      Left lower leg: No edema.   Skin:     General: Skin is warm.      Capillary Refill: Capillary refill takes less than 2 seconds.   Neurological:      General: No focal deficit present.      Mental Status: She is alert. Mental status is at baseline.   Psychiatric:         Mood and Affect: Mood normal.         Behavior: Behavior normal.         Thought Content: Thought content normal.         Judgment: Judgment normal.         Fluids    Intake/Output Summary (Last 24 hours) at 9/23/2024 1558  Last data filed at 9/23/2024 1200  Gross per 24 hour   Intake 780 ml   Output 2800 ml   Net -2020 ml       Laboratory  Recent Labs      09/23/24  0849   WBC 7.0   RBC 4.18*   HEMOGLOBIN 11.6*   HEMATOCRIT 36.5*   MCV 87.3   MCH 27.8   MCHC 31.8*   RDW 64.7*   PLATELETCT 247   MPV 10.0       Recent Labs     09/21/24  1329 09/23/24  0849   SODIUM 136 139   POTASSIUM 3.6 4.5   CHLORIDE 101 103   CO2 25 29   GLUCOSE 150* 143*   BUN 10 12   CREATININE 0.50 0.65   CALCIUM 8.4* 8.2*                       Imaging  CT-CTA CHEST PULMONARY ARTERY W/ RECONS   Final Result      1.  No evidence of pulmonary embolism.   2.  L2 vertebral body superior endplate collapse, partly in the field-of-view.   3.  Cardiomegaly.   4.  Coronary calcification.   5.  No acute infiltrates.      Fleischner Society pulmonary nodule recommendations:         US-EXTREMITY VENOUS LOWER BILAT   Final Result      DX-TIBIA AND FIBULA RIGHT   Final Result      Demineralization, postsurgical and degenerative change without acute fracture or malalignment of the tibia and fibula.           Assessment/Plan  Problem Representation:    * DVT (deep venous thrombosis) (CMS-[facility])- (present on admission)  Assessment & Plan  Patient presented with increased bilateral lower extremity swelling 09/17   - Ultrasound bilateral lower extremity 09/17- showed acute to subacute, partially occlusive thrombus of the right distal common femoral vein. Acute to subacute, partially occlusive thrombus in the right proximal,middle and distal femoral vein.  - Was previously on Xarelto.  Will hold 09/17  - Resumed Xarelto 15mg BID since 09/20.  - Started on Lovenox in the  mg.  Held on 09/17  - Heparin Infusion started 09/18. Discontinued 09/20  - CT for PE negative  - Hematology consulted for possible Xarelto failure - Ruled out 09/18. Signed off      Leg swelling- (present on admission)  Assessment & Plan  Patient denies shortness of breath, chest pain, orthopnea, dyspnea.  - No signs of heart failure currently. Recent Echo in April 24, EF 60% and elevated right sided pressures. RVSF 62 mmHg.  - Probably  secondary to DVT  - Pro .  - Daily weights.  - I/O charting  - PT/OT  - BMP if needed   - 09/23: Swellings resolved but pain present.    Hypotension  Assessment & Plan  - Resolved.   - tapering on IV Hyrdrocotisone 50mg TID. 09/21- Transitioned to Oral 10mg in the morning and 5 mg at night.   - 09/23 - tapered down to oral 5 mg twice daily. /62.  - Morning cortisol 124 on 09/20.  - Orthostatics: Negative  - On Midodrine 5mg PRN   - Hb stable  - Will monitor    Atrial fibrillation (HCC)- (present on admission)  Assessment & Plan  - Recent onset of Afib in April 2024.  - Metoprolol on hold for asymptomatic bradycardia and frequent pauses during sleep for the past two days. (09/22, 09/23).  - Will continue to monitor on telemetry      Hypothyroidism- (present on admission)  Assessment & Plan  Her TSH is 72.5 and her free T4 is 0.66.  When inquired about her taking thyroid medications she said that she is not aware of any thyroid medications that she takes.  - Patient counseled   - Home dose of Levothyroxine resumed     Mood disorder (HCC)- (present on admission)  Assessment & Plan  Will continue home medications.         VTE prophylaxis: therapeutic anticoagulation with Xarelto.    I have performed a physical exam and reviewed and updated ROS and Plan today (9/23/2024). In review of yesterday's note (9/22/2024), there are no changes except as documented above.

## 2024-09-24 ENCOUNTER — PHARMACY VISIT (OUTPATIENT)
Dept: PHARMACY | Facility: MEDICAL CENTER | Age: 69
End: 2024-09-24
Payer: COMMERCIAL

## 2024-09-24 VITALS
BODY MASS INDEX: 46.09 KG/M2 | OXYGEN SATURATION: 97 % | RESPIRATION RATE: 17 BRPM | HEART RATE: 67 BPM | HEIGHT: 63 IN | DIASTOLIC BLOOD PRESSURE: 64 MMHG | TEMPERATURE: 97.6 F | SYSTOLIC BLOOD PRESSURE: 125 MMHG | WEIGHT: 260.14 LBS

## 2024-09-24 PROBLEM — R06.83 SNORES: Status: ACTIVE | Noted: 2024-09-24

## 2024-09-24 PROBLEM — I48.0 PAROXYSMAL ATRIAL FIBRILLATION WITH CONVERSION PAUSES (HCC): Status: ACTIVE | Noted: 2024-04-07

## 2024-09-24 PROBLEM — I95.9 HYPOTENSION: Status: RESOLVED | Noted: 2024-09-19 | Resolved: 2024-09-24

## 2024-09-24 PROBLEM — I49.5 PAROXYSMAL ATRIAL FIBRILLATION WITH CONVERSION PAUSES (HCC): Status: ACTIVE | Noted: 2024-04-07

## 2024-09-24 LAB
ANION GAP SERPL CALC-SCNC: 13 MMOL/L (ref 7–16)
BUN SERPL-MCNC: 13 MG/DL (ref 8–22)
CALCIUM SERPL-MCNC: 8.3 MG/DL (ref 8.5–10.5)
CHLORIDE SERPL-SCNC: 103 MMOL/L (ref 96–112)
CO2 SERPL-SCNC: 22 MMOL/L (ref 20–33)
CREAT SERPL-MCNC: 0.59 MG/DL (ref 0.5–1.4)
EKG IMPRESSION: NORMAL
GFR SERPLBLD CREATININE-BSD FMLA CKD-EPI: 96 ML/MIN/1.73 M 2
GLUCOSE BLD STRIP.AUTO-MCNC: 110 MG/DL (ref 65–99)
GLUCOSE BLD STRIP.AUTO-MCNC: 117 MG/DL (ref 65–99)
GLUCOSE BLD STRIP.AUTO-MCNC: 124 MG/DL (ref 65–99)
GLUCOSE SERPL-MCNC: 120 MG/DL (ref 65–99)
MAGNESIUM SERPL-MCNC: 2 MG/DL (ref 1.5–2.5)
PHOSPHATE SERPL-MCNC: 3.8 MG/DL (ref 2.5–4.5)
POTASSIUM SERPL-SCNC: 3.7 MMOL/L (ref 3.6–5.5)
SODIUM SERPL-SCNC: 138 MMOL/L (ref 135–145)

## 2024-09-24 PROCEDURE — 83735 ASSAY OF MAGNESIUM: CPT

## 2024-09-24 PROCEDURE — 700102 HCHG RX REV CODE 250 W/ 637 OVERRIDE(OP)

## 2024-09-24 PROCEDURE — 700101 HCHG RX REV CODE 250

## 2024-09-24 PROCEDURE — 90662 IIV NO PRSV INCREASED AG IM: CPT

## 2024-09-24 PROCEDURE — 84100 ASSAY OF PHOSPHORUS: CPT

## 2024-09-24 PROCEDURE — 93005 ELECTROCARDIOGRAM TRACING: CPT

## 2024-09-24 PROCEDURE — 82962 GLUCOSE BLOOD TEST: CPT

## 2024-09-24 PROCEDURE — 99239 HOSP IP/OBS DSCHRG MGMT >30: CPT | Mod: GC | Performed by: INTERNAL MEDICINE

## 2024-09-24 PROCEDURE — 90471 IMMUNIZATION ADMIN: CPT

## 2024-09-24 PROCEDURE — 80048 BASIC METABOLIC PNL TOTAL CA: CPT

## 2024-09-24 PROCEDURE — A9270 NON-COVERED ITEM OR SERVICE: HCPCS

## 2024-09-24 PROCEDURE — 700102 HCHG RX REV CODE 250 W/ 637 OVERRIDE(OP): Performed by: INTERNAL MEDICINE

## 2024-09-24 PROCEDURE — 36415 COLL VENOUS BLD VENIPUNCTURE: CPT

## 2024-09-24 PROCEDURE — RXMED WILLOW AMBULATORY MEDICATION CHARGE

## 2024-09-24 PROCEDURE — 99222 1ST HOSP IP/OBS MODERATE 55: CPT | Performed by: INTERNAL MEDICINE

## 2024-09-24 PROCEDURE — 93010 ELECTROCARDIOGRAM REPORT: CPT | Performed by: INTERNAL MEDICINE

## 2024-09-24 PROCEDURE — 700111 HCHG RX REV CODE 636 W/ 250 OVERRIDE (IP)

## 2024-09-24 PROCEDURE — A9270 NON-COVERED ITEM OR SERVICE: HCPCS | Performed by: INTERNAL MEDICINE

## 2024-09-24 RX ORDER — TRAZODONE HYDROCHLORIDE 50 MG/1
100 TABLET, FILM COATED ORAL NIGHTLY
Qty: 30 TABLET | Refills: 0 | Status: SHIPPED | OUTPATIENT
Start: 2024-09-24

## 2024-09-24 RX ORDER — HYDROCORTISONE 5 MG/1
5 TABLET ORAL 2 TIMES DAILY
Qty: 30 TABLET | Refills: 0 | Status: SHIPPED | OUTPATIENT
Start: 2024-09-24

## 2024-09-24 RX ORDER — OXYCODONE HYDROCHLORIDE 5 MG/1
5 TABLET ORAL EVERY 4 HOURS PRN
Qty: 30 TABLET | Refills: 0 | Status: SHIPPED | OUTPATIENT
Start: 2024-09-24 | End: 2024-09-29

## 2024-09-24 RX ORDER — SEMAGLUTIDE 0.68 MG/ML
0.25 INJECTION, SOLUTION SUBCUTANEOUS
Qty: 3 ML | Refills: 0 | Status: SHIPPED | OUTPATIENT
Start: 2024-09-24

## 2024-09-24 RX ORDER — HYDROCORTISONE 5 MG/1
5 TABLET ORAL 2 TIMES DAILY
Qty: 30 TABLET | Refills: 0 | Status: SHIPPED | OUTPATIENT
Start: 2024-09-24 | End: 2024-09-24

## 2024-09-24 RX ORDER — ALBUTEROL SULFATE 90 UG/1
2 INHALANT RESPIRATORY (INHALATION) EVERY 6 HOURS PRN
Qty: 8.5 G | Refills: 0 | Status: SHIPPED | OUTPATIENT
Start: 2024-09-24

## 2024-09-24 RX ORDER — PANTOPRAZOLE SODIUM 20 MG/1
20 TABLET, DELAYED RELEASE ORAL DAILY
Qty: 30 TABLET | Refills: 0 | Status: SHIPPED | OUTPATIENT
Start: 2024-09-24

## 2024-09-24 RX ORDER — ATORVASTATIN CALCIUM 10 MG/1
10 TABLET, FILM COATED ORAL DAILY
Qty: 30 TABLET | Refills: 0 | Status: SHIPPED | OUTPATIENT
Start: 2024-09-24

## 2024-09-24 RX ORDER — GABAPENTIN 400 MG/1
400 CAPSULE ORAL 3 TIMES DAILY
Qty: 90 CAPSULE | Refills: 0 | Status: SHIPPED | OUTPATIENT
Start: 2024-09-24

## 2024-09-24 RX ORDER — ACETAMINOPHEN 500 MG
1000 TABLET ORAL EVERY 6 HOURS PRN
Qty: 30 TABLET | Refills: 0 | Status: SHIPPED | OUTPATIENT
Start: 2024-09-24

## 2024-09-24 RX ORDER — ACETAMINOPHEN 500 MG
1000 TABLET ORAL EVERY 6 HOURS PRN
Qty: 30 TABLET | Refills: 0 | Status: SHIPPED | OUTPATIENT
Start: 2024-09-24 | End: 2024-09-24

## 2024-09-24 RX ORDER — OXYCODONE HYDROCHLORIDE 5 MG/1
5 TABLET ORAL EVERY 4 HOURS PRN
Qty: 30 TABLET | Refills: 0 | Status: SHIPPED | OUTPATIENT
Start: 2024-09-24 | End: 2024-09-24

## 2024-09-24 RX ORDER — RIVAROXABAN 15 MG-20MG
KIT ORAL
Qty: 51 EACH | Refills: 0 | Status: ACTIVE | OUTPATIENT
Start: 2024-09-24 | End: 2024-11-09

## 2024-09-24 RX ADMIN — HYDROCORTISONE 5 MG: 10 TABLET ORAL at 17:14

## 2024-09-24 RX ADMIN — RIVAROXABAN 15 MG: 15 TABLET, FILM COATED ORAL at 17:14

## 2024-09-24 RX ADMIN — LIDOCAINE 1 PATCH: 4 PATCH TOPICAL at 17:16

## 2024-09-24 RX ADMIN — ATORVASTATIN CALCIUM 10 MG: 10 TABLET, FILM COATED ORAL at 05:01

## 2024-09-24 RX ADMIN — HYDROCORTISONE 5 MG: 10 TABLET ORAL at 05:01

## 2024-09-24 RX ADMIN — GABAPENTIN 100 MG: 100 CAPSULE ORAL at 14:37

## 2024-09-24 RX ADMIN — GABAPENTIN 100 MG: 100 CAPSULE ORAL at 05:01

## 2024-09-24 RX ADMIN — INFLUENZA A VIRUS A/VICTORIA/4897/2022 IVR-238 (H1N1) ANTIGEN (FORMALDEHYDE INACTIVATED), INFLUENZA A VIRUS A/CALIFORNIA/122/2022 SAN-022 (H3N2) ANTIGEN (FORMALDEHYDE INACTIVATED), AND INFLUENZA B VIRUS B/MICHIGAN/01/2021 ANTIGEN (FORMALDEHYDE INACTIVATED) 0.5 ML: 60; 60; 60 INJECTION, SUSPENSION INTRAMUSCULAR at 16:08

## 2024-09-24 RX ADMIN — RIVAROXABAN 15 MG: 15 TABLET, FILM COATED ORAL at 07:35

## 2024-09-24 RX ADMIN — LEVOTHYROXINE SODIUM 125 MCG: 0.12 TABLET ORAL at 05:01

## 2024-09-24 ASSESSMENT — ENCOUNTER SYMPTOMS
HEADACHES: 0
TINGLING: 0
DOUBLE VISION: 0
TREMORS: 0
PALPITATIONS: 0
BLURRED VISION: 0
VOMITING: 0
DIZZINESS: 0
ORTHOPNEA: 0
COUGH: 0
HEARTBURN: 0
SPUTUM PRODUCTION: 0
CHILLS: 0
HEMOPTYSIS: 0
NAUSEA: 0
BRUISES/BLEEDS EASILY: 0
FEVER: 0
NECK PAIN: 0
MYALGIAS: 0

## 2024-09-24 ASSESSMENT — PAIN DESCRIPTION - PAIN TYPE: TYPE: ACUTE PAIN

## 2024-09-24 ASSESSMENT — FIBROSIS 4 INDEX: FIB4 SCORE: 1.77

## 2024-09-24 NOTE — DISCHARGE SUMMARY
[facility] Internal Medicine Discharge Summary    Attending: Aroldo Ovalle M.d.  Senior Resident: Dr. Juarez  Intern:  Dr. Dhillon   Contact Number: [phone]    CHIEF COMPLAINT ON ADMISSION  Chief Complaint   Patient presents with    Leg Swelling     Patient complains of bilateral leg swelling x 4 months with right > left. Patient reports having a fall 4 months ago.        Reason for Admission  Leg  Pain, Leg Swelling     Admission Date  9/17/2024    CODE STATUS  Full Code    HPI & HOSPITAL COURSE  This is a 69 y.o. female here with bilateral DVT, patient initially mentioned that she was compliant with Xarelto but then after having referral and consultation with hematologist she mentioned that she missed a week of Xarelto due to delay on pharmacy prescription, patient was initially started on heparin drip but then transition back to Xarelto currently on therapeutic dose will continue for 3 months and will follow-up with PCP patient also have A-fib, reason why she will continue with Xarelto as anticoagulant medication meanwhile we will hold beta-blockers because patient has been having pauses cardiology evaluated the patient and mention she will require a sleep medicine evaluation for sleep apnea.  Patient referring pain over right lower extremity possibly secondary to DVT has been prescribed with Oxycodone for a few days.    Patient has been discharged stable will follow-up with PCP currently on Xarelto therapeutic dose.  Please follow-up with pulmonology sleep medicine for sleep study      Therefore, she is discharged in good and stable condition to home with close outpatient follow-up.    The patient met 2-midnight criteria for an inpatient stay at the time of discharge.    Discharge Date  9/24/24    Physical Exam on Day of Discharge  Physical Exam  Constitutional:       General: She is not in acute distress.     Appearance: She is not ill-appearing.   Eyes:      General:         Right eye: No discharge.         Left  eye: No discharge.   Cardiovascular:      Rate and Rhythm: Normal rate.      Heart sounds: No murmur heard.     No gallop.   Pulmonary:      Effort: No respiratory distress.      Breath sounds: No stridor. No wheezing.   Abdominal:      General: There is no distension.      Palpations: There is no mass.      Tenderness: There is no abdominal tenderness.   Musculoskeletal:         General: Swelling and tenderness (Right lower extremity) present.      Cervical back: No rigidity.      Right lower leg: Edema present.      Left lower leg: Edema present.   Lymphadenopathy:      Cervical: No cervical adenopathy.   Skin:     Coloration: Skin is not jaundiced or pale.   Neurological:      General: No focal deficit present.      Mental Status: She is oriented to person, place, and time.         FOLLOW UP ITEMS POST DISCHARGE  -Follow-up with PCP, patient on Xarelto    DISCHARGE DIAGNOSES  Principal Problem:    DVT (deep venous thrombosis) (CMS-HCC) (POA: Yes)  Active Problems:    Paroxysmal atrial fibrillation with conversion pauses (HCC) (POA: Yes)    Mood disorder (HCC) (POA: Yes)    Leg swelling (POA: Yes)    Hypothyroidism (POA: Yes)    Snores (POA: Unknown)  Resolved Problems:    Hypotension (POA: Unknown)      FOLLOW UP  No future appointments.  [address]  [facility] [address]  [phone]        Maribel Salazar M.D.  [address] [facility] [address]  [phone]    Follow up        MEDICATIONS ON DISCHARGE     Medication List        START taking these medications        Instructions   acetaminophen 500 MG Tabs  Commonly known as: Tylenol   Take 2 Tablets by mouth every 6 hours as needed for Moderate Pain.  Dose: 1,000 mg     hydrocortisone 5 MG Tabs  Commonly known as: Cortef   Take 1 Tablet by mouth 2 times a day.  Dose: 5 mg     oxyCODONE immediate-release 5 MG Tabs  Commonly known as: Roxicodone   Take 1 Tablet by mouth every four hours as needed for Severe  Pain for up to 5 days.  Dose: 5 mg     Xarelto Starter Pack 15 & 20 MG  Generic drug: Rivaroxaban Starter Pack  Replaces: rivaroxaban 20 MG Tabs tablet   Take one (15 mg) tablet twice daily for 16 days, then take one (20 mg) tablet once daily.            CONTINUE taking these medications        Instructions   albuterol 108 (90 Base) MCG/ACT Aers inhalation aerosol   Inhale 2 Puffs every 6 hours as needed for Shortness of Breath.  Dose: 2 Puff     atorvastatin 10 MG Tabs  Commonly known as: Lipitor   Take 1 Tablet by mouth every day.  Dose: 10 mg     gabapentin 400 MG Caps  Commonly known as: Neurontin   Take 1 Capsule by mouth 3 times a day.  Dose: 400 mg     levothyroxine 125 MCG Tabs  Commonly known as: Synthroid   Take 125 mcg by mouth every morning on an empty stomach.  Dose: 125 mcg     Ozempic (0.25 or 0.5 MG/DOSE) 2 MG/1.5ML Sopn  Generic drug: Semaglutide(0.25 or 0.5MG/DOS)   Inject 0.25 mg under the skin every 7 days.  Dose: 0.25 mg     pantoprazole 20 MG tablet  Commonly known as: Protonix   Take 1 Tablet by mouth every day.  Dose: 20 mg     traZODone 50 MG Tabs  Commonly known as: Desyrel   Take 2 Tablets by mouth every evening. May take addition 2 tablets PRN    100 mg = 2 tabs  Dose: 100 mg            STOP taking these medications      metoprolol SR 25 MG Tb24  Commonly known as: Toprol XL     rivaroxaban 20 MG Tabs tablet  Commonly known as: Xarelto  Replaced by: Xarelto Starter Pack 15 & 20 MG              Allergies  Allergies   Allergen Reactions    Asa [Aspirin] Unspecified     GI DISCOMFORT    Pcn [Penicillins] Unspecified     GI DISCOMFORT       DIET  Orders Placed This Encounter   Procedures    Diet Order Diet: Consistent CHO (Diabetic); Second Modifier: (optional): Cardiac     Standing Status:   Standing     Number of Occurrences:   1     Order Specific Question:   Diet:     Answer:   Consistent CHO (Diabetic) [4]     Order Specific Question:   Second Modifier: (optional)     Answer:   Cardiac  [6]       ACTIVITY  As tolerated.  Weight bearing as tolerated    CONSULTATIONS  Hematology  Cardiology    PROCEDURES  None    LABORATORY  Lab Results   Component Value Date    SODIUM 138 09/24/2024    POTASSIUM 3.7 09/24/2024    CHLORIDE 103 09/24/2024    CO2 22 09/24/2024    GLUCOSE 120 (H) 09/24/2024    BUN 13 09/24/2024    CREATININE 0.59 09/24/2024        Lab Results   Component Value Date    WBC 7.0 09/23/2024    HEMOGLOBIN 11.6 (L) 09/23/2024    HEMATOCRIT 36.5 (L) 09/23/2024    PLATELETCT 247 09/23/2024        Total time of the discharge process exceeds 45  minutes.

## 2024-09-24 NOTE — CONSULTS
Reason for Consult:  Asked by Dr Aroldo Ovalle M.D. to see this patient with  [  bradycardia, pauses ]  Patient's PCP: Maribel Salazar M.D.    CC:   Chief Complaint   Patient presents with    Leg Swelling     Patient complains of bilateral leg swelling x 4 months with right > left. Patient reports having a fall 4 months ago.        HPI:  [69-year-old female patient with history of diabetes mellitus, paroxysmal atrial fibrillation, DVT presented with right leg swelling and pain, found to have deep venous thrombosis.  Her pain in her lower extremity is improving.  She denies chest pain, shortness of breath, no lightheadedness, no syncope.  She was noted to have bradycardia, 2-second pauses at night while sleeping so cardiology consult was requested]    Medications / Drug list prior to admission:  No current facility-administered medications on file prior to encounter.     Current Outpatient Medications on File Prior to Encounter   Medication Sig Dispense Refill    gabapentin (NEURONTIN) 400 MG Cap Take 400 mg by mouth 3 times a day.      traZODone (DESYREL) 50 MG Tab Take 100 mg by mouth every evening. May take addition 2 tablets PRN    100 mg = 2 tabs      atorvastatin (LIPITOR) 10 MG Tab Take 10 mg by mouth every day.      albuterol 108 (90 Base) MCG/ACT Aero Soln inhalation aerosol Inhale 2 Puffs every 6 hours as needed for Shortness of Breath.      levothyroxine (SYNTHROID) 125 MCG Tab Take 125 mcg by mouth every morning on an empty stomach.      metoprolol SR (TOPROL XL) 25 MG TABLET SR 24 HR Take 1 Tablet by mouth every day. 30 Tablet 1    pantoprazole (PROTONIX) 20 MG tablet Take 20 mg by mouth every day.      rivaroxaban (XARELTO) 20 MG Tab tablet Take 1 Tab by mouth with dinner. 90 Tab 1    Semaglutide,0.25 or 0.5MG/DOS, (OZEMPIC, 0.25 OR 0.5 MG/DOSE,) 2 MG/1.5ML Solution Pen-injector Inject 0.25 mg under the skin every 7 days.         Current list of administered Medications:    Current Facility-Administered  Medications:     influenza vaccine High-Dose (Fluzone) injection 0.5 mL, 0.5 mL, Intramuscular, Once, Leonel Juarez M.D.    hydrocortisone (Cortef) tablet 5 mg, 5 mg, Oral, BID, Jeremy Strange M.D., 5 mg at 09/24/24 0501    senna-docusate (Pericolace Or Senokot S) 8.6-50 MG per tablet 2 Tablet, 2 Tablet, Oral, Q EVENING **AND** polyethylene glycol/lytes (Miralax) Packet 1 Packet, 1 Packet, Oral, QDAY PRN, Christy Guadalupe M.D.    oxyCODONE immediate-release (Roxicodone) tablet 5 mg, 5 mg, Oral, Q4HRS PRN, Leonel Juarez M.D., 5 mg at 09/22/24 0911    rivaroxaban (Xarelto) tablet 15 mg, 15 mg, Oral, BID WITH MEALS, 15 mg at 09/24/24 0735 **FOLLOWED BY** [START ON 10/11/2024] rivaroxaban (Xarelto) tablet 20 mg, 20 mg, Oral, PM MEAL, Christy Guadalupe M.D.    acetaminophen (Tylenol) tablet 1,000 mg, 1,000 mg, Oral, Q6HRS PRN, Leonel Juarez M.D.    lidocaine (Asperflex) 4 % patch 1 Patch, 1 Patch, Transdermal, Q EVENING, Leonel Juarez M.D., 1 Patch at 09/23/24 1800    gabapentin (Neurontin) capsule 100 mg, 100 mg, Oral, Q8HRS, Jeremy Strange M.D., 100 mg at 09/24/24 1437    traZODone (Desyrel) tablet 25 mg, 25 mg, Oral, Nightly, Jeremy Strange M.D., 25 mg at 09/23/24 2101    ondansetron (Zofran) syringe/vial injection 4 mg, 4 mg, Intravenous, Q4HRS PRN, Christy Guadalupe M.D.    ondansetron (Zofran ODT) dispertab 4 mg, 4 mg, Oral, Q4HRS PRN, Christy Guadalupe M.D., 4 mg at 09/19/24 0446    guaiFENesin dextromethorphan (Robitussin DM) 100-10 MG/5ML syrup 10 mL, 10 mL, Oral, Q6HRS PRN, Christy Guadalupe M.D.    insulin regular (HumuLIN R,NovoLIN R) injection, 1-6 Units, Subcutaneous, Q6HRS, 1 Units at 09/22/24 0337 **AND** POC blood glucose manual result, , , Q6H **AND** NOTIFY MD and PharmD, , , Once **AND** Administer 20 grams of glucose (approximately 8 ounces of fruit juice) every 15 minutes PRN FSBG less than 70 mg/dL, , , PRN **AND** dextrose 50% (D50W) injection 25 g, 25 g, Intravenous, Q15 MIN PRN, Christy Guadalupe M.D.    albuterol inhaler  2 Puff, 2 Puff, Inhalation, Q6HRS PRN, Christy Guadalupe M.D.    atorvastatin (Lipitor) tablet 10 mg, 10 mg, Oral, DAILY, Christy Guadalupe M.D., 10 mg at 09/24/24 0501    levothyroxine (Synthroid) tablet 125 mcg, 125 mcg, Oral, AM ES, Christy Guadalupe M.D., 125 mcg at 09/24/24 0501    [Held by provider] metoprolol SR (Toprol XL) tablet 25 mg, 25 mg, Oral, DAILY, Leonel Juarez M.D.    [Held by provider] omeprazole (PriLOSEC) capsule 20 mg, 20 mg, Oral, DAILY, Christy Guadalupe M.D., 20 mg at 09/19/24 0438    ALPRAZolam (Xanax) tablet 0.25 mg, 0.25 mg, Oral, HS PRN, Jeremy Strange M.D., 0.25 mg at 09/23/24 1758    Past Medical History:   Diagnosis Date    Anxiety     Arthritis     osteo    Dental disorder 2016    upper denture    DJD (degenerative joint disease)     DVT (deep venous thrombosis) ([facility]) 2-2016    leg right    Gynecological disorder     Heart burn 2016    pain legs and hands; arthritis    Indigestion     Obesity     Restless leg syndrome        Past Surgical History:   Procedure Laterality Date    ANTERIOR AND POSTERIOR REPAIR  7/18/2017    Procedure: ANTERIOR AND POSTERIOR REPAIR ;  Surgeon: Dave Ellis M.D.;  Location: SURGERY SAME DAY [facility];  Service:     ENTEROCELE REPAIR  7/18/2017    Procedure: ENTEROCELE REPAIR , PERINEOPLASTY ;  Surgeon: Dave Ellis M.D.;  Location: SURGERY SAME DAY [facility];  Service:     VAGINAL SUSPENSION  7/18/2017    Procedure: VAGINAL SUSPENSION- SACROSPINOUS VAULT ;  Surgeon: Dave Ellis M.D.;  Location: SURGERY SAME DAY [facility];  Service:     BLADDER SLING FEMALE  7/18/2017    Procedure: BLADDER SLING FEMALE TOT;  Surgeon: Dave Ellis M.D.;  Location: SURGERY SAME DAY [facility];  Service:     VAGINAL HYSTERECTOMY SCOPE TOTAL  1/3/2017    Procedure: VAGINAL HYSTERECTOMY SCOPE TOTAL WITH BSO;  Surgeon: Dave Ellis M.D.;  Location: SURGERY SAME DAY [facility];  Service:     ANTERIOR AND POSTERIOR REPAIR  1/3/2017    Procedure: ANTERIOR AND POSTERIOR  REPAIR;  Surgeon: Dave Ellis M.D.;  Location: SURGERY SAME DAY [facility];  Service:     ENTEROCELE REPAIR  1/3/2017    Procedure: ENTEROCELE REPAIR;  Surgeon: Dave Ellis M.D.;  Location: SURGERY SAME DAY [facility];  Service:     VAGINAL SUSPENSION  1/3/2017    Procedure: VAGINAL SUSPENSION - SACROSPINOUS VAULT W/PERINEOPLASTY;  Surgeon: Dave Ellis M.D.;  Location: SURGERY SAME DAY [facility];  Service:     BLADDER SLING FEMALE  1/3/2017    Procedure: BLADDER SLING FEMALE - TOT;  Surgeon: Dave Ellis M.D.;  Location: SURGERY SAME DAY [facility];  Service:     CYSTOSCOPY  1/3/2017    Procedure: CYSTOSCOPY;  Surgeon: Dave Ellis M.D.;  Location: SURGERY SAME DAY [facility];  Service:     OTHER ABDOMINAL SURGERY      gallbladder surgery at age 20 yr       History reviewed. No pertinent family history.  Patient family history was personally reviewed, no pertinent family history to current presentation    Social History     Tobacco Use    Smoking status: Former     Current packs/day: 0.00     Types: Cigarettes     Quit date: 1973     Years since quittin.7   Vaping Use    Vaping status: Never Used   Substance Use Topics    Alcohol use: No     Alcohol/week: 0.0 oz    Drug use: Yes     Types: Inhaled     Comment: marijuana occasionally       ALLERGIES:  Allergies   Allergen Reactions    Asa [Aspirin] Unspecified     GI DISCOMFORT    Pcn [Penicillins] Unspecified     GI DISCOMFORT       Review of systems:  A complete review of symptoms was reviewed with patient. This is reviewed in H&P and PMH. ALL OTHERS reviewed and negative    Physical exam:  Patient Vitals for the past 24 hrs:   BP Temp Temp src Pulse Resp SpO2 Weight   24 1238 125/64 36.4 °C (97.6 °F) Temporal 67 17 97 % --   24 0815 102/58 36.2 °C (97.2 °F) Temporal 65 17 100 % --   24 0500 -- -- -- -- -- -- 118 kg (260 lb 2.3 oz)   24 0409 100/55 36 °C (96.8 °F) Temporal (!) 52 18 95 % --    24 2327 92/50 35.9 °C (96.6 °F) Temporal 65 18 96 % --   24 1950 117/64 36.2 °C (97.2 °F) Temporal (!) 59 18 97 % --   24 1539 127/57 36.2 °C (97.2 °F) Temporal 64 18 96 % --     General: No acute distress.   EYES: no jaundice  HEENT: OP clear   Neck:  No JVD.   CVS:  [   ] RRR. S1 + S2. No M/R/G  Resp: Normal respiratory effort, CTAB. No wheezing or crackles/rhonchi.  Abdomen: Soft, ND,  Skin: Grossly nothing acute no obvious rashes  Neurological: Alert, Moves all extremities  Extremities:   [  1+ ] edema. No cyanosis.       Data:  Laboratory studies personally reviewed by me:  Recent Results (from the past 24 hour(s))   EKG    Collection Time: 24  4:56 PM   Result Value Ref Range    Report       [facility]    Test Date:  2024  Pt Name:    CONRAD PABON                 Department: [facility]  MRN:        [id number]                      Room:       [facility]  Gender:     Female                       Technician: RAUL  :        1955                   Requested By:ROSIO VALDIVIA  Order #:    [id number]                    Reading MD: Chaim Orlando MD    Measurements  Intervals                                Axis  Rate:       81                           P:          2  MN:         218                          QRS:        -51  QRSD:       106                          T:          120  QT:         416  QTc:        483    Interpretive Statements  Sinus rhythm  Borderline prolonged MN interval  Inferior infarct, old  Anterior infarct, old  Lateral leads are also involved  Artifact in lead(s) I,II,III,aVR,aVL,aVF,V1,V2,V3,V4,V6  Compared to ECG 2024 01:36:40  Sinus bradycardia no longer present  Atrial premature complex(es) no longer present  Myocardial infarct finding still pr esent  Electronically Signed On 2024 17:00:04 PDT by Chaim Orlando MD     POCT glucose device results    Collection Time: 24  5:49 PM   Result Value Ref Range    POC Glucose, Blood 106 (H) 65 - 99 mg/dL    POCT glucose device results    Collection Time: 24 11:43 PM   Result Value Ref Range    POC Glucose, Blood 113 (H) 65 - 99 mg/dL   Basic Metabolic Panel    Collection Time: 24  2:40 AM   Result Value Ref Range    Sodium 138 135 - 145 mmol/L    Potassium 3.7 3.6 - 5.5 mmol/L    Chloride 103 96 - 112 mmol/L    Co2 22 20 - 33 mmol/L    Glucose 120 (H) 65 - 99 mg/dL    Bun 13 8 - 22 mg/dL    Creatinine 0.59 0.50 - 1.40 mg/dL    Calcium 8.3 (L) 8.5 - 10.5 mg/dL    Anion Gap 13.0 7.0 - 16.0   MAGNESIUM    Collection Time: 24  2:40 AM   Result Value Ref Range    Magnesium 2.0 1.5 - 2.5 mg/dL   PHOSPHORUS    Collection Time: 24  2:40 AM   Result Value Ref Range    Phosphorus 3.8 2.5 - 4.5 mg/dL   ESTIMATED GFR    Collection Time: 24  2:40 AM   Result Value Ref Range    GFR (CKD-EPI) 96 >60 mL/min/1.73 m 2   POCT glucose device results    Collection Time: 24  5:46 AM   Result Value Ref Range    POC Glucose, Blood 117 (H) 65 - 99 mg/dL   EKG    Collection Time: 24 11:25 AM   Result Value Ref Range    Report       [facility]    Test Date:  2024  Pt Name:    CONRAD PABON                 Department: [facility]  MRN:        [id number]                      Room:       [facility]  Gender:     Female                       Technician: [facility]  :        1955                   Requested By:DOLORES DE JESUS  Order #:    [id number]                    Clair MD: Yung Lam MD    Measurements  Intervals                                Axis  Rate:       59                           P:          84  NJ:         215                          QRS:        -58  QRSD:       98                           T:          0  QT:         455  QTc:        451    Interpretive Statements  Sinus bradycardia  Borderline prolonged NJ interval  Anterolateral infarct, age indeterminate  Artifact in lead(s) II,III,aVR,aVL,aVF,V1,V2,V3,V4,V5,V6  Compared to ECG 2024 16:56:55  Sinus rhythm no longer  present  Myocardial infarct finding still present  Electronically Signed On 09- 11:36:19 PDT by Yung Lam MD     POCT glucose device results    Collection Time: 09/24/24 12:19 PM   Result Value Ref Range    POC Glucose, Blood 110 (H) 65 - 99 mg/dL       Imaging:  CT-CTA CHEST PULMONARY ARTERY W/ RECONS   Final Result      1.  No evidence of pulmonary embolism.   2.  L2 vertebral body superior endplate collapse, partly in the field-of-view.   3.  Cardiomegaly.   4.  Coronary calcification.   5.  No acute infiltrates.      Fleischner Society pulmonary nodule recommendations:         US-EXTREMITY VENOUS LOWER BILAT   Final Result      DX-TIBIA AND FIBULA RIGHT   Final Result      Demineralization, postsurgical and degenerative change without acute fracture or malalignment of the tibia and fibula.              EKG tracings personally reviewed by me  [sinus bradycardia PVCs]    Echocardiogram images personally reviewed by me show [4/8/2024]  CONCLUSIONS  Mild concentric left ventricular hypertrophy. Normal left ventricular   size and systolic function. Grade II diastolic dysfunction.  Mildly dilated right ventricle with preserved systolic function.  Severely dilated left atrium.  Mild mitral regurgitation.  Estimated right ventricular systolic pressure is 62 mmHg; severely   elevated.  Right atrial pressure is estimated to be 15 mmHg; elevated.  Normal pericardium without effusion.    All pertinent features of laboratory and imaging reviewed including primary images where applicable      Principal Problem:    DVT (deep venous thrombosis) (CMS-HCC) (POA: Yes)  Active Problems:    Paroxysmal atrial fibrillation with conversion pauses (HCC) (POA: Yes)    Mood disorder (HCC) (POA: Yes)    Leg swelling (POA: Yes)    Hypothyroidism (POA: Yes)    Hypotension (POA: Unknown)  Resolved Problems:    * No resolved hospital problems. *      Assessment / Plan:  69-year-old female patient presented with leg swelling,  DVT has sinus bradycardia, pauses predominantly at night.  EKG reviewed, appears to have blocked PACs.  Nighttime bradycardia, pauses likely due to sleep apnea.  Okay to stop metoprolol.  No need for pacemaker at this time.  Recommend outpatient evaluation, treatment for sleep apnea  Continue anticoagulation.    Cardiology will sign off, please call us back if needed.      I personally discussed her case with  Dr Aroldo Ovalle M.D.    No future appointments.    It is my pleasure to participate in the care of Ms. Murphy.  Please do not hesitate to contact me with questions or concerns.    Enrique Paz M.D.    9/24/2024

## 2024-09-24 NOTE — PROGRESS NOTES
[facility] Internal Medicine Daily Progress Note    Date of Service  9/24/2024    [facility] Team: R IM Green Team   Attending: Aroldo Ovalle M.d.  Senior Resident: Dr. Juarez  Intern:  Dr. Dhillon   Contact Number: [phone]    Chief Complaint  Nieves Murphy is a 69 y.o. female admitted 9/17/2024 with Bilateral DVT    Hospital Course  Nieves Murphy is a pleasant 69 y.o. female with a past medical history of diabetes, arthritis, anxiety, hypothyroidism, Atrial fibrillation and chronic left lower extremity DVT on Xarelto, who presented to the ED on 9/17/2024 with right leg swelling and pain.     According to the patient she was in her usual state of health and doing well until 4 months ago when she had a fall.  At that time she felt like her right leg bone was broken but she did not come to the hospital or get checked.  She feels like the swelling has been present since then and it has been constant.  She was okay with the swelling until a few days ago when she ran into a table and hurt her right leg.  Since then, the swelling has been worsening, and it is associated with pain.  She denies any other associated symptoms including chest pain or shortness of breath.  She has had a previous history of left lower extremity DVT, (which she is unable to recall how many years ago) but has been on Xarelto since then.  She claims that she is completely compliant with all her medications and do not miss any of her pills.  She also adds that she has been bedridden since 2010 due to worsening arthritis and uses a wheelchair to ambulate.     Her caregiver adds that for the past few weeks, when she sits in her chair her foot starts swelling. He also noticed some tender points on his on her right lower leg.    09/18- Heparin infusion was started at 2 AM 09/18.Her TSH was high, 72.5 and her free T4 was found to be 0.66.  When inquired about her taking thyroid medications she said that she is not aware of any thyroid medications that  she takes. Home dose of Levothyroxine resumed 09/18. Her HbA1c was found to be 5.3%.     Hematology consulted for possible Xeralto failure, she disclosed to  them that she has been non compliant with her medications and that might have caused the blood clots. Further work up deferred.      09/19- She had persistently low blood pressures.  Was initially given 2 boluses, but her blood pressures remained soft.Rapid response team rounded on her for low blood pressures.She was started on hydrocortisone 100 mg 3 times daily IV + Midodrine 5mg TID-PRN.  Changed from Heparin to oral therapy was deferred due to her unstable blood pressures.  She was monitored for hypotension. Hemoglobin stable. Orthostatics were negative.     09/20- Heparin drip stopped 09/20.  Patient shifted back to Xarelto.     09/21- Patient was scheduled with oxycodone for her right lower extremity pain. Hydrocortisone tapered from 100mg 3 times daily to 50mg 3 times daily.     09/22- Overnight telemetry showed  frequent pauses with sustained HR 42. Patient was asymptomatic, EKG showed PAC with prolonged MD. Hydrocortisone tapered down to 10 mg in the morning and 5 mg at night. Potassium was low, repleting as necessary.    9/23/24  Patient was kept in the hospital due to pauses on telemetry and EKG.    Interval Problem Update  -Patient had 2 new episodes of pauses a 1.5 seconds during the night, patient is as symptomatic, telemetry call saying that there was AV blocks secondary type II, cardiology was consulted and they mention they will see the patient waiting for recommendations we will appreciate.  -Patient blood pressure within normal limits    I have discussed this patient's plan of care and discharge plan at IDT rounds today with Case Management, Nursing, Nursing leadership, and other members of the IDT team.    Consultants/Specialty  -Hematology  -Cardiology    Code Status  Full Code    Disposition  The patient is not medically cleared for  discharge to home or a post-acute facility.      I have placed the appropriate orders for post-discharge needs.    Review of Systems  Review of Systems   Constitutional:  Negative for chills and fever.   HENT:  Negative for hearing loss and tinnitus.    Eyes:  Negative for blurred vision and double vision.   Respiratory:  Negative for cough, hemoptysis and sputum production.    Cardiovascular:  Negative for chest pain, palpitations and orthopnea.   Gastrointestinal:  Negative for heartburn, nausea and vomiting.   Genitourinary:  Negative for dysuria, frequency and urgency.   Musculoskeletal:  Negative for myalgias and neck pain.   Neurological:  Negative for dizziness, tingling, tremors and headaches.   Endo/Heme/Allergies:  Negative for environmental allergies. Does not bruise/bleed easily.        Physical Exam  Temp:  [35.9 °C (96.6 °F)-36.4 °C (97.6 °F)] 36.4 °C (97.6 °F)  Pulse:  [52-67] 67  Resp:  [17-18] 17  BP: ()/(50-64) 125/64  SpO2:  [95 %-100 %] 97 %    Physical Exam  Constitutional:       General: She is not in acute distress.     Appearance: She is not ill-appearing.   Eyes:      General:         Right eye: No discharge.         Left eye: No discharge.   Cardiovascular:      Rate and Rhythm: Normal rate.      Heart sounds: No murmur heard.     No gallop.      Comments: Presenting some episodes of bradycardia in the 50's as symptomatic  Pulmonary:      Effort: No respiratory distress.      Breath sounds: No stridor. No wheezing.   Abdominal:      General: There is no distension.      Palpations: There is no mass.      Tenderness: There is no abdominal tenderness.   Musculoskeletal:         General: Tenderness (Right lower extremity tenderness) present.      Cervical back: No rigidity.      Right lower leg: Edema present.      Left lower leg: Edema present.   Skin:     Capillary Refill: Capillary refill takes less than 2 seconds.      Coloration: Skin is not jaundiced or pale.      Findings: No  bruising.   Neurological:      Mental Status: She is oriented to person, place, and time.         Fluids    Intake/Output Summary (Last 24 hours) at 9/24/2024 1435  Last data filed at 9/24/2024 1200  Gross per 24 hour   Intake 1350 ml   Output 1700 ml   Net -350 ml       Laboratory  Recent Labs     09/23/24  0849   WBC 7.0   RBC 4.18*   HEMOGLOBIN 11.6*   HEMATOCRIT 36.5*   MCV 87.3   MCH 27.8   MCHC 31.8*   RDW 64.7*   PLATELETCT 247   MPV 10.0     Recent Labs     09/23/24  0849 09/24/24  0240   SODIUM 139 138   POTASSIUM 4.5 3.7   CHLORIDE 103 103   CO2 29 22   GLUCOSE 143* 120*   BUN 12 13   CREATININE 0.65 0.59   CALCIUM 8.2* 8.3*                   Imaging  CT-CTA CHEST PULMONARY ARTERY W/ RECONS   Final Result      1.  No evidence of pulmonary embolism.   2.  L2 vertebral body superior endplate collapse, partly in the field-of-view.   3.  Cardiomegaly.   4.  Coronary calcification.   5.  No acute infiltrates.      Fleischner Society pulmonary nodule recommendations:         US-EXTREMITY VENOUS LOWER BILAT   Final Result      DX-TIBIA AND FIBULA RIGHT   Final Result      Demineralization, postsurgical and degenerative change without acute fracture or malalignment of the tibia and fibula.           Assessment/Plan  Problem Representation:    * DVT (deep venous thrombosis) (CMS-[facility])- (present on admission)  Assessment & Plan  Patient presented with increased bilateral lower extremity swelling 09/17   - Ultrasound bilateral lower extremity 09/17- showed acute to subacute, partially occlusive thrombus of the right distal common femoral vein. Acute to subacute, partially occlusive thrombus in the right proximal,middle and distal femoral vein.  - Was previously on Xarelto.  Will hold 09/17  - Resumed Xarelto 15mg BID since 09/20.  - Started on Lovenox in the  mg.  Held on 09/17  - Heparin Infusion started 09/18. Discontinued 09/20  - CT for PE negative  - Hematology consulted for possible Xarelto failure - Ruled  out 09/18. Signed off      Paroxysmal atrial fibrillation with conversion pauses (HCC)- (present on admission)  Assessment & Plan  - Recent onset of Afib in April 2024, patient has been presenting the positives for the last 2 days patient has symptomatic.  - Metoprolol on hold for asymptomatic bradycardia and frequent pauses during sleep for the past two days. (09/22, 09/23).  - Will continue to monitor on telemetry  -Cardiology consulted will appreciate recommendation      Hypotension  Assessment & Plan  - Resolved.   - tapering on IV Hyrdrocotisone 50mg TID. 09/21- Transitioned to Oral 10mg in the morning and 5 mg at night.   - 09/23 - tapered down to oral 5 mg twice daily. /62.  - Morning cortisol 124 on 09/20.  - Orthostatics: Negative  - On Midodrine 5mg PRN   - Hb stable  - Will monitor    Hypothyroidism- (present on admission)  Assessment & Plan  Her TSH is 72.5 and her free T4 is 0.66.  When inquired about her taking thyroid medications she said that she is not aware of any thyroid medications that she takes.  - Patient counseled   - Home dose of Levothyroxine resumed     Leg swelling- (present on admission)  Assessment & Plan  Patient denies shortness of breath, chest pain, orthopnea, dyspnea.  - No signs of heart failure currently. Recent Echo in April 24, EF 60% and elevated right sided pressures. RVSF 62 mmHg.  - Probably secondary to DVT  - Pro .  - Daily weights.  - I/O charting  - PT/OT  - BMP if needed   - 09/23: Swellings resolved but pain present.    Mood disorder (HCC)- (present on admission)  Assessment & Plan  Will continue home medications.         VTE prophylaxis: Therapeutic anticoagulation with Xarelto    I have performed a physical exam and reviewed and updated ROS and Plan today (9/24/2024). In review of yesterday's note (9/23/2024), there are no changes except as documented above.

## 2024-09-24 NOTE — PROGRESS NOTES
Monitor Summary:    Rhythm: Sinus Chirag- Sinus Rhythm  HR: 53-83  Ectopy: Frequent PACs, Rare PVCs, Rare PACs, intermittent runs of A.fib nonsustained. 1.5 second pause x2  MD notified of ectopy pt asymptomatic during events.  .17/.12/.41    Per strip from monitor room

## 2024-09-24 NOTE — CARE PLAN
The patient is Watcher - Medium risk of patient condition declining or worsening    Shift Goals  Clinical Goals: Monitor RLE swelling, blood sugar, VS, Rest  Patient Goals: Rest  Family Goals: isabelle    Progress made toward(s) clinical / shift goals:        Problem: Pain - Standard  Goal: Alleviation of pain or a reduction in pain to the patient’s comfort goal  Description: Target End Date:  Prior to discharge or change in level of care    Document on Vitals flowsheet    1.  Document pain using the appropriate pain scale per order or unit policy  2.  Educate and implement non-pharmacologic comfort measures (i.e. relaxation, distraction, massage, cold/heat therapy, etc.)  3.  Pain management medications as ordered  4.  Reassess pain after pain med administration per policy  5.  If opiods administered assess patient's response to pain medication is appropriate per POSS sedation scale  6.  Follow pain management plan developed in collaboration with patient and interdisciplinary team (including palliative care or pain specialists if applicable)  Outcome: Progressing  Flowsheets (Taken 9/23/2024 2129)  Non Verbal Scale:   Calm   Sleeping  Note: No report of pain.      Problem: Fall Risk  Goal: Patient will remain free from falls  Description: Target End Date:  Prior to discharge or change in level of care    Document interventions on the [facility] Fall Risk Assessment    1.  Assess for fall risk factors  2.  Implement fall precautions  Outcome: Progressing  Note: Pt remained safe uses call light appropriately, fall precautions in place.      Problem: Skin Integrity  Goal: Skin integrity is maintained or improved  Description: Target End Date:  Prior to discharge or change in level of care    Document interventions on Skin Risk/[place] flowsheet groups and corresponding LDA    1.  Assess and monitor skin integrity, appearance and/or temperature  2.  Assess risk factors for impaired skin integrity and/or pressures  "ulcers  3.  Implement precautions to protect skin integrity in collaboration with interdisciplinary team  4.  Implement pressure ulcer prevention protocol if at risk for skin breakdown  5.  Confirm wound care consult if at risk for skin breakdown  6.  Ensure patient use of pressure relieving devices  (Low air loss bed, waffle overlay, heel protectors, ROHO cushion, etc)  Outcome: Progressing  Note: Purwick in place to help keep Pt radha area dry, Pt able to ambulate safely to bathroom and returns to bed.  Bilat LE edematous, pale, warm, tender to touch, Pt stated, \"they don't hurt if their not touched\"      Problem: Infection - Diabetes  Goal: Patient will remain free from signs and symptoms of infection  Description: Target End Date:  Prior to discharge or change in level of care    1.  Monitor for signs and symptoms of infection  2.  Monitor for changes in breath sounds (crackles, rhonchi)  3.  Place in semi-Fowlers position  4.  Encourage and assist with oral hygiene  5.  Patient and family/caregiver instructed on hand washing techniques  6.  Patient will be instructed on proper hygiene practices with finger sticks and insulin injections  Outcome: Progressing  Note: Pt feet intact, dry, +2 pedal pulses, cool to the touch.     Encouraged oral care to help protect her lungs from respiratory infection.                      Patient is not progressing towards the following goals:      "

## 2024-09-24 NOTE — CARE PLAN
The patient is Stable - Low risk of patient condition declining or worsening    Shift Goals  Clinical Goals: Monitor BG, xarelto, monitor LE edema  Patient Goals: Comfort  Family Goals: JANETH    Progress made toward(s) clinical / shift goals:        Problem: Pain - Standard  Goal: Alleviation of pain or a reduction in pain to the patient’s comfort goal  Outcome: Progressing  Note: Pain assessed. Pain denies pain.     Problem: Fall Risk  Goal: Patient will remain free from falls  Outcome: Progressing  Note: Fall risk assessed. Fall precautions implemented including bed and chair alarm, call light in reach, hourly rounding, treaded socks.     Problem: Skin Integrity  Goal: Skin integrity is maintained or improved  Outcome: Progressing  Note: Patient turned q2H. Patient up to chair.       Patient is not progressing towards the following goals:

## 2024-09-24 NOTE — PROGRESS NOTES
Telemetry Report:        Per Telestrip from Monitor Room     SB-SR [id number]  PAC, PVC, Afib  .20/.10/.39

## 2024-09-24 NOTE — CARE PLAN
The patient is Watcher - Medium risk of patient condition declining or worsening    Shift Goals  Clinical Goals: Monitor right leg, monitor blood sugar and vital signs  Patient Goals: rest  Family Goals: JANETH    Progress made toward(s) clinical / shift goals:        Problem: Fall Risk  Goal: Patient will remain free from falls  Description: Target End Date:  Prior to discharge or change in level of care    Document interventions on the [facility] Fall Risk Assessment    1.  Assess for fall risk factors  2.  Implement fall precautions  Outcome: Progressing  Note: Patient used the call light before ambulating. Demonstrated appropriate use of FWW     Problem: Pain Management  Goal: Pain level will decrease to patient's comfort goal  Outcome: Progressing  Flowsheets  Taken 9/23/2024 1826 by Isis Powell R.NKyara  Pain Rating Scale (NPRS): 2  Taken 9/18/2024 2115 by Ryan Vieira RSEAN  Non Verbal Scale:   Calm   Unlabored Breathing  Note: Patient reported pain on anterior lower leg. Lidocaine patch was placed for pain relief.

## 2024-09-24 NOTE — DISCHARGE INSTRUCTIONS
-Please follow-up with PCP  -Please follow-up with sleep medicine for sleep study  -Will hold beta-blockers and will continue with therapeutic anticoagulation  -Continue Xarelto as prescribed and follow-up with PCP  -Oxycodone prescribed just for severe pain if not required to prevent taking it

## 2024-11-12 ENCOUNTER — TELEPHONE (OUTPATIENT)
Dept: SCHEDULING | Facility: IMAGING CENTER | Age: 69
End: 2024-11-12
Payer: MEDICARE

## 2025-01-12 ENCOUNTER — APPOINTMENT (OUTPATIENT)
Dept: RADIOLOGY | Facility: MEDICAL CENTER | Age: 70
DRG: 640 | End: 2025-01-12
Attending: EMERGENCY MEDICINE
Payer: COMMERCIAL

## 2025-01-12 ENCOUNTER — HOSPITAL ENCOUNTER (INPATIENT)
Facility: MEDICAL CENTER | Age: 70
LOS: 1 days | DRG: 640 | End: 2025-01-13
Attending: EMERGENCY MEDICINE | Admitting: STUDENT IN AN ORGANIZED HEALTH CARE EDUCATION/TRAINING PROGRAM
Payer: COMMERCIAL

## 2025-01-12 DIAGNOSIS — R11.2 NAUSEA AND VOMITING, UNSPECIFIED VOMITING TYPE: ICD-10-CM

## 2025-01-12 DIAGNOSIS — U07.1 COVID-19: ICD-10-CM

## 2025-01-12 DIAGNOSIS — U07.1 COVID-19 VIRUS INFECTION: ICD-10-CM

## 2025-01-12 DIAGNOSIS — E87.6 HYPOKALEMIA: ICD-10-CM

## 2025-01-12 LAB
ALBUMIN SERPL BCP-MCNC: 3.9 G/DL (ref 3.2–4.9)
ALBUMIN/GLOB SERPL: 1.2 G/DL
ALP SERPL-CCNC: 139 U/L (ref 30–99)
ALT SERPL-CCNC: 8 U/L (ref 2–50)
ANION GAP SERPL CALC-SCNC: 15 MMOL/L (ref 7–16)
APPEARANCE UR: CLEAR
AST SERPL-CCNC: 15 U/L (ref 12–45)
BASOPHILS # BLD AUTO: 0.3 % (ref 0–1.8)
BASOPHILS # BLD: 0.03 K/UL (ref 0–0.12)
BILIRUB SERPL-MCNC: 0.7 MG/DL (ref 0.1–1.5)
BILIRUB UR QL STRIP.AUTO: NEGATIVE
BUN SERPL-MCNC: 10 MG/DL (ref 8–22)
CALCIUM ALBUM COR SERPL-MCNC: 8.8 MG/DL (ref 8.5–10.5)
CALCIUM SERPL-MCNC: 8.7 MG/DL (ref 8.5–10.5)
CHLORIDE SERPL-SCNC: 98 MMOL/L (ref 96–112)
CO2 SERPL-SCNC: 22 MMOL/L (ref 20–33)
COLOR UR: YELLOW
CREAT SERPL-MCNC: 0.54 MG/DL (ref 0.5–1.4)
EKG IMPRESSION: NORMAL
EOSINOPHIL # BLD AUTO: 0.03 K/UL (ref 0–0.51)
EOSINOPHIL NFR BLD: 0.3 % (ref 0–6.9)
ERYTHROCYTE [DISTWIDTH] IN BLOOD BY AUTOMATED COUNT: 55.6 FL (ref 35.9–50)
EST. AVERAGE GLUCOSE BLD GHB EST-MCNC: 100 MG/DL
FLUAV RNA SPEC QL NAA+PROBE: NEGATIVE
FLUBV RNA SPEC QL NAA+PROBE: NEGATIVE
GFR SERPLBLD CREATININE-BSD FMLA CKD-EPI: 98 ML/MIN/1.73 M 2
GLOBULIN SER CALC-MCNC: 3.3 G/DL (ref 1.9–3.5)
GLUCOSE SERPL-MCNC: 153 MG/DL (ref 65–99)
GLUCOSE UR STRIP.AUTO-MCNC: NEGATIVE MG/DL
HBA1C MFR BLD: 5.1 % (ref 4–5.6)
HCT VFR BLD AUTO: 38.2 % (ref 37–47)
HGB BLD-MCNC: 12.2 G/DL (ref 12–16)
IMM GRANULOCYTES # BLD AUTO: 0.03 K/UL (ref 0–0.11)
IMM GRANULOCYTES NFR BLD AUTO: 0.3 % (ref 0–0.9)
KETONES UR STRIP.AUTO-MCNC: 15 MG/DL
LACTATE SERPL-SCNC: 1.3 MMOL/L (ref 0.5–2)
LEUKOCYTE ESTERASE UR QL STRIP.AUTO: NEGATIVE
LIPASE SERPL-CCNC: 18 U/L (ref 11–82)
LYMPHOCYTES # BLD AUTO: 0.74 K/UL (ref 1–4.8)
LYMPHOCYTES NFR BLD: 7.9 % (ref 22–41)
MAGNESIUM SERPL-MCNC: 1.8 MG/DL (ref 1.5–2.5)
MCH RBC QN AUTO: 25.4 PG (ref 27–33)
MCHC RBC AUTO-ENTMCNC: 31.9 G/DL (ref 32.2–35.5)
MCV RBC AUTO: 79.6 FL (ref 81.4–97.8)
MICRO URNS: ABNORMAL
MONOCYTES # BLD AUTO: 0.47 K/UL (ref 0–0.85)
MONOCYTES NFR BLD AUTO: 5 % (ref 0–13.4)
NEUTROPHILS # BLD AUTO: 8.07 K/UL (ref 1.82–7.42)
NEUTROPHILS NFR BLD: 86.2 % (ref 44–72)
NITRITE UR QL STRIP.AUTO: NEGATIVE
NRBC # BLD AUTO: 0 K/UL
NRBC BLD-RTO: 0 /100 WBC (ref 0–0.2)
PH UR STRIP.AUTO: 7.5 [PH] (ref 5–8)
PLATELET # BLD AUTO: 297 K/UL (ref 164–446)
PMV BLD AUTO: 8.7 FL (ref 9–12.9)
POTASSIUM SERPL-SCNC: 2.9 MMOL/L (ref 3.6–5.5)
PROT SERPL-MCNC: 7.2 G/DL (ref 6–8.2)
PROT UR QL STRIP: NEGATIVE MG/DL
RBC # BLD AUTO: 4.8 M/UL (ref 4.2–5.4)
RBC UR QL AUTO: NEGATIVE
RSV RNA SPEC QL NAA+PROBE: NEGATIVE
SARS-COV-2 RNA RESP QL NAA+PROBE: DETECTED
SODIUM SERPL-SCNC: 135 MMOL/L (ref 135–145)
SP GR UR STRIP.AUTO: 1.04
UROBILINOGEN UR STRIP.AUTO-MCNC: 1 EU/DL
WBC # BLD AUTO: 9.4 K/UL (ref 4.8–10.8)

## 2025-01-12 PROCEDURE — 700117 HCHG RX CONTRAST REV CODE 255: Mod: UD | Performed by: EMERGENCY MEDICINE

## 2025-01-12 PROCEDURE — 99223 1ST HOSP IP/OBS HIGH 75: CPT | Performed by: STUDENT IN AN ORGANIZED HEALTH CARE EDUCATION/TRAINING PROGRAM

## 2025-01-12 PROCEDURE — 85025 COMPLETE CBC W/AUTO DIFF WBC: CPT

## 2025-01-12 PROCEDURE — 93005 ELECTROCARDIOGRAM TRACING: CPT | Mod: TC | Performed by: EMERGENCY MEDICINE

## 2025-01-12 PROCEDURE — 83735 ASSAY OF MAGNESIUM: CPT

## 2025-01-12 PROCEDURE — 96375 TX/PRO/DX INJ NEW DRUG ADDON: CPT

## 2025-01-12 PROCEDURE — 80053 COMPREHEN METABOLIC PANEL: CPT

## 2025-01-12 PROCEDURE — 700111 HCHG RX REV CODE 636 W/ 250 OVERRIDE (IP): Mod: JZ,UD | Performed by: EMERGENCY MEDICINE

## 2025-01-12 PROCEDURE — 74177 CT ABD & PELVIS W/CONTRAST: CPT

## 2025-01-12 PROCEDURE — 99285 EMERGENCY DEPT VISIT HI MDM: CPT

## 2025-01-12 PROCEDURE — A9270 NON-COVERED ITEM OR SERVICE: HCPCS | Performed by: STUDENT IN AN ORGANIZED HEALTH CARE EDUCATION/TRAINING PROGRAM

## 2025-01-12 PROCEDURE — 83605 ASSAY OF LACTIC ACID: CPT

## 2025-01-12 PROCEDURE — 81003 URINALYSIS AUTO W/O SCOPE: CPT

## 2025-01-12 PROCEDURE — 83690 ASSAY OF LIPASE: CPT

## 2025-01-12 PROCEDURE — 36415 COLL VENOUS BLD VENIPUNCTURE: CPT

## 2025-01-12 PROCEDURE — 96365 THER/PROPH/DIAG IV INF INIT: CPT

## 2025-01-12 PROCEDURE — 96366 THER/PROPH/DIAG IV INF ADDON: CPT

## 2025-01-12 PROCEDURE — 71045 X-RAY EXAM CHEST 1 VIEW: CPT

## 2025-01-12 PROCEDURE — 700105 HCHG RX REV CODE 258: Mod: UD | Performed by: EMERGENCY MEDICINE

## 2025-01-12 PROCEDURE — 0241U HCHG SARS-COV-2 COVID-19 NFCT DS RESP RNA 4 TRGT ED POC: CPT

## 2025-01-12 PROCEDURE — 700102 HCHG RX REV CODE 250 W/ 637 OVERRIDE(OP): Performed by: STUDENT IN AN ORGANIZED HEALTH CARE EDUCATION/TRAINING PROGRAM

## 2025-01-12 PROCEDURE — 306637 HCHG MISC ORTHO ITEM RC 0274

## 2025-01-12 PROCEDURE — 700111 HCHG RX REV CODE 636 W/ 250 OVERRIDE (IP): Performed by: STUDENT IN AN ORGANIZED HEALTH CARE EDUCATION/TRAINING PROGRAM

## 2025-01-12 PROCEDURE — 83036 HEMOGLOBIN GLYCOSYLATED A1C: CPT

## 2025-01-12 RX ORDER — SODIUM CHLORIDE 9 MG/ML
INJECTION, SOLUTION INTRAVENOUS CONTINUOUS
Status: DISCONTINUED | OUTPATIENT
Start: 2025-01-12 | End: 2025-01-13

## 2025-01-12 RX ORDER — ENOXAPARIN SODIUM 100 MG/ML
40 INJECTION SUBCUTANEOUS DAILY
Status: DISCONTINUED | OUTPATIENT
Start: 2025-01-12 | End: 2025-01-12

## 2025-01-12 RX ORDER — SODIUM CHLORIDE, SODIUM LACTATE, POTASSIUM CHLORIDE, CALCIUM CHLORIDE 600; 310; 30; 20 MG/100ML; MG/100ML; MG/100ML; MG/100ML
1000 INJECTION, SOLUTION INTRAVENOUS ONCE
Status: COMPLETED | OUTPATIENT
Start: 2025-01-12 | End: 2025-01-13

## 2025-01-12 RX ORDER — INSULIN LISPRO 100 [IU]/ML
1-6 INJECTION, SOLUTION INTRAVENOUS; SUBCUTANEOUS
Status: DISCONTINUED | OUTPATIENT
Start: 2025-01-13 | End: 2025-01-13

## 2025-01-12 RX ORDER — HYDRALAZINE HYDROCHLORIDE 20 MG/ML
10 INJECTION INTRAMUSCULAR; INTRAVENOUS EVERY 4 HOURS PRN
Status: DISCONTINUED | OUTPATIENT
Start: 2025-01-12 | End: 2025-01-13

## 2025-01-12 RX ORDER — MORPHINE SULFATE 4 MG/ML
4 INJECTION INTRAVENOUS ONCE
Status: COMPLETED | OUTPATIENT
Start: 2025-01-12 | End: 2025-01-12

## 2025-01-12 RX ORDER — DEXTROSE MONOHYDRATE 25 G/50ML
25 INJECTION, SOLUTION INTRAVENOUS
Status: DISCONTINUED | OUTPATIENT
Start: 2025-01-12 | End: 2025-01-13 | Stop reason: HOSPADM

## 2025-01-12 RX ORDER — LEVOTHYROXINE SODIUM 125 UG/1
125 TABLET ORAL
Status: DISCONTINUED | OUTPATIENT
Start: 2025-01-13 | End: 2025-01-13 | Stop reason: HOSPADM

## 2025-01-12 RX ORDER — IBUPROFEN 200 MG
600 TABLET ORAL EVERY 6 HOURS PRN
COMMUNITY

## 2025-01-12 RX ORDER — HYDROCORTISONE 5 MG/1
5 TABLET ORAL 2 TIMES DAILY
Status: DISCONTINUED | OUTPATIENT
Start: 2025-01-12 | End: 2025-01-13

## 2025-01-12 RX ORDER — ONDANSETRON 4 MG/1
4 TABLET, ORALLY DISINTEGRATING ORAL EVERY 4 HOURS PRN
Status: DISCONTINUED | OUTPATIENT
Start: 2025-01-13 | End: 2025-01-13 | Stop reason: HOSPADM

## 2025-01-12 RX ORDER — ONDANSETRON 2 MG/ML
4 INJECTION INTRAMUSCULAR; INTRAVENOUS EVERY 4 HOURS PRN
Status: DISCONTINUED | OUTPATIENT
Start: 2025-01-13 | End: 2025-01-13 | Stop reason: HOSPADM

## 2025-01-12 RX ORDER — POTASSIUM CHLORIDE 1500 MG/1
40 TABLET, EXTENDED RELEASE ORAL ONCE
Status: COMPLETED | OUTPATIENT
Start: 2025-01-12 | End: 2025-01-12

## 2025-01-12 RX ORDER — GABAPENTIN 400 MG/1
400 CAPSULE ORAL 3 TIMES DAILY
Status: DISCONTINUED | OUTPATIENT
Start: 2025-01-12 | End: 2025-01-13

## 2025-01-12 RX ORDER — ACETAMINOPHEN 325 MG/1
650 TABLET ORAL EVERY 6 HOURS PRN
Status: DISCONTINUED | OUTPATIENT
Start: 2025-01-12 | End: 2025-01-13 | Stop reason: HOSPADM

## 2025-01-12 RX ORDER — METOCLOPRAMIDE HYDROCHLORIDE 5 MG/ML
10 INJECTION INTRAMUSCULAR; INTRAVENOUS ONCE
Status: COMPLETED | OUTPATIENT
Start: 2025-01-12 | End: 2025-01-12

## 2025-01-12 RX ORDER — POTASSIUM CHLORIDE 7.45 MG/ML
10 INJECTION INTRAVENOUS
Status: DISCONTINUED | OUTPATIENT
Start: 2025-01-12 | End: 2025-01-12

## 2025-01-12 RX ORDER — ONDANSETRON 2 MG/ML
4 INJECTION INTRAMUSCULAR; INTRAVENOUS ONCE
Status: COMPLETED | OUTPATIENT
Start: 2025-01-12 | End: 2025-01-12

## 2025-01-12 RX ORDER — POTASSIUM CHLORIDE 7.45 MG/ML
10 INJECTION INTRAVENOUS
Status: DISPENSED | OUTPATIENT
Start: 2025-01-12 | End: 2025-01-13

## 2025-01-12 RX ORDER — ATORVASTATIN CALCIUM 10 MG/1
10 TABLET, FILM COATED ORAL EVERY EVENING
Status: DISCONTINUED | OUTPATIENT
Start: 2025-01-13 | End: 2025-01-13 | Stop reason: HOSPADM

## 2025-01-12 RX ORDER — TRAZODONE HYDROCHLORIDE 100 MG/1
100 TABLET ORAL NIGHTLY
Status: DISCONTINUED | OUTPATIENT
Start: 2025-01-13 | End: 2025-01-13 | Stop reason: HOSPADM

## 2025-01-12 RX ORDER — ALBUTEROL SULFATE 90 UG/1
2 INHALANT RESPIRATORY (INHALATION) EVERY 6 HOURS PRN
Status: DISCONTINUED | OUTPATIENT
Start: 2025-01-12 | End: 2025-01-13 | Stop reason: HOSPADM

## 2025-01-12 RX ADMIN — POTASSIUM CHLORIDE 40 MEQ: 1500 TABLET, EXTENDED RELEASE ORAL at 23:18

## 2025-01-12 RX ADMIN — METOCLOPRAMIDE HYDROCHLORIDE 10 MG: 5 INJECTION INTRAMUSCULAR; INTRAVENOUS at 22:12

## 2025-01-12 RX ADMIN — POTASSIUM CHLORIDE 10 MEQ: 7.46 INJECTION, SOLUTION INTRAVENOUS at 22:17

## 2025-01-12 RX ADMIN — LIDOCAINE HYDROCHLORIDE 15 ML: 20 SOLUTION ORAL; TOPICAL at 23:16

## 2025-01-12 RX ADMIN — ONDANSETRON 4 MG: 2 INJECTION INTRAMUSCULAR; INTRAVENOUS at 20:55

## 2025-01-12 RX ADMIN — SODIUM CHLORIDE, POTASSIUM CHLORIDE, SODIUM LACTATE AND CALCIUM CHLORIDE 1000 ML: 600; 310; 30; 20 INJECTION, SOLUTION INTRAVENOUS at 21:00

## 2025-01-12 RX ADMIN — MORPHINE SULFATE 4 MG: 4 INJECTION, SOLUTION INTRAMUSCULAR; INTRAVENOUS at 20:55

## 2025-01-12 RX ADMIN — IOHEXOL 95 ML: 350 INJECTION, SOLUTION INTRAVENOUS at 22:00

## 2025-01-12 RX ADMIN — POTASSIUM CHLORIDE 10 MEQ: 7.46 INJECTION, SOLUTION INTRAVENOUS at 23:17

## 2025-01-12 ASSESSMENT — ENCOUNTER SYMPTOMS
DIARRHEA: 0
ABDOMINAL PAIN: 1
VOMITING: 1
EYE DISCHARGE: 0
ORTHOPNEA: 0
EYE PAIN: 0
FEVER: 0
BACK PAIN: 0
PHOTOPHOBIA: 0
HEADACHES: 0
DOUBLE VISION: 0
DIZZINESS: 0
PALPITATIONS: 0
NAUSEA: 1
CHILLS: 0
NECK PAIN: 0

## 2025-01-12 ASSESSMENT — PAIN DESCRIPTION - PAIN TYPE
TYPE: ACUTE PAIN;CHRONIC PAIN
TYPE: ACUTE PAIN

## 2025-01-12 ASSESSMENT — FIBROSIS 4 INDEX: FIB4 SCORE: 1.77

## 2025-01-13 VITALS
HEIGHT: 63 IN | OXYGEN SATURATION: 93 % | SYSTOLIC BLOOD PRESSURE: 167 MMHG | HEART RATE: 69 BPM | RESPIRATION RATE: 17 BRPM | BODY MASS INDEX: 42.52 KG/M2 | DIASTOLIC BLOOD PRESSURE: 79 MMHG | WEIGHT: 240 LBS | TEMPERATURE: 97.2 F

## 2025-01-13 LAB
ALBUMIN SERPL BCP-MCNC: 3.2 G/DL (ref 3.2–4.9)
ALBUMIN/GLOB SERPL: 1.1 G/DL
ALP SERPL-CCNC: 119 U/L (ref 30–99)
ALT SERPL-CCNC: 5 U/L (ref 2–50)
ANION GAP SERPL CALC-SCNC: 10 MMOL/L (ref 7–16)
AST SERPL-CCNC: 17 U/L (ref 12–45)
BILIRUB SERPL-MCNC: 0.5 MG/DL (ref 0.1–1.5)
BUN SERPL-MCNC: 7 MG/DL (ref 8–22)
CALCIUM ALBUM COR SERPL-MCNC: 8.7 MG/DL (ref 8.5–10.5)
CALCIUM SERPL-MCNC: 8.1 MG/DL (ref 8.5–10.5)
CHLORIDE SERPL-SCNC: 101 MMOL/L (ref 96–112)
CO2 SERPL-SCNC: 24 MMOL/L (ref 20–33)
CREAT SERPL-MCNC: 0.56 MG/DL (ref 0.5–1.4)
ERYTHROCYTE [DISTWIDTH] IN BLOOD BY AUTOMATED COUNT: 56.1 FL (ref 35.9–50)
GFR SERPLBLD CREATININE-BSD FMLA CKD-EPI: 98 ML/MIN/1.73 M 2
GLOBULIN SER CALC-MCNC: 3 G/DL (ref 1.9–3.5)
GLUCOSE BLD STRIP.AUTO-MCNC: 100 MG/DL (ref 65–99)
GLUCOSE SERPL-MCNC: 108 MG/DL (ref 65–99)
HCT VFR BLD AUTO: 34.3 % (ref 37–47)
HGB BLD-MCNC: 10.6 G/DL (ref 12–16)
MCH RBC QN AUTO: 24.7 PG (ref 27–33)
MCHC RBC AUTO-ENTMCNC: 30.9 G/DL (ref 32.2–35.5)
MCV RBC AUTO: 80 FL (ref 81.4–97.8)
PLATELET # BLD AUTO: 282 K/UL (ref 164–446)
PMV BLD AUTO: 8.7 FL (ref 9–12.9)
POTASSIUM SERPL-SCNC: 3.1 MMOL/L (ref 3.6–5.5)
PROT SERPL-MCNC: 6.2 G/DL (ref 6–8.2)
RBC # BLD AUTO: 4.29 M/UL (ref 4.2–5.4)
SODIUM SERPL-SCNC: 135 MMOL/L (ref 135–145)
WBC # BLD AUTO: 8.9 K/UL (ref 4.8–10.8)

## 2025-01-13 PROCEDURE — 700102 HCHG RX REV CODE 250 W/ 637 OVERRIDE(OP): Performed by: STUDENT IN AN ORGANIZED HEALTH CARE EDUCATION/TRAINING PROGRAM

## 2025-01-13 PROCEDURE — 36415 COLL VENOUS BLD VENIPUNCTURE: CPT

## 2025-01-13 PROCEDURE — 85027 COMPLETE CBC AUTOMATED: CPT

## 2025-01-13 PROCEDURE — 700105 HCHG RX REV CODE 258: Performed by: STUDENT IN AN ORGANIZED HEALTH CARE EDUCATION/TRAINING PROGRAM

## 2025-01-13 PROCEDURE — 99239 HOSP IP/OBS DSCHRG MGMT >30: CPT | Performed by: STUDENT IN AN ORGANIZED HEALTH CARE EDUCATION/TRAINING PROGRAM

## 2025-01-13 PROCEDURE — 82962 GLUCOSE BLOOD TEST: CPT

## 2025-01-13 PROCEDURE — A9270 NON-COVERED ITEM OR SERVICE: HCPCS | Performed by: STUDENT IN AN ORGANIZED HEALTH CARE EDUCATION/TRAINING PROGRAM

## 2025-01-13 PROCEDURE — 700111 HCHG RX REV CODE 636 W/ 250 OVERRIDE (IP): Performed by: STUDENT IN AN ORGANIZED HEALTH CARE EDUCATION/TRAINING PROGRAM

## 2025-01-13 PROCEDURE — 96366 THER/PROPH/DIAG IV INF ADDON: CPT

## 2025-01-13 PROCEDURE — 80053 COMPREHEN METABOLIC PANEL: CPT

## 2025-01-13 RX ORDER — ACETAMINOPHEN 500 MG
1000 TABLET ORAL EVERY 6 HOURS PRN
COMMUNITY
Start: 2025-01-13

## 2025-01-13 RX ORDER — ONDANSETRON 4 MG/1
4 TABLET, ORALLY DISINTEGRATING ORAL EVERY 4 HOURS PRN
Qty: 30 TABLET | Refills: 0 | Status: SHIPPED | OUTPATIENT
Start: 2025-01-13

## 2025-01-13 RX ORDER — PROCHLORPERAZINE MALEATE 10 MG
10 TABLET ORAL EVERY 6 HOURS PRN
Status: DISCONTINUED | OUTPATIENT
Start: 2025-01-13 | End: 2025-01-13 | Stop reason: HOSPADM

## 2025-01-13 RX ORDER — PROCHLORPERAZINE EDISYLATE 5 MG/ML
10 INJECTION INTRAMUSCULAR; INTRAVENOUS EVERY 6 HOURS PRN
Status: DISCONTINUED | OUTPATIENT
Start: 2025-01-13 | End: 2025-01-13 | Stop reason: HOSPADM

## 2025-01-13 RX ORDER — PROCHLORPERAZINE MALEATE 5 MG/1
5 TABLET ORAL EVERY 6 HOURS PRN
Qty: 30 TABLET | Refills: 0 | Status: SHIPPED | OUTPATIENT
Start: 2025-01-13

## 2025-01-13 RX ORDER — POTASSIUM CHLORIDE 1500 MG/1
20 TABLET, EXTENDED RELEASE ORAL 2 TIMES DAILY
Qty: 30 TABLET | Refills: 0 | Status: SHIPPED | OUTPATIENT
Start: 2025-01-13

## 2025-01-13 RX ADMIN — LEVOTHYROXINE SODIUM 125 MCG: 0.12 TABLET ORAL at 06:27

## 2025-01-13 RX ADMIN — POTASSIUM CHLORIDE 10 MEQ: 7.46 INJECTION, SOLUTION INTRAVENOUS at 00:18

## 2025-01-13 RX ADMIN — TRAZODONE HYDROCHLORIDE 100 MG: 100 TABLET ORAL at 02:37

## 2025-01-13 RX ADMIN — RIVAROXABAN 20 MG: 20 TABLET, FILM COATED ORAL at 02:37

## 2025-01-13 RX ADMIN — SODIUM CHLORIDE: 9 INJECTION, SOLUTION INTRAVENOUS at 02:36

## 2025-01-13 RX ADMIN — POTASSIUM CHLORIDE 10 MEQ: 7.46 INJECTION, SOLUTION INTRAVENOUS at 02:37

## 2025-01-13 NOTE — ED TRIAGE NOTES
"Chief Complaint   Patient presents with    Flu Like Symptoms     Ongoing symptoms of N/V, stomach cramps, and diarrhea since 12/25/24.     Back Pain     Patient states history of back pain. Patient reports \"throwing her back out\" around maggie. Patient states unable to control pain and having difficulty sleeping.      Patient non ambulatory in wheelchair. Recent DX of UTI. Not taking ABX. To lab for drawn.   "

## 2025-01-13 NOTE — ASSESSMENT & PLAN NOTE
CT imaging: Intrahepatic and extrahepatic biliary ductal dilatation, commonly associated with postcholecystectomy physiology   Continue with IV and PO antiemetics

## 2025-01-13 NOTE — ED PROVIDER NOTES
"ER Provider Note    Scribed for Ten Mirza M.d. by Kennedy Valencia. 1/12/2025  8:57 PM    Primary Care Provider: Maribel Salazar M.D.    CHIEF COMPLAINT   Chief Complaint   Patient presents with    Flu Like Symptoms     Ongoing symptoms of N/V, stomach cramps, and diarrhea since 12/25/24.     Back Pain     Patient states history of back pain. Patient reports \"throwing her back out\" around maggie. Patient states unable to control pain and having difficulty sleeping.      EXTERNAL RECORDS REVIEWED  Inpatient Notes 09/17/24 patient with admission for DVT    HPI/ROS      Nieves Murphy is a 69 y.o. female who presents to the ED complaining of vomiting and inability to keep anything down since 12/25/2024.  Patient reports that she has been unable to keep her all of her medications down.  Patient denies any chest pain or shortness of breath.  Patient denies any new leg swelling.  Patient denies any diarrhea.  Patient denies any blood in her vomit.      PAST MEDICAL HISTORY  Past Medical History:   Diagnosis Date    Anxiety     Arthritis     osteo    Dental disorder 2016    upper denture    DJD (degenerative joint disease)     DVT (deep venous thrombosis) ([facility]) 2-2016    leg right    Gynecological disorder     Heart burn 2016    pain legs and hands; arthritis    Indigestion     Obesity     Restless leg syndrome        SURGICAL HISTORY  Past Surgical History:   Procedure Laterality Date    ANTERIOR AND POSTERIOR REPAIR  7/18/2017    Procedure: ANTERIOR AND POSTERIOR REPAIR ;  Surgeon: Dave Ellis M.D.;  Location: SURGERY SAME DAY [facility] ORS;  Service:     ENTEROCELE REPAIR  7/18/2017    Procedure: ENTEROCELE REPAIR , PERINEOPLASTY ;  Surgeon: Dave Ellis M.D.;  Location: SURGERY SAME DAY [facility] ORS;  Service:     VAGINAL SUSPENSION  7/18/2017    Procedure: VAGINAL SUSPENSION- SACROSPINOUS VAULT ;  Surgeon: Dave Ellis M.D.;  Location: SURGERY SAME DAY [facility] ORS;  Service:     BLADDER " SLING FEMALE  7/18/2017    Procedure: BLADDER SLING FEMALE TOT;  Surgeon: Dave Ellis M.D.;  Location: SURGERY SAME DAY [facility] ORS;  Service:     VAGINAL HYSTERECTOMY SCOPE TOTAL  1/3/2017    Procedure: VAGINAL HYSTERECTOMY SCOPE TOTAL WITH BSO;  Surgeon: Dave Ellis M.D.;  Location: SURGERY SAME DAY [facility] ORS;  Service:     ANTERIOR AND POSTERIOR REPAIR  1/3/2017    Procedure: ANTERIOR AND POSTERIOR REPAIR;  Surgeon: Dave Ellis M.D.;  Location: SURGERY SAME DAY [facility] ORS;  Service:     ENTEROCELE REPAIR  1/3/2017    Procedure: ENTEROCELE REPAIR;  Surgeon: Dave Ellis M.D.;  Location: SURGERY SAME DAY [facility] ORS;  Service:     VAGINAL SUSPENSION  1/3/2017    Procedure: VAGINAL SUSPENSION - SACROSPINOUS VAULT W/PERINEOPLASTY;  Surgeon: Dave Ellis M.D.;  Location: SURGERY SAME DAY [facility] ORS;  Service:     BLADDER SLING FEMALE  1/3/2017    Procedure: BLADDER SLING FEMALE - TOT;  Surgeon: Dave Ellis M.D.;  Location: SURGERY SAME DAY [facility] ORS;  Service:     CYSTOSCOPY  1/3/2017    Procedure: CYSTOSCOPY;  Surgeon: Dave Ellis M.D.;  Location: SURGERY SAME DAY [facility] ORS;  Service:     OTHER ABDOMINAL SURGERY      gallbladder surgery at age 20 yr       FAMILY HISTORY  History reviewed. No pertinent family history.    SOCIAL HISTORY   reports that she quit smoking about 51 years ago. Her smoking use included cigarettes. She does not have any smokeless tobacco history on file. She reports current drug use. Drug: Inhaled. She reports that she does not drink alcohol.    CURRENT MEDICATIONS  Previous Medications    ACETAMINOPHEN (TYLENOL) 500 MG TAB    Take 2 Tablets by mouth every 6 hours as needed for Moderate Pain.    ALBUTEROL 108 (90 BASE) MCG/ACT AERO SOLN INHALATION AEROSOL    Inhale 2 Puffs every 6 hours as needed for Shortness of Breath.    ATORVASTATIN (LIPITOR) 10 MG TAB    Take 1 Tablet by mouth every day.    GABAPENTIN (NEURONTIN) 400 MG CAP    Take 1 Capsule  "by mouth 3 times a day.    HYDROCORTISONE (CORTEF) 5 MG TAB    Take 1 Tablet by mouth 2 times a day.    LEVOTHYROXINE (SYNTHROID) 125 MCG TAB    Take 125 mcg by mouth every morning on an empty stomach.    PANTOPRAZOLE (PROTONIX) 20 MG TABLET    Take 1 Tablet by mouth every day.    SEMAGLUTIDE,0.25 OR 0.5MG/DOS, (OZEMPIC, 0.25 OR 0.5 MG/DOSE,) 2 MG/3ML SOLUTION PEN-INJECTOR    Inject 0.25 mg under the skin every 7 days.    TRAZODONE (DESYREL) 50 MG TAB    Take 2 Tablets by mouth every evening. May take addition 2 tablets as needed    100 mg = 2 tabs       ALLERGIES  Asa [aspirin] and Pcn [penicillins]    PHYSICAL EXAM  BP (!) 182/100   Pulse 85   Temp 36.2 °C (97.2 °F) (Temporal)   Resp 16   Ht 1.6 m (5' 3\")   Wt 109 kg (240 lb)   SpO2 97%   BMI 42.51 kg/m²   Constitutional: Alert in no apparent distress.  HENT: No signs of trauma, Bilateral external ears normal, Nose normal.   Eyes: Pupils are equal and reactive, Conjunctiva normal, Non-icteric.   Neck: Normal range of motion, No tenderness, Supple, No stridor.   Lymphatic: No lymphadenopathy noted.   Cardiovascular: Regular rate and rhythm, no murmurs.   Thorax & Lungs: Normal breath sounds, No respiratory distress, No wheezing, No chest tenderness.   Abdomen: Mild diffuse abdominal tenderness, No peritoneal signs, No masses, No pulsatile masses.   Skin: Warm, Dry, No erythema, No rash.   Back: No bony tenderness, No CVA tenderness.   Extremities: Intact distal pulses, No edema, No tenderness, No cyanosis.  Musculoskeletal: Good range of motion in all major joints. No tenderness to palpation or major deformities noted.   Neurologic: Alert , Normal motor function, Normal sensory function, No focal deficits noted.   Psychiatric: Affect normal, Judgment normal, Mood normal.      DIAGNOSTIC STUDIES    EKG/LABS   Report   Date Value Ref Range Status   01/12/2025       [facility] Emergency Dept.    Test Date:  2025-01-12  Pt Name:    CONRAD" PEPPER                 Department: ER  MRN:        [id number]                      Room:       [facility]  Gender:     Female                       Technician: [id number]  :        [id number]                   Requested By:MOOSE MADDOX  Order #:    [id number]                    Reading MD:    Measurements  Intervals                                Axis  Rate:       75                           P:          0  KS:         0                            QRS:        -68  QRSD:       119                          T:          -7  QT:         456  QTc:        510    Interpretive Statements  Accelerated junctional rhythm  Left anterior fascicular block  Probable left ventricular hypertrophy  Anterior Q waves, possibly due to LVH  Borderline T abnormalities, inferior leads  Artifact in lead(s) I,II,III,aVR,aVL,aVF,V1,V2  Compared to ECG 2024 11:25:41  Accelerated junctional rhythm now present  Left anterior fascicular block now present  Left ventricular hypertrophy now present  Q waves now present  T-wave abnormality now present  Sinus bradycardia no longer present  Myocardial infarct finding no longer present              I have independently interpreted this EKG    RADIOLOGY/PROCEDURES   The attending emergency physician has independently interpreted the diagnostic imaging associated with this visit and am waiting the final reading from the radiologist.   My preliminary interpretation is a follows: no SBO    Radiologist interpretation:  DX-CHEST-PORTABLE (1 VIEW)   Final Result         1.  Left basilar atelectasis, no focal infiltrate      CT-ABDOMEN-PELVIS WITH   Final Result         1.  Intrahepatic and extrahepatic biliary ductal dilatation, commonly associated with postcholecystectomy physiology, consider causes of biliary obstruction with additional workup as clinically appropriate   2.  Diverticulosis   3.  Atherosclerosis and atherosclerotic coronary artery disease          COURSE & MEDICAL DECISION MAKING     ASSESSMENT,  COURSE AND PLAN  Care Narrative:   Patient with ongoing vomiting and hypokalemia and COVID-19 infection  Patient was briefly hypoxic after morphine administration but no persistent hypoxia  CT scan with no small bowel obstruction  Plan for admission for hypokalemia and nausea medications  Will consider Decadron if patient returns to be hypoxic  EKG changes consistent with hypokalemia  Do not feel like this is safe to discharge home given low potassium    Medications   potassium chloride (KCL) ivpb 10 mEq (has no administration in time range)   potassium chloride SA (Kdur) tablet 40 mEq (has no administration in time range)   lactated ringers (LR) bolus (1,000 mL Intravenous New Bag 1/12/25 2100)   morphine 4 MG/ML injection 4 mg (4 mg Intravenous Given 1/12/25 2055)   ondansetron (Zofran) syringe/vial injection 4 mg (4 mg Intravenous Given 1/12/25 2055)   iohexol (OMNIPAQUE) 350 mg/mL (IV) (95 mL Intravenous Given 1/12/25 2200)   metoclopramide (Reglan) injection 10 mg (10 mg Intravenous Given 1/12/25 2212)        DISPOSITION AND DISCUSSIONS  I have discussed management of the patient with the following physicians and ANJUM's:  [place]      FINAL DIANGOSIS  1. Hypokalemia    2. COVID-19 virus infection    3. Nausea and vomiting, unspecified vomiting type       I, Kennedy Valencia (Liz), am scribing for, and in the presence of, Ten Mirza M.D..    Electronically signed by: Kennedy Valencia (Fadiaibpriyank), 1/12/2025    ITen M.D. personally performed the services described in this documentation, as scribed by Kennedy Valencia in my presence, and it is both accurate and complete.      The note accurately reflects work and decisions made by me.  Ten Mirza M.D.  1/12/2025  10:32 PM

## 2025-01-13 NOTE — ED NOTES
Pt resting on gurney, pt attached to monitor, respirations are equal and unlabored, call light in reach , bed locked and in lowest position, no needs at this time   Pt trailed on RA

## 2025-01-13 NOTE — H&P
"Hospital Medicine History & Physical Note    Date of Service  1/12/2025    Primary Care Physician  Maribel Salazar M.D.      Code Status  Full Code    Chief Complaint  Chief Complaint   Patient presents with    Flu Like Symptoms     Ongoing symptoms of N/V, stomach cramps, and diarrhea since 12/25/24.     Back Pain     Patient states history of back pain. Patient reports \"throwing her back out\" around maggie. Patient states unable to control pain and having difficulty sleeping.        History of Presenting Illness  Nieves Murphy is a 69 y.o. female who presented 1/12/2025 with nausea/vomiting.  Patient has no past medical history of bilateral DVTs, currently on Xarelto, who presents to the Emergency Department complaining of persistent nausea and vomiting.  Patient states that she did experience symptoms since December 25, 2024.  She states that the pain she states that she is unable to tolerate any p.o.  She does have some cramping abdominal pain as well.  Denies any diarrhea.  Denies any fevers or chills.  In the emergency department, CT abdomen pelvis was obtained which revealed intra and extrahepatic biliary ductal dilation, associated with post cholecystectomy physiology.  Bilirubin within normal limits.  She did have a low potassium level of 2.9.   she incidentally did test positive for COVID-19.  However she denies any upper respiratory like symptoms.  She is currently saturating appropriate on room air.  Chest x-ray obtained was unremarkable for any acute processes.  Patient will be admitted for management of her hypokalemia.    I discussed the plan of care with patient.    Review of Systems  Review of Systems   Constitutional:  Negative for chills and fever.   Eyes:  Negative for double vision, photophobia, pain and discharge.   Cardiovascular:  Negative for chest pain, palpitations and orthopnea.   Gastrointestinal:  Positive for abdominal pain, nausea and vomiting. Negative for diarrhea. "   Genitourinary:  Negative for frequency, hematuria and urgency.   Musculoskeletal:  Negative for back pain and neck pain.   Neurological:  Negative for dizziness and headaches.   All other systems reviewed and are negative.      Past Medical History   has a past medical history of Anxiety, Arthritis, Dental disorder (2016), DJD (degenerative joint disease), DVT (deep venous thrombosis) ([facility]) (2-2016), Gynecological disorder, Heart burn (2016), Indigestion, Obesity, and Restless leg syndrome.    Surgical History   has a past surgical history that includes other abdominal surgery; vaginal hysterectomy scope total (1/3/2017); anterior and posterior repair (1/3/2017); enterocele repair (1/3/2017); vaginal suspension (1/3/2017); bladder sling female (1/3/2017); cystoscopy (1/3/2017); anterior and posterior repair (7/18/2017); enterocele repair (7/18/2017); vaginal suspension (7/18/2017); and bladder sling female (7/18/2017).     Family History  family history is not on file.   Family history reviewed with patient. There is no family history that is pertinent to the chief complaint.     Social History   reports that she quit smoking about 51 years ago. Her smoking use included cigarettes. She does not have any smokeless tobacco history on file. She reports current drug use. Drug: Inhaled. She reports that she does not drink alcohol.    Allergies  Allergies   Allergen Reactions    Asa [Aspirin] Unspecified     GI DISCOMFORT    Pcn [Penicillins] Unspecified     GI DISCOMFORT       Medications  Prior to Admission Medications   Prescriptions Last Dose Informant Patient Reported? Taking?   Semaglutide,0.25 or 0.5MG/DOS, (OZEMPIC, 0.25 OR 0.5 MG/DOSE,) 2 MG/3ML Solution Pen-injector   No No   Sig: Inject 0.25 mg under the skin every 7 days.   acetaminophen (TYLENOL) 500 MG Tab   No No   Sig: Take 2 Tablets by mouth every 6 hours as needed for Moderate Pain.   albuterol 108 (90 Base) MCG/ACT Aero Soln inhalation aerosol   No No    Sig: Inhale 2 Puffs every 6 hours as needed for Shortness of Breath.   atorvastatin (LIPITOR) 10 MG Tab   No No   Sig: Take 1 Tablet by mouth every day.   gabapentin (NEURONTIN) 400 MG Cap   No No   Sig: Take 1 Capsule by mouth 3 times a day.   hydrocortisone (CORTEF) 5 MG Tab   No No   Sig: Take 1 Tablet by mouth 2 times a day.   levothyroxine (SYNTHROID) 125 MCG Tab  Patient's Home Pharmacy Yes No   Sig: Take 125 mcg by mouth every morning on an empty stomach.   pantoprazole (PROTONIX) 20 MG tablet   No No   Sig: Take 1 Tablet by mouth every day.   traZODone (DESYREL) 50 MG Tab   No No   Sig: Take 2 Tablets by mouth every evening. May take addition 2 tablets as needed    100 mg = 2 tabs      Facility-Administered Medications: None       Physical Exam  Temp:  [36.2 °C (97.2 °F)] 36.2 °C (97.2 °F)  Pulse:  [78-85] 79  Resp:  [13-16] 13  BP: (107-182)/() 107/54  SpO2:  [86 %-100 %] 94 %  Blood Pressure : 107/54   Temperature: 36.2 °C (97.2 °F)   Pulse: 79   Respiration: 13   Pulse Oximetry: 94 %       Physical Exam  Constitutional:       General: She is not in acute distress.     Appearance: Normal appearance. She is normal weight. She is not ill-appearing, toxic-appearing or diaphoretic.   HENT:      Head: Normocephalic and atraumatic.      Nose: Nose normal.      Mouth/Throat:      Mouth: Mucous membranes are moist.   Eyes:      Extraocular Movements: Extraocular movements intact.      Pupils: Pupils are equal, round, and reactive to light.   Cardiovascular:      Rate and Rhythm: Normal rate and regular rhythm.      Pulses: Normal pulses.      Heart sounds: Normal heart sounds. No murmur heard.     No friction rub. No gallop.   Pulmonary:      Effort: Pulmonary effort is normal. No respiratory distress.      Breath sounds: No stridor. No wheezing, rhonchi or rales.   Chest:      Chest wall: No tenderness.   Abdominal:      General: Abdomen is flat. There is no distension.      Palpations: Abdomen is soft.  "There is no mass.      Tenderness: There is no abdominal tenderness. There is no guarding or rebound.      Hernia: No hernia is present.   Musculoskeletal:         General: No swelling, tenderness, deformity or signs of injury.      Right lower leg: No edema.      Left lower leg: No edema.      Comments:      Skin:     General: Skin is warm and dry.      Capillary Refill: Capillary refill takes less than 2 seconds.      Coloration: Skin is not jaundiced or pale.      Findings: No bruising, erythema, lesion or rash.   Neurological:      General: No focal deficit present.      Mental Status: She is alert and oriented to person, place, and time. Mental status is at baseline.      Cranial Nerves: No cranial nerve deficit.      Sensory: No sensory deficit.      Motor: No weakness.      Coordination: Coordination normal.   Psychiatric:         Mood and Affect: Mood normal.         Behavior: Behavior normal.         Laboratory:  Recent Labs     01/12/25 2037   WBC 9.4   RBC 4.80   HEMOGLOBIN 12.2   HEMATOCRIT 38.2   MCV 79.6*   MCH 25.4*   MCHC 31.9*   RDW 55.6*   PLATELETCT 297   MPV 8.7*     Recent Labs     01/12/25 2037   SODIUM 135   POTASSIUM 2.9*   CHLORIDE 98   CO2 22   GLUCOSE 153*   BUN 10   CREATININE 0.54   CALCIUM 8.7     Recent Labs     01/12/25 2037   ALTSGPT 8   ASTSGOT 15   ALKPHOSPHAT 139*   TBILIRUBIN 0.7   LIPASE 18   GLUCOSE 153*         No results for input(s): \"NTPROBNP\" in the last 72 hours.      No results for input(s): \"TROPONINT\" in the last 72 hours.    Imaging:  DX-CHEST-PORTABLE (1 VIEW)   Final Result         1.  Left basilar atelectasis, no focal infiltrate      CT-ABDOMEN-PELVIS WITH   Final Result         1.  Intrahepatic and extrahepatic biliary ductal dilatation, commonly associated with postcholecystectomy physiology, consider causes of biliary obstruction with additional workup as clinically appropriate   2.  Diverticulosis   3.  Atherosclerosis and atherosclerotic coronary artery " disease          X-Ray:  I have personally reviewed the images and compared with prior images.  EKG:  I have personally reviewed the images and compared with prior images.    Assessment/Plan:  Justification for Admission Status  I anticipate this patient will require at least two midnights for appropriate medical management, necessitating inpatient admission because hypokalemia    Patient will need a Telemetry bed on MEDICAL service .  The need is secondary to hypokalemia.    * Hypokalemia- (present on admission)  Assessment & Plan  Replete with IV and PO potassium  Follow up daily BMPs    Nausea and vomiting  Assessment & Plan  CT imaging: Intrahepatic and extrahepatic biliary ductal dilatation, commonly associated with postcholecystectomy physiology   Continue with IV and PO antiemetics     COVID-19  Assessment & Plan  Patient tested positive for COVID-19 on the Emergency Department.  She is currently not requiring any supplemental oxygen.  Saturating 94% on room air.  No indication for IV Decadron  Denies any upper respiratory-like symptoms  Chest x-ray was unremarkable      Hypothyroidism- (present on admission)  Assessment & Plan  Continue synthroid     DVT (deep venous thrombosis) (CMS-HCC)- (present on admission)  Assessment & Plan  Resume home dose Xarelto         VTE prophylaxis: therapeutic anticoagulation with Xarelto

## 2025-01-13 NOTE — DISCHARGE SUMMARY
"Discharge Summary    CHIEF COMPLAINT ON ADMISSION  Chief Complaint   Patient presents with    Flu Like Symptoms     Ongoing symptoms of N/V, stomach cramps, and diarrhea since 12/25/24.     Back Pain     Patient states history of back pain. Patient reports \"throwing her back out\" around maggie. Patient states unable to control pain and having difficulty sleeping.        Reason for Admission  Hypokalemia     Admission Date  1/12/2025    CODE STATUS  Full Code    HPI & HOSPITAL COURSE  This is a 69 y.o. female with h/o DVTs on xarelto and hypothyroidism, here with N/V.    She presented with ~2 weeks of nausea, vomiting, and abdominal cramps without diarrhea. CT A/P demonstrated post-cholecystectomy changes without acute abnormalities. CMP notable for K 2.9 which was supplemented with Kdur. She was found to be positive for COVID19 but did not have hypoxia to warrant dexamethasone. Her N/V resolved with antiemetics.    Therefore, she is discharged in good and stable condition to home with close outpatient follow-up.    The patient recovered much more quickly than anticipated on admission.    Discharge Date  1/13/25    FOLLOW UP ITEMS POST DISCHARGE    COVID19 - supportive care, antiemtics    DISCHARGE DIAGNOSES  Principal Problem:    Hypokalemia (POA: Yes)  Active Problems:    History of DVT (deep vein thrombosis) (POA: Yes)    Acquired hypothyroidism (POA: Yes)    COVID-19 (POA: Yes)  Resolved Problems:    Nausea and vomiting (POA: Yes)      FOLLOW UP  No future appointments.  Maribel Salazar M.D.  [id number] [facility] [id number] [phone]    Call  As needed      MEDICATIONS ON DISCHARGE     Medication List        START taking these medications        Instructions   acetaminophen 500 MG Tabs  Commonly known as: Tylenol   Take 2 Tablets by mouth every 6 hours as needed for Mild Pain or Fever.  Dose: 1,000 mg     ondansetron 4 MG Tbdp  Commonly known as: Zofran ODT   Take 1 Tablet by mouth every " four hours as needed for Nausea/Vomiting (use FIRST).  Dose: 4 mg     potassium chloride SA 20 MEQ Tbcr  Commonly known as: Kdur   Take 1 Tablet by mouth 2 times a day.  Dose: 20 mEq     prochlorperazine 5 MG Tabs  Commonly known as: Compazine   Take 1 Tablet by mouth every 6 hours as needed for Nausea/Vomiting (use SECOND if zofran ineffective).  Dose: 5 mg            CONTINUE taking these medications        Instructions   albuterol 108 (90 Base) MCG/ACT Aers inhalation aerosol   Inhale 2 Puffs every 6 hours as needed for Shortness of Breath.  Dose: 2 Puff     atorvastatin 10 MG Tabs  Commonly known as: Lipitor   Take 1 Tablet by mouth every day.  Dose: 10 mg     HAIR SKIN & NAILS PO   Take 1 Tablet by mouth every day.  Dose: 1 Tablet     ibuprofen 200 MG Tabs  Commonly known as: Motrin   Take 600 mg by mouth every 6 hours as needed for Mild Pain.  Dose: 600 mg     levothyroxine 125 MCG Tabs  Commonly known as: Synthroid   Take 125 mcg by mouth every morning on an empty stomach.  Dose: 125 mcg     Ozempic (0.25 or 0.5 MG/DOSE) 2 MG/3ML Sopn  Generic drug: Semaglutide(0.25 or 0.5MG/DOS)   Inject 0.25 mg under the skin every 7 days.  Dose: 0.25 mg     pantoprazole 20 MG tablet  Commonly known as: Protonix   Take 1 Tablet by mouth every day.  Dose: 20 mg     traZODone 50 MG Tabs  Commonly known as: Desyrel   Take 2 Tablets by mouth every evening. May take addition 2 tablets as needed    100 mg = 2 tabs  Dose: 100 mg     XARELTO PO   Take 1 Tablet by mouth every day.  Dose: 1 Tablet              Allergies  Allergies   Allergen Reactions    Asa [Aspirin] Unspecified     GI DISCOMFORT    Pcn [Penicillins] Unspecified     GI DISCOMFORT       DIET  Orders Placed This Encounter   Procedures    Diet Order Diet: Regular     Standing Status:   Standing     Number of Occurrences:   1     Order Specific Question:   Diet:     Answer:   Regular [1]       ACTIVITY  As tolerated.  Weight bearing as  tolerated    CONSULTATIONS  None    PROCEDURES  None    LABORATORY  Lab Results   Component Value Date    SODIUM 135 01/13/2025    POTASSIUM 3.1 (L) 01/13/2025    CHLORIDE 101 01/13/2025    CO2 24 01/13/2025    GLUCOSE 108 (H) 01/13/2025    BUN 7 (L) 01/13/2025    CREATININE 0.56 01/13/2025        Lab Results   Component Value Date    WBC 8.9 01/13/2025    HEMOGLOBIN 10.6 (L) 01/13/2025    HEMATOCRIT 34.3 (L) 01/13/2025    PLATELETCT 282 01/13/2025        Total time of the discharge process exceeds 33 minutes.

## 2025-01-13 NOTE — ED NOTES
Pts caregiver here to give pt ride home , PIV removed and intact , d/c instructions discusses , pt d/c on RA 94%

## 2025-01-13 NOTE — ED NOTES
Pt medicated per mar, pt desatted to 86% on RA. 2L of O2 applied via nasal cannula. Lactic sent to lab. Pt placed on a purewick

## 2025-01-13 NOTE — DISCHARGE INSTRUCTIONS
Discharge Instructions per Hugo Carpio M.D.    You were seen in the [facility] ED for nausea/vomiting and not feeling well. You have been diagnosed with COVID19 but do not require oxygen, for which there is no treatment needed.    You are prescribed nausea medication for supportive care. You will return home to complete your recovery.    DIET: Regular    ACTIVITY: Regular    DIAGNOSIS: COVID19    Return to ER if you faint for any reason, develop sudden chest pain or shortness of breath, cough up blood, or become unable to keep down food/drink.

## 2025-01-13 NOTE — ED NOTES
Bedside report received from off going RN/tech: Ted, assumed care of patient.  POC discussed with patient. Call light within reach, all needs addressed at this time.       Fall risk interventions in place: Move the patient closer to the nurse's station, Patient's personal possessions are with in their safe reach, Place socks on patient, Place fall risk sign on patient's door, Give patient urinal if applicable, Keep floor surfaces clean and dry, and Accompanied to restroom (all applicable per [place] Fall risk assessment)   Continuous monitoring: Cardiac Leads, Pulse Ox, or Blood Pressure  IVF/IV medications: Not Applicable   Oxygen: How many liters 2L, Traced the line to wall oxygen, and No oxygen tank in room  Bedside sitter: Not Applicable   Isolation: Isolation precautions for COVID (Isolation order)

## 2025-01-13 NOTE — ASSESSMENT & PLAN NOTE
Patient tested positive for COVID-19 on the Emergency Department.  She is currently not requiring any supplemental oxygen.  Saturating 94% on room air.  No indication for IV Decadron  Denies any upper respiratory-like symptoms  Chest x-ray was unremarkable

## 2025-01-13 NOTE — ED NOTES
Med Rec completed  per patient     Allergies reviewed: yes     Antibiotics in the past 30 days: no    Anticoagulant in past 14 days:  yes     Anticoagulant:Xarelto  Last dose: 01/11/2024    Pharm[facility] utilizes: [facility]  [phone]    Per Patient she takes Xarelto but does not remember the strength. Last dispensed was a started pack back on 09/24/24. Patient states she picks it up from [facility], pharmacy is not open to verify

## 2025-01-14 PROBLEM — R11.2 NAUSEA AND VOMITING: Status: RESOLVED | Noted: 2025-01-12 | Resolved: 2025-01-14

## (undated) DEVICE — SYRINGE 10 ML CONTROL LL (25EA/BX 4BX/CA)

## (undated) DEVICE — BLADE SURGICAL #11 - (50/BX)

## (undated) DEVICE — BLADE SURGICAL #15 - (50/BX 3BX/CA)

## (undated) DEVICE — GLOVE BIOGEL PI INDICATOR SZ 7.0 SURGICAL PF LF - (50/BX 4BX/CA)

## (undated) DEVICE — TUBE E-T HI-LO CUFF 7.0MM (10EA/PK)

## (undated) DEVICE — CANISTER SUCTION 3000ML MECHANICAL FILTER AUTO SHUTOFF MEDI-VAC NONSTERILE LF DISP  (40EA/CA)

## (undated) DEVICE — SET EXTENSION WITH 2 PORTS (48EA/CA) ***PART #2C8610 IS A SUBSTITUTE*****

## (undated) DEVICE — DEVICE SUTURE CAPTURING

## (undated) DEVICE — TUBE CONNECTING SUCTION - CLEAR PLASTIC STERILE 72 IN (50EA/CA)

## (undated) DEVICE — SUTURE 2-0 VICRYL PLUS CT-1 36 (36PK/BX)"

## (undated) DEVICE — Device

## (undated) DEVICE — SUTURE 0 VICRYL PLUS CT-1 - 8 X 18 INCH (12/BX)

## (undated) DEVICE — CATHETER IV 20 GA X 1-1/4 ---SURG.& SDS ONLY--- (50EA/BX)

## (undated) DEVICE — DRAPE VAGINAL BIB W/ POUCH (10EA/CA)

## (undated) DEVICE — MANIFOLD NEPTUNE 1 PORT

## (undated) DEVICE — LACTATED RINGERS INJ 1000 ML - (14EA/CA 60CA/PF)

## (undated) DEVICE — TUBING CLEARLINK DUO-VENT - C-FLO (48EA/CA)

## (undated) DEVICE — CANISTER SUCTION RIGID RED 1500CC (40EA/CA)

## (undated) DEVICE — SUTURE CAPIO (12EA/BX)

## (undated) DEVICE — PAD SANITARY 11IN MAXI IND WRAPPED  (12EA/PK 24PK/CA)

## (undated) DEVICE — GLOVE BIOGEL SZ 8 SURGICAL PF LTX - (50PR/BX 4BX/CA)

## (undated) DEVICE — SODIUM CHL IRRIGATION 0.9% 1000ML (12EA/CA)

## (undated) DEVICE — PROTECTOR ULNA NERVE - (36PR/CA)

## (undated) DEVICE — HEAD HOLDER JUNIOR/ADULT

## (undated) DEVICE — SENSOR SPO2 NEO LNCS ADHESIVE (20/BX) SEE USER NOTES

## (undated) DEVICE — SET IRRIGATION CYSTOSCOPY TUBE L80 IN (20EA/CA)

## (undated) DEVICE — TRAY FOLEY CATHETER STATLOCK 16FR SURESTEP  (10EA/CA)

## (undated) DEVICE — LEAD SET 6 DISP. EKG NIHON KOHDEN

## (undated) DEVICE — TRAY SRGPRP PVP IOD WT PRP - (20/CA)

## (undated) DEVICE — ELECTRODE 850 FOAM ADHESIVE - HYDROGEL RADIOTRNSPRNT (50/PK)

## (undated) DEVICE — GLOVE BIOGEL SZ 7 SURGICAL PF LTX - (50PR/BX 4BX/CA)

## (undated) DEVICE — ELECTRODE DUAL RETURN W/ CORD - (50/PK)

## (undated) DEVICE — KIT  I.V. START (100EA/CA)

## (undated) DEVICE — SUTURE GENERAL

## (undated) DEVICE — MASK ANESTHESIA ADULT  - (100/CA)

## (undated) DEVICE — KIT ANESTHESIA W/CIRCUIT & 3/LT BAG W/FILTER (20EA/CA)

## (undated) DEVICE — MANIFOLD NEPTUNE 1 PORT (20/PK)

## (undated) DEVICE — SUCTION INSTRUMENT YANKAUER BULBOUS TIP W/O VENT (50EA/CA)

## (undated) DEVICE — SET LEADWIRE 5 LEAD BEDSIDE DISPOSABLE ECG (1SET OF 5/EA)